# Patient Record
Sex: MALE | Race: WHITE | NOT HISPANIC OR LATINO | Employment: OTHER | URBAN - METROPOLITAN AREA
[De-identification: names, ages, dates, MRNs, and addresses within clinical notes are randomized per-mention and may not be internally consistent; named-entity substitution may affect disease eponyms.]

---

## 2019-06-18 ENCOUNTER — TRANSCRIBE ORDERS (OUTPATIENT)
Dept: ADMINISTRATIVE | Facility: HOSPITAL | Age: 39
End: 2019-06-18

## 2019-06-29 ENCOUNTER — HOSPITAL ENCOUNTER (EMERGENCY)
Facility: HOSPITAL | Age: 39
Discharge: HOME | End: 2019-06-29
Attending: EMERGENCY MEDICINE | Admitting: EMERGENCY MEDICINE
Payer: COMMERCIAL

## 2019-06-29 ENCOUNTER — APPOINTMENT (EMERGENCY)
Dept: RADIOLOGY | Facility: HOSPITAL | Age: 39
End: 2019-06-29
Payer: COMMERCIAL

## 2019-06-29 VITALS
WEIGHT: 292.33 LBS | OXYGEN SATURATION: 93 % | HEART RATE: 61 BPM | TEMPERATURE: 98.4 F | RESPIRATION RATE: 17 BRPM | SYSTOLIC BLOOD PRESSURE: 160 MMHG | DIASTOLIC BLOOD PRESSURE: 72 MMHG

## 2019-06-29 DIAGNOSIS — R10.13 EPIGASTRIC PAIN: ICD-10-CM

## 2019-06-29 DIAGNOSIS — R11.2 NAUSEA AND VOMITING: ICD-10-CM

## 2019-06-29 DIAGNOSIS — R07.9 CHEST PAIN: Primary | ICD-10-CM

## 2019-06-29 LAB
ALBUMIN SERPL BCP-MCNC: 3.2 G/DL (ref 3.5–5)
ALP SERPL-CCNC: 85 U/L (ref 46–116)
ALT SERPL W P-5'-P-CCNC: 17 U/L (ref 12–78)
ANION GAP SERPL CALCULATED.3IONS-SCNC: 10 MMOL/L (ref 4–13)
AST SERPL W P-5'-P-CCNC: 19 U/L (ref 5–45)
BASOPHILS # BLD MANUAL: 0 THOUSAND/UL (ref 0–0.1)
BASOPHILS NFR MAR MANUAL: 0 % (ref 0–1)
BILIRUB SERPL-MCNC: 0.2 MG/DL (ref 0.2–1)
BUN SERPL-MCNC: 6 MG/DL (ref 5–25)
CALCIUM SERPL-MCNC: 8.6 MG/DL (ref 8.3–10.1)
CHLORIDE SERPL-SCNC: 101 MMOL/L (ref 100–108)
CO2 SERPL-SCNC: 26 MMOL/L (ref 21–32)
CREAT SERPL-MCNC: 0.84 MG/DL (ref 0.6–1.3)
EOSINOPHIL # BLD MANUAL: 0 THOUSAND/UL (ref 0–0.4)
EOSINOPHIL NFR BLD MANUAL: 0 % (ref 0–6)
ERYTHROCYTE [DISTWIDTH] IN BLOOD BY AUTOMATED COUNT: 15.4 % (ref 11.6–15.1)
GFR SERPL CREATININE-BSD FRML MDRD: 109 ML/MIN/1.73SQ M
GLUCOSE SERPL-MCNC: 177 MG/DL (ref 65–140)
HCT VFR BLD AUTO: 40.7 % (ref 36.5–49.3)
HGB BLD-MCNC: 12.7 G/DL (ref 12–17)
LG PLATELETS BLD QL SMEAR: PRESENT
LIPASE SERPL-CCNC: 389 U/L (ref 73–393)
LYMPHOCYTES # BLD AUTO: 0.61 THOUSAND/UL (ref 0.6–4.47)
LYMPHOCYTES # BLD AUTO: 4 % (ref 14–44)
MCH RBC QN AUTO: 26.4 PG (ref 26.8–34.3)
MCHC RBC AUTO-ENTMCNC: 31.2 G/DL (ref 31.4–37.4)
MCV RBC AUTO: 85 FL (ref 82–98)
MONOCYTES # BLD AUTO: 0.31 THOUSAND/UL (ref 0–1.22)
MONOCYTES NFR BLD: 2 % (ref 4–12)
NEUTROPHILS # BLD MANUAL: 14.36 THOUSAND/UL (ref 1.85–7.62)
NEUTS BAND NFR BLD MANUAL: 1 % (ref 0–8)
NEUTS SEG NFR BLD AUTO: 93 % (ref 43–75)
NRBC BLD AUTO-RTO: 0 /100 WBCS
PLATELET # BLD AUTO: 233 THOUSANDS/UL (ref 149–390)
PLATELET BLD QL SMEAR: ADEQUATE
PMV BLD AUTO: 10.4 FL (ref 8.9–12.7)
POTASSIUM SERPL-SCNC: 3.7 MMOL/L (ref 3.5–5.3)
PROT SERPL-MCNC: 7.3 G/DL (ref 6.4–8.2)
RBC # BLD AUTO: 4.81 MILLION/UL (ref 3.88–5.62)
SODIUM SERPL-SCNC: 137 MMOL/L (ref 136–145)
TOTAL CELLS COUNTED SPEC: 100
TROPONIN I SERPL-MCNC: <0.02 NG/ML
WBC # BLD AUTO: 15.28 THOUSAND/UL (ref 4.31–10.16)

## 2019-06-29 PROCEDURE — 83690 ASSAY OF LIPASE: CPT | Performed by: EMERGENCY MEDICINE

## 2019-06-29 PROCEDURE — 96372 THER/PROPH/DIAG INJ SC/IM: CPT

## 2019-06-29 PROCEDURE — 99283 EMERGENCY DEPT VISIT LOW MDM: CPT | Performed by: EMERGENCY MEDICINE

## 2019-06-29 PROCEDURE — 80053 COMPREHEN METABOLIC PANEL: CPT | Performed by: EMERGENCY MEDICINE

## 2019-06-29 PROCEDURE — 96374 THER/PROPH/DIAG INJ IV PUSH: CPT

## 2019-06-29 PROCEDURE — 84484 ASSAY OF TROPONIN QUANT: CPT | Performed by: EMERGENCY MEDICINE

## 2019-06-29 PROCEDURE — 96376 TX/PRO/DX INJ SAME DRUG ADON: CPT

## 2019-06-29 PROCEDURE — 96361 HYDRATE IV INFUSION ADD-ON: CPT

## 2019-06-29 PROCEDURE — 85007 BL SMEAR W/DIFF WBC COUNT: CPT | Performed by: EMERGENCY MEDICINE

## 2019-06-29 PROCEDURE — 85027 COMPLETE CBC AUTOMATED: CPT | Performed by: EMERGENCY MEDICINE

## 2019-06-29 PROCEDURE — 36415 COLL VENOUS BLD VENIPUNCTURE: CPT

## 2019-06-29 PROCEDURE — 93005 ELECTROCARDIOGRAM TRACING: CPT

## 2019-06-29 PROCEDURE — 71046 X-RAY EXAM CHEST 2 VIEWS: CPT

## 2019-06-29 PROCEDURE — 99285 EMERGENCY DEPT VISIT HI MDM: CPT

## 2019-06-29 RX ORDER — METOCLOPRAMIDE HYDROCHLORIDE 5 MG/ML
10 INJECTION INTRAMUSCULAR; INTRAVENOUS ONCE
Status: COMPLETED | OUTPATIENT
Start: 2019-06-29 | End: 2019-06-29

## 2019-06-29 RX ORDER — LIDOCAINE HYDROCHLORIDE 20 MG/ML
15 SOLUTION OROPHARYNGEAL ONCE
Status: COMPLETED | OUTPATIENT
Start: 2019-06-29 | End: 2019-06-29

## 2019-06-29 RX ORDER — HALOPERIDOL 5 MG/ML
5 INJECTION INTRAMUSCULAR ONCE
Status: COMPLETED | OUTPATIENT
Start: 2019-06-29 | End: 2019-06-29

## 2019-06-29 RX ORDER — MAGNESIUM HYDROXIDE/ALUMINUM HYDROXICE/SIMETHICONE 120; 1200; 1200 MG/30ML; MG/30ML; MG/30ML
30 SUSPENSION ORAL ONCE
Status: COMPLETED | OUTPATIENT
Start: 2019-06-29 | End: 2019-06-29

## 2019-06-29 RX ORDER — METOCLOPRAMIDE 10 MG/1
10 TABLET ORAL EVERY 6 HOURS PRN
Qty: 30 TABLET | Refills: 0 | Status: SHIPPED | OUTPATIENT
Start: 2019-06-29 | End: 2019-09-27 | Stop reason: HOSPADM

## 2019-06-29 RX ADMIN — HALOPERIDOL LACTATE 5 MG: 5 INJECTION INTRAMUSCULAR at 03:09

## 2019-06-29 RX ADMIN — SODIUM CHLORIDE 1000 ML: 0.9 INJECTION, SOLUTION INTRAVENOUS at 01:27

## 2019-06-29 RX ADMIN — LIDOCAINE HYDROCHLORIDE 15 ML: 20 SOLUTION ORAL; TOPICAL at 01:31

## 2019-06-29 RX ADMIN — METOCLOPRAMIDE 10 MG: 5 INJECTION, SOLUTION INTRAMUSCULAR; INTRAVENOUS at 02:38

## 2019-06-29 RX ADMIN — ALUMINUM HYDROXIDE, MAGNESIUM HYDROXIDE, AND SIMETHICONE 30 ML: 200; 200; 20 SUSPENSION ORAL at 01:31

## 2019-06-29 RX ADMIN — METOCLOPRAMIDE 10 MG: 5 INJECTION, SOLUTION INTRAMUSCULAR; INTRAVENOUS at 01:27

## 2019-06-30 LAB
ATRIAL RATE: 57 BPM
P AXIS: 25 DEGREES
PR INTERVAL: 164 MS
QRS AXIS: 55 DEGREES
QRSD INTERVAL: 88 MS
QT INTERVAL: 474 MS
QTC INTERVAL: 461 MS
T WAVE AXIS: 38 DEGREES
VENTRICULAR RATE: 57 BPM

## 2019-06-30 PROCEDURE — 93010 ELECTROCARDIOGRAM REPORT: CPT | Performed by: INTERNAL MEDICINE

## 2019-07-03 NOTE — ED PROVIDER NOTES
History  Chief Complaint   Patient presents with    Chest Pain     pt vomiting with tight chest pain on the left      HPI  68-year-old male with history of diabetes and hypertension presents to the emergency department with complaints of vomiting and chest pain  Patient states that he has had abdominal issues for the past few months with epigastric pain as well as frequent nausea and vomiting, for which he has been taking Zantac and Prilosec  He has undergone outpatient workup including recent CT abdomen pelvis and is scheduled for an abdominal MRI and endoscopy in the upcoming weeks  He states that tonight he began again with epigastric pain, nausea, and vomiting  After vomiting multiple times, patient began to have central and left-sided chest tightness  He denies having had similar pain in the past and admits that he was retching very hard before pain began  He states that abdominal pain, nausea, and vomiting are unchanged from what he experiences frequently and he would not like to undergo further workup with CTAP  On ROS he denies recent fevers, chills, shortness of breath, lower abdominal pain, change in urinary/bowel function, or complaints other than stated above  Patient denies marijuana use  Patient continued to vomit after receiving Reglan, has allergy to Zofran  Given IM Haldol, after which nausea, vomiting, and dry heaves resolved  Patient reassessed and states he feels much better, requests prescription for p o  Reglan to take at home  He will follow up outpatient, return to the emergency department for new or worsening symptoms  None       Past Medical History:   Diagnosis Date    Cardiac disease     Hypertension        Past Surgical History:   Procedure Laterality Date    BACK SURGERY      HERNIA REPAIR      KNEE SURGERY       History reviewed  No pertinent family history  I have reviewed and agree with the history as documented      Social History     Tobacco Use    Smoking status: Current Every Day Smoker     Packs/day: 0 50   Substance Use Topics    Alcohol use: Not Currently    Drug use: Never        Review of Systems   Constitutional: Negative for chills and fever  Respiratory: Negative for shortness of breath  Cardiovascular: Positive for chest pain  Gastrointestinal: Positive for abdominal pain, nausea and vomiting  Musculoskeletal: Negative for back pain  Skin: Negative for rash and wound  Allergic/Immunologic: Negative for immunocompromised state  Neurological: Negative for headaches  Psychiatric/Behavioral: The patient is not nervous/anxious  All other systems reviewed and are negative  Physical Exam  Physical Exam   Constitutional: He is oriented to person, place, and time  He appears well-nourished  No distress  HENT:   Head: Normocephalic and atraumatic  Eyes: EOM are normal    Neck: Normal range of motion  Neck supple  Cardiovascular: Normal rate and regular rhythm  Pulmonary/Chest: Effort normal and breath sounds normal  No respiratory distress  Abdominal: Soft  He exhibits no distension  There is tenderness in the epigastric area  Musculoskeletal: Normal range of motion  Neurological: He is alert and oriented to person, place, and time  Skin: Skin is warm and dry  He is not diaphoretic  Psychiatric: He has a normal mood and affect  His behavior is normal    Nursing note and vitals reviewed        Vital Signs  ED Triage Vitals   Temperature Pulse Respirations Blood Pressure SpO2   06/29/19 0019 06/29/19 0018 06/29/19 0018 06/29/19 0018 06/29/19 0018   98 4 °F (36 9 °C) 61 18 (!) 179/89 98 %      Temp Source Heart Rate Source Patient Position - Orthostatic VS BP Location FiO2 (%)   06/29/19 0019 -- -- -- --   Oral          Pain Score       --                  Vitals:    06/29/19 0100 06/29/19 0130 06/29/19 0230 06/29/19 0400   BP: 151/86 147/67 141/74 160/72   Pulse: 58 55 (!) 54 61         Visual Acuity      ED Medications  Medications   Lidocaine Viscous HCl (XYLOCAINE) 2 % mucosal solution 15 mL (15 mL Swish & Swallow Given 6/29/19 0131)   aluminum-magnesium hydroxide-simethicone (MYLANTA) 200-200-20 mg/5 mL oral suspension 30 mL (30 mL Oral Given 6/29/19 0131)   metoclopramide (REGLAN) injection 10 mg (10 mg Intravenous Given 6/29/19 0127)   sodium chloride 0 9 % bolus 1,000 mL (0 mL Intravenous Stopped 6/29/19 0226)   metoclopramide (REGLAN) injection 10 mg (10 mg Intravenous Given 6/29/19 0238)   haloperidol lactate (HALDOL) injection 5 mg (5 mg Intramuscular Given 6/29/19 0309)       Diagnostic Studies  Results Reviewed     Procedure Component Value Units Date/Time    Lipase [[id number]]  (Normal) Collected:  06/29/19 0040    Lab Status:  Final result Specimen:  Blood from Arm, Right Updated:  06/29/19 0154     Lipase 389 u/L     CBC and differential [[id number]]  (Abnormal) Collected:  06/29/19 0040    Lab Status:  Final result Specimen:  Blood from Arm, Right Updated:  06/29/19 0138     WBC 15 28 Thousand/uL      RBC 4 81 Million/uL      Hemoglobin 12 7 g/dL      Hematocrit 40 7 %      MCV 85 fL      MCH 26 4 pg      MCHC 31 2 g/dL      RDW 15 4 %      MPV 10 4 fL      Platelets 248 Thousands/uL      nRBC 0 /100 WBCs     Troponin I [[id number]]  (Normal) Collected:  06/29/19 0040    Lab Status:  Final result Specimen:  Blood from Arm, Right Updated:  06/29/19 0108     Troponin I <0 02 ng/mL     Comprehensive metabolic panel [[id number]]  (Abnormal) Collected:  06/29/19 0040    Lab Status:  Final result Specimen:  Blood from Arm, Right Updated:  06/29/19 0103     Sodium 137 mmol/L      Potassium 3 7 mmol/L      Chloride 101 mmol/L      CO2 26 mmol/L      ANION GAP 10 mmol/L      BUN 6 mg/dL      Creatinine 0 84 mg/dL      Glucose 177 mg/dL      Calcium 8 6 mg/dL      AST 19 U/L      ALT 17 U/L      Alkaline Phosphatase 85 U/L      Total Protein 7 3 g/dL      Albumin 3 2 g/dL      Total Bilirubin 0 20 mg/dL      eGFR 109 ml/min/1 73sq m     Narrative:       National Kidney Disease Foundation guidelines for Chronic Kidney Disease (CKD):     Stage 1 with normal or high GFR (GFR > 90 mL/min/1 73 square meters)    Stage 2 Mild CKD (GFR = 60-89 mL/min/1 73 square meters)    Stage 3A Moderate CKD (GFR = 45-59 mL/min/1 73 square meters)    Stage 3B Moderate CKD (GFR = 30-44 mL/min/1 73 square meters)    Stage 4 Severe CKD (GFR = 15-29 mL/min/1 73 square meters)    Stage 5 End Stage CKD (GFR <15 mL/min/1 73 square meters)  Note: GFR calculation is accurate only with a steady state creatinine                 XR chest 2 views   Final Result by Mk Cameron MD (06/29 1011)      No acute cardiopulmonary disease  Workstation performed: [id number]                    Procedures  Procedures  EKG: Sinus keny @ 57 BPM, sinus arrhythmia, normal axis, no acute ST/T wave abnormality, no prior for comparison    ED Course           MDM    Disposition  Final diagnoses:   Chest pain   Epigastric pain   Nausea and vomiting     Time reflects when diagnosis was documented in both MDM as applicable and the Disposition within this note     Time User Action Codes Description Comment    6/29/2019  4:17 AM Theresa Noriega Add [R07 9] Chest pain     6/29/2019  4:17 AM Theresa Flowers [R10 13] Epigastric pain     6/29/2019  4:17 AM Theresa Flowers [R11 2] Nausea and vomiting       ED Disposition     ED Disposition Condition Date/Time Comment    Discharge Stable Sat Jun 29, 2019  4:17 AM Jacob Augustine discharge to home/self care              Follow-up Information     Follow up With Specialties Details Why Contact Info Additional [address], DO Family Medicine  As needed [address] [facility] [address] [place] [address] 65 Emergency Department Emergency Medicine  If symptoms worsen 34 [facility] [address] [phone] [place] ED, [address], [place], [address]          Discharge Medication List as of 6/29/2019  4:18 AM      START taking these medications    Details   metoclopramide (REGLAN) 10 mg tablet Take 1 tablet (10 mg total) by mouth every 6 (six) hours as needed (nausea/vomiting), Starting Sat 6/29/2019, Print           No discharge procedures on file      ED Provider  Electronically Signed by           Isauro Mosquera MD  07/03/19 6137

## 2019-09-04 ENCOUNTER — APPOINTMENT (EMERGENCY)
Dept: CT IMAGING | Facility: HOSPITAL | Age: 39
DRG: 663 | End: 2019-09-04
Payer: COMMERCIAL

## 2019-09-04 ENCOUNTER — HOSPITAL ENCOUNTER (INPATIENT)
Facility: HOSPITAL | Age: 39
LOS: 4 days | Discharge: HOME | DRG: 663 | End: 2019-09-08
Attending: EMERGENCY MEDICINE | Admitting: INTERNAL MEDICINE
Payer: COMMERCIAL

## 2019-09-04 DIAGNOSIS — R11.10 INTRACTABLE VOMITING: Primary | ICD-10-CM

## 2019-09-04 DIAGNOSIS — R10.13 EPIGASTRIC PAIN: ICD-10-CM

## 2019-09-04 DIAGNOSIS — K85.90 ACUTE PANCREATITIS: ICD-10-CM

## 2019-09-04 DIAGNOSIS — E66.01 MORBID OBESITY WITH BMI OF 40.0-44.9, ADULT (HCC): ICD-10-CM

## 2019-09-04 DIAGNOSIS — K86.89 PANCREATIC MASS: ICD-10-CM

## 2019-09-04 DIAGNOSIS — K29.70 GASTRITIS: ICD-10-CM

## 2019-09-04 PROBLEM — F11.20 OPIATE DEPENDENCE, CONTINUOUS (HCC): Status: ACTIVE | Noted: 2019-09-04

## 2019-09-04 PROBLEM — K76.0 HEPATIC STEATOSIS: Status: ACTIVE | Noted: 2019-09-04

## 2019-09-04 PROBLEM — Z72.0 TOBACCO USE: Status: ACTIVE | Noted: 2019-09-04

## 2019-09-04 PROBLEM — R16.0 HEPATOMEGALY: Status: ACTIVE | Noted: 2019-09-04

## 2019-09-04 PROBLEM — Q89.09 SPLEEN ANOMALY: Status: ACTIVE | Noted: 2019-09-04

## 2019-09-04 LAB
ALBUMIN SERPL BCP-MCNC: 3.1 G/DL (ref 3.5–5)
ALP SERPL-CCNC: 79 U/L (ref 46–116)
ALT SERPL W P-5'-P-CCNC: 22 U/L (ref 12–78)
ANION GAP SERPL CALCULATED.3IONS-SCNC: 12 MMOL/L (ref 4–13)
APTT PPP: 29 SECONDS (ref 23–37)
AST SERPL W P-5'-P-CCNC: 23 U/L (ref 5–45)
ATRIAL RATE: 61 BPM
BASOPHILS # BLD AUTO: 0.01 THOUSANDS/ΜL (ref 0–0.1)
BASOPHILS NFR BLD AUTO: 0 % (ref 0–1)
BILIRUB SERPL-MCNC: 0.5 MG/DL (ref 0.2–1)
BUN SERPL-MCNC: 9 MG/DL (ref 5–25)
CALCIUM SERPL-MCNC: 8.9 MG/DL (ref 8.3–10.1)
CHLORIDE SERPL-SCNC: 101 MMOL/L (ref 100–108)
CO2 SERPL-SCNC: 27 MMOL/L (ref 21–32)
CREAT SERPL-MCNC: 0.64 MG/DL (ref 0.6–1.3)
EOSINOPHIL # BLD AUTO: 0.04 THOUSAND/ΜL (ref 0–0.61)
EOSINOPHIL NFR BLD AUTO: 0 % (ref 0–6)
ERYTHROCYTE [DISTWIDTH] IN BLOOD BY AUTOMATED COUNT: 15.6 % (ref 11.6–15.1)
GFR SERPL CREATININE-BSD FRML MDRD: 122 ML/MIN/1.73SQ M
GLUCOSE SERPL-MCNC: 119 MG/DL (ref 65–140)
HCT VFR BLD AUTO: 42.6 % (ref 36.5–49.3)
HGB BLD-MCNC: 13.2 G/DL (ref 12–17)
IMM GRANULOCYTES # BLD AUTO: 0.04 THOUSAND/UL (ref 0–0.2)
IMM GRANULOCYTES NFR BLD AUTO: 0 % (ref 0–2)
INR PPP: 1 (ref 0.84–1.19)
LACTATE SERPL-SCNC: 0.9 MMOL/L (ref 0.5–2)
LIPASE SERPL-CCNC: 412 U/L (ref 73–393)
LYMPHOCYTES # BLD AUTO: 1.12 THOUSANDS/ΜL (ref 0.6–4.47)
LYMPHOCYTES NFR BLD AUTO: 11 % (ref 14–44)
MCH RBC QN AUTO: 26.9 PG (ref 26.8–34.3)
MCHC RBC AUTO-ENTMCNC: 31 G/DL (ref 31.4–37.4)
MCV RBC AUTO: 87 FL (ref 82–98)
MONOCYTES # BLD AUTO: 0.33 THOUSAND/ΜL (ref 0.17–1.22)
MONOCYTES NFR BLD AUTO: 3 % (ref 4–12)
NEUTROPHILS # BLD AUTO: 8.5 THOUSANDS/ΜL (ref 1.85–7.62)
NEUTS SEG NFR BLD AUTO: 86 % (ref 43–75)
NRBC BLD AUTO-RTO: 0 /100 WBCS
P AXIS: -6 DEGREES
PLATELET # BLD AUTO: 215 THOUSANDS/UL (ref 149–390)
PMV BLD AUTO: 10.8 FL (ref 8.9–12.7)
POTASSIUM SERPL-SCNC: 4.2 MMOL/L (ref 3.5–5.3)
PR INTERVAL: 186 MS
PROT SERPL-MCNC: 7.5 G/DL (ref 6.4–8.2)
PROTHROMBIN TIME: 13.2 SECONDS (ref 11.6–14.5)
QRS AXIS: 47 DEGREES
QRSD INTERVAL: 90 MS
QT INTERVAL: 484 MS
QTC INTERVAL: 487 MS
RBC # BLD AUTO: 4.91 MILLION/UL (ref 3.88–5.62)
SODIUM SERPL-SCNC: 140 MMOL/L (ref 136–145)
T WAVE AXIS: 28 DEGREES
TROPONIN I SERPL-MCNC: <0.02 NG/ML
VENTRICULAR RATE: 61 BPM
WBC # BLD AUTO: 10.04 THOUSAND/UL (ref 4.31–10.16)

## 2019-09-04 PROCEDURE — 93005 ELECTROCARDIOGRAM TRACING: CPT

## 2019-09-04 PROCEDURE — 83690 ASSAY OF LIPASE: CPT | Performed by: PHYSICIAN ASSISTANT

## 2019-09-04 PROCEDURE — 96372 THER/PROPH/DIAG INJ SC/IM: CPT

## 2019-09-04 PROCEDURE — 99254 IP/OBS CNSLTJ NEW/EST MOD 60: CPT | Performed by: INTERNAL MEDICINE

## 2019-09-04 PROCEDURE — 84484 ASSAY OF TROPONIN QUANT: CPT | Performed by: PHYSICIAN ASSISTANT

## 2019-09-04 PROCEDURE — 99223 1ST HOSP IP/OBS HIGH 75: CPT | Performed by: INTERNAL MEDICINE

## 2019-09-04 PROCEDURE — 99285 EMERGENCY DEPT VISIT HI MDM: CPT

## 2019-09-04 PROCEDURE — 93010 ELECTROCARDIOGRAM REPORT: CPT | Performed by: INTERNAL MEDICINE

## 2019-09-04 PROCEDURE — 74176 CT ABD & PELVIS W/O CONTRAST: CPT

## 2019-09-04 PROCEDURE — 85025 COMPLETE CBC W/AUTO DIFF WBC: CPT | Performed by: PHYSICIAN ASSISTANT

## 2019-09-04 PROCEDURE — 99285 EMERGENCY DEPT VISIT HI MDM: CPT | Performed by: PHYSICIAN ASSISTANT

## 2019-09-04 PROCEDURE — 80053 COMPREHEN METABOLIC PANEL: CPT | Performed by: PHYSICIAN ASSISTANT

## 2019-09-04 PROCEDURE — 83605 ASSAY OF LACTIC ACID: CPT | Performed by: PHYSICIAN ASSISTANT

## 2019-09-04 PROCEDURE — 36415 COLL VENOUS BLD VENIPUNCTURE: CPT | Performed by: PHYSICIAN ASSISTANT

## 2019-09-04 PROCEDURE — 85610 PROTHROMBIN TIME: CPT | Performed by: PHYSICIAN ASSISTANT

## 2019-09-04 PROCEDURE — 85730 THROMBOPLASTIN TIME PARTIAL: CPT | Performed by: PHYSICIAN ASSISTANT

## 2019-09-04 PROCEDURE — C9113 INJ PANTOPRAZOLE SODIUM, VIA: HCPCS | Performed by: INTERNAL MEDICINE

## 2019-09-04 RX ORDER — HYDROMORPHONE HCL/PF 1 MG/ML
0.5 SYRINGE (ML) INJECTION
Status: DISCONTINUED | OUTPATIENT
Start: 2019-09-04 | End: 2019-09-05

## 2019-09-04 RX ORDER — ACETAMINOPHEN 325 MG/1
650 TABLET ORAL EVERY 6 HOURS PRN
Status: DISCONTINUED | OUTPATIENT
Start: 2019-09-04 | End: 2019-09-05

## 2019-09-04 RX ORDER — PREGABALIN 75 MG/1
100 CAPSULE ORAL 2 TIMES DAILY
COMMUNITY
End: 2019-10-08 | Stop reason: CLARIF

## 2019-09-04 RX ORDER — HALOPERIDOL 5 MG/ML
5 INJECTION INTRAMUSCULAR ONCE
Status: COMPLETED | OUTPATIENT
Start: 2019-09-04 | End: 2019-09-04

## 2019-09-04 RX ORDER — PANTOPRAZOLE SODIUM 40 MG/1
40 INJECTION, POWDER, FOR SOLUTION INTRAVENOUS
Status: DISCONTINUED | OUTPATIENT
Start: 2019-09-04 | End: 2019-09-05

## 2019-09-04 RX ORDER — METHADONE HYDROCHLORIDE 10 MG/ML
100 CONCENTRATE ORAL
COMMUNITY
End: 2019-11-11 | Stop reason: SDUPTHER

## 2019-09-04 RX ORDER — OMEPRAZOLE 20 MG/1
20 CAPSULE, DELAYED RELEASE ORAL DAILY
COMMUNITY
End: 2019-10-08 | Stop reason: CLARIF

## 2019-09-04 RX ORDER — PROMETHAZINE HYDROCHLORIDE 25 MG/ML
25 INJECTION, SOLUTION INTRAMUSCULAR; INTRAVENOUS EVERY 4 HOURS PRN
Status: DISCONTINUED | OUTPATIENT
Start: 2019-09-04 | End: 2019-09-06

## 2019-09-04 RX ORDER — ATENOLOL 25 MG/1
25 TABLET ORAL DAILY
Status: DISCONTINUED | OUTPATIENT
Start: 2019-09-04 | End: 2019-09-06

## 2019-09-04 RX ORDER — ATENOLOL 50 MG/1
100 TABLET ORAL DAILY
COMMUNITY
End: 2019-11-11 | Stop reason: SDUPTHER

## 2019-09-04 RX ORDER — DIPHENHYDRAMINE HYDROCHLORIDE 50 MG/ML
25 INJECTION INTRAMUSCULAR; INTRAVENOUS ONCE
Status: COMPLETED | OUTPATIENT
Start: 2019-09-04 | End: 2019-09-04

## 2019-09-04 RX ORDER — LISINOPRIL 20 MG/1
20 TABLET ORAL DAILY
COMMUNITY
End: 2019-09-27 | Stop reason: HOSPADM

## 2019-09-04 RX ORDER — METOCLOPRAMIDE HYDROCHLORIDE 5 MG/ML
10 INJECTION INTRAMUSCULAR; INTRAVENOUS ONCE
Status: DISCONTINUED | OUTPATIENT
Start: 2019-09-04 | End: 2019-09-04

## 2019-09-04 RX ORDER — SODIUM CHLORIDE 9 MG/ML
100 INJECTION, SOLUTION INTRAVENOUS CONTINUOUS
Status: DISCONTINUED | OUTPATIENT
Start: 2019-09-04 | End: 2019-09-08

## 2019-09-04 RX ORDER — DIPHENHYDRAMINE HYDROCHLORIDE 50 MG/ML
25 INJECTION INTRAMUSCULAR; INTRAVENOUS ONCE
Status: DISCONTINUED | OUTPATIENT
Start: 2019-09-04 | End: 2019-09-04

## 2019-09-04 RX ORDER — LISINOPRIL 20 MG/1
20 TABLET ORAL DAILY
Status: DISCONTINUED | OUTPATIENT
Start: 2019-09-04 | End: 2019-09-08 | Stop reason: HOSPADM

## 2019-09-04 RX ORDER — HYDROMORPHONE HCL/PF 1 MG/ML
1 SYRINGE (ML) INJECTION
Status: DISCONTINUED | OUTPATIENT
Start: 2019-09-04 | End: 2019-09-05

## 2019-09-04 RX ORDER — PREGABALIN 75 MG/1
75 CAPSULE ORAL 2 TIMES DAILY
Status: DISCONTINUED | OUTPATIENT
Start: 2019-09-04 | End: 2019-09-08 | Stop reason: HOSPADM

## 2019-09-04 RX ORDER — METOCLOPRAMIDE HYDROCHLORIDE 5 MG/ML
10 INJECTION INTRAMUSCULAR; INTRAVENOUS ONCE
Status: COMPLETED | OUTPATIENT
Start: 2019-09-04 | End: 2019-09-04

## 2019-09-04 RX ORDER — SODIUM CHLORIDE 9 MG/ML
125 INJECTION, SOLUTION INTRAVENOUS CONTINUOUS
Status: DISCONTINUED | OUTPATIENT
Start: 2019-09-04 | End: 2019-09-04

## 2019-09-04 RX ADMIN — METOCLOPRAMIDE 10 MG: 5 INJECTION, SOLUTION INTRAMUSCULAR; INTRAVENOUS at 10:09

## 2019-09-04 RX ADMIN — PROMETHAZINE HYDROCHLORIDE 25 MG: 25 INJECTION INTRAMUSCULAR; INTRAVENOUS at 21:54

## 2019-09-04 RX ADMIN — PREGABALIN 75 MG: 75 CAPSULE ORAL at 17:37

## 2019-09-04 RX ADMIN — DIPHENHYDRAMINE HYDROCHLORIDE 25 MG: 50 INJECTION, SOLUTION INTRAMUSCULAR; INTRAVENOUS at 10:12

## 2019-09-04 RX ADMIN — SODIUM CHLORIDE 125 ML/HR: 0.9 INJECTION, SOLUTION INTRAVENOUS at 23:39

## 2019-09-04 RX ADMIN — METOCLOPRAMIDE 10 MG: 5 INJECTION, SOLUTION INTRAMUSCULAR; INTRAVENOUS at 08:49

## 2019-09-04 RX ADMIN — PANTOPRAZOLE SODIUM 40 MG: 40 INJECTION, POWDER, FOR SOLUTION INTRAVENOUS at 15:34

## 2019-09-04 RX ADMIN — HYDROMORPHONE HYDROCHLORIDE 1 MG: 1 INJECTION, SOLUTION INTRAMUSCULAR; INTRAVENOUS; SUBCUTANEOUS at 21:53

## 2019-09-04 RX ADMIN — HALOPERIDOL LACTATE 5 MG: 5 INJECTION INTRAMUSCULAR at 08:45

## 2019-09-04 RX ADMIN — SODIUM CHLORIDE 125 ML/HR: 0.9 INJECTION, SOLUTION INTRAVENOUS at 15:26

## 2019-09-04 RX ADMIN — SODIUM CHLORIDE 1000 ML: 0.9 INJECTION, SOLUTION INTRAVENOUS at 12:14

## 2019-09-04 RX ADMIN — PROMETHAZINE HYDROCHLORIDE 25 MG: 25 INJECTION INTRAMUSCULAR; INTRAVENOUS at 15:36

## 2019-09-04 NOTE — ASSESSMENT & PLAN NOTE
· Lifestyle modifications are recommended including weight loss, improving his diet, and increasing his amount of activity    · Check an outpatient sleep study to evaluate the patient for obstructive sleep apnea

## 2019-09-04 NOTE — ASSESSMENT & PLAN NOTE
· Possible pancreatic mass  · Check an MRCP/MRI of the pancreas  · Check a CA 19-9 level  · Consult Gastroenterology

## 2019-09-04 NOTE — ASSESSMENT & PLAN NOTE
· On chronic methadone treatment for chronic neck/back pain  · Hold methadone during the hospitalization while utilizing PRN IV dilaudid

## 2019-09-04 NOTE — CONSULTS
Consultation - [facility]  Rosalinddevon Villeda 37 y o  male MRN: [id number]  Unit/Bed#: -01 Encounter: [id number]        Inpatient consult to gastroenterology  Consult performed by: Sangeetha Vargas PA-C  Consult ordered by: Sheryle Marek, DO          Reason for Consult / Principal Problem: Vomiting, Pain    HPI: Mr Vi Morales is a 38 yo M with a PMH of GERD, chronic methadone use, HTN, CAD, who presented due to persistent vomiting with epigastric pain  He reports he has been dealing with bilious vomiting for the past 6-7 months and his epigastric pain occurs after having significant vomiting  The abdominal pain does not precede the vomiting  He has never had pancreatitis before  He denies alcohol abuse  He recently had a workup done with [facility] with an EGD on 7/9 which showed a medium amount of food residue in the gastric body, localized severe mucosal congestion in the antrum, diffuse mild gastritis, and mucosal changes in the duodenum  Biopsies were taken but the results are not available  He also had a US which showed hepatic steatosis, mildly dilated CBD at 7 mm without obvious cholelithiasis or choledocholithiasis  He denies marijuana use  REVIEW OF SYSTEMS: Negative except for as stated above      Historical Information   Past Medical History:   Diagnosis Date    Cardiac disease     Hypertension      Past Surgical History:   Procedure Laterality Date    BACK SURGERY      HERNIA REPAIR      KNEE SURGERY       Social History   Social History     Substance and Sexual Activity   Alcohol Use Not Currently     Social History     Substance and Sexual Activity   Drug Use Never     Social History     Tobacco Use   Smoking Status Current Every Day Smoker    Packs/day: 0 50   Smokeless Tobacco Never Used     History reviewed  No pertinent family history      Meds/Allergies     Medications Prior to Admission   Medication    atenolol (TENORMIN) 50 mg tablet    lisinopril (ZESTRIL) 20 mg tablet    methadone (DOLOPHINE) 10 mg/mL oral concentrated solution    omeprazole (PriLOSEC) 20 mg delayed release capsule    pregabalin (LYRICA) 75 mg capsule    metoclopramide (REGLAN) 10 mg tablet     Current Facility-Administered Medications   Medication Dose Route Frequency    acetaminophen (TYLENOL) tablet 650 mg  650 mg Oral Q6H PRN    enoxaparin (LOVENOX) subcutaneous injection 40 mg  40 mg Subcutaneous Q12H [facility]    HYDROmorphone (DILAUDID) injection 0 5 mg  0 5 mg Intravenous Q3H PRN    Or    HYDROmorphone (DILAUDID) injection 1 mg  1 mg Intravenous Q3H PRN    nicotine (NICODERM CQ) 7 mg/24hr TD 24 hr patch 1 patch  1 patch Transdermal Daily    pantoprazole (PROTONIX) injection 40 mg  40 mg Intravenous Q24H [facility]    promethazine (PHENERGAN) injection 25 mg  25 mg Intravenous Q4H PRN    sodium chloride 0 9 % infusion  125 mL/hr Intravenous Continuous    sodium chloride 0 9 % infusion  125 mL/hr Intravenous Continuous       Allergies   Allergen Reactions    Iodinated Diagnostic Agents     Ketorolac Tromethamine      Other reaction(s): Other (Please comment)  Pt goes into shock if given    Ondansetron      Other reaction(s): Neuro complications (Please comment)  "Hands curl up  Face goes numb " per patient   A friend observed that he gets "violently ill "    3 May 2013: Zofran was given intraoperatively   No adverse, or even minor reaction has been observed after about 3 hours   Penicillins Hives     Other reaction(s): Other (Please comment)  Trouble breathing    Shellfish Allergy     Varenicline Hives           Objective     Blood pressure 144/78, pulse 67, temperature 98 4 °F (36 9 °C), temperature source Oral, resp  rate 17, height 5' 10" (1 778 m), weight 128 kg (282 lb 9 6 oz), SpO2 96 %      No intake or output data in the 24 hours ending 09/04/19 1455      PHYSICAL EXAM:      General Appearance:   Alert, cooperative, no distress, appears stated age    HEENT:   Normocephalic, atraumatic, anicteric      Neck:  Supple, symmetrical, trachea midline   Lungs:   Clear to auscultation bilaterally; no rales, rhonchi or wheezing; respirations unlabored    Heart[de-identified]   RRR, no murmur   Abdomen:   Soft, non-tender, non-distended; normal bowel sounds; no masses, no organomegaly    Rectal:   Deferred    Extremities:  No cyanosis, clubbing or edema    Pulses:  2+ and symmetric all extremities    Skin:  No jaundice or pallor, no rashes or lesions      Lab Results:   Results from last 7 days   Lab Units 09/04/19  0857   WBC Thousand/uL 10 04   HEMOGLOBIN g/dL 13 2   HEMATOCRIT % 42 6   PLATELETS Thousands/uL 215   NEUTROS PCT % 86*   LYMPHS PCT % 11*   MONOS PCT % 3*   EOS PCT % 0     Results from last 7 days   Lab Units 09/04/19  0857   POTASSIUM mmol/L 4 2   CHLORIDE mmol/L 101   CO2 mmol/L 27   BUN mg/dL 9   CREATININE mg/dL 0 64   CALCIUM mg/dL 8 9   ALK PHOS U/L 79   ALT U/L 22   AST U/L 23     Results from last 7 days   Lab Units 09/04/19  0857   INR  1 00     Results from last 7 days   Lab Units 09/04/19  0857   LIPASE u/L 412*       Imaging Studies: I have personally reviewed pertinent imaging studies  Ct Abdomen Pelvis Wo Contrast  Result Date: 9/4/2019  Impression: Findings consistent with acute pancreatitis  There is questionable solid mass at junction of pancreatic body and tail and more accurate characterization with triple phase pancreatic protocol CT is strongly recommended to evaluate for possible pancreatic  adenocarcinoma  Thickening of the wall of the gastric antrum which could represent recurrent gastritis    Irregular soft tissue associated with the pancreas is inseparable from the serosal wall of the greater curvature of the stomach at 2 separate sites, gastric wall thickening also could represent reactive inflammatory wall thickening related to pancreatitis; alternatively, local invasion of the serosal wall of the stomach related to pancreatic tumor can also not be excluded on the basis of this noncontrast exam  Hepatomegaly and hepatic steatosis  Prominent vessels in the upper abdomen could indicate early changes of portal hypertension though the possibility that there is splenic vein thrombosis related to pancreatic mass must also be considered         ASSESSMENT and PLAN:      Vomiting  Elevated Lipase  - He does not meet criteria for pancreatitis as his lipase is 412 (needs to be 3x ULN), he does not have typical pancreatitis pain, and the noncontrasted CT has vague and questionable findings  - Suspect he may have gastroparesis related to chronic methadone use as his predominant symptom is vomiting over the past 6-7 months with epigastric soreness following the vomiting, his EGD in July with [facility] showed gastritis and moderate amount of retained food suggesting gastroparesis  - US with [facility] was negative for cholelithiasis, CBD noted to be mildly dilated at 7mm  - Clear liquid diet  - Supportive care  - Antiemetics PRN  - Will need to do formal gastric emptying study which ideally should be done off of opioids but he is on chronic methadone    Abnormal CT  Hepatomegaly  Hepatic Steatosis  - CT A/P without contrast on admission showed hepatomegaly, hepatic steatosis with prominent surrounding vessels concerning for early portal HTN, peripancreatic edema, focal masslike density in the pancreatic body/tail measuring 5 9 x 2 9 cm, marked thickening of the gastric antrum consistent with gastritis  - As above, low suspicion for pancreatitis as his symptoms are not consistent with this  - Will plan for MRI/MRCP to further evaluation of the pancreatic masslike density and to evaluate the CBD as this was noted to be dilated at 7 mm on an US at [facility] in June for the same symptoms  - Check CA 19-9    Chronic GERD  Gastritis  - EGD in July with [facility] showed medium amount of food residue in the stomach, congested mucosa in antrum, diffuse gastritis, mucosal changes in duodenum  - Biopsy results unavailable  - Continue PPI  - CT findings of gastritis are likely in the setting of persistent vomiting which has been ongoing for months      The patient was seen and examined by Dr Gonzalez Diane

## 2019-09-04 NOTE — ASSESSMENT & PLAN NOTE
· Elevated Lipase, not full pancreatitis  · Clear liquid diet for now  · NSS IV fluids  · Pain control  · PRN IV phenergan  · Possible pancreatic mass  · Noted MRCP/MRI results, see below  · Check a CA 19-9 level  · The patient denies any alcohol use  · Check a fasting lipid profile to evaluate the patient for hypertriglyceridemia  · Appreciate Gastroenterology  · GES on 9/6

## 2019-09-04 NOTE — ED PROVIDER NOTES
History  Chief Complaint   Patient presents with    Vomiting     N/V x 2 days  pt states "I dont feel good"     22-year-old male with past medical history significant for hypertension and coronary artery disease presents to the emergency department with chief complaint of nausea and vomiting  Onset of symptoms reported as 2 days ago  Location of symptoms reported as the epigastric area the abdomen  Quality is reported as multiple episodes of nausea and vomiting  Patient reports over 10 episodes of vomiting in the last 12 hours  Severity is reported as severe  Associated symptoms:  Positive for epigastric pain  Positive for nausea  Positive for vomiting  Denies diarrhea  Patient reports he believes he had a fever yesterday but none today  Denies chest pain or shortness of breath  Denies dizziness or syncope  Modifying factors:  Patient reports consumption of food exacerbates vomiting  Even during that water exacerbates vomiting  Patient reports he has had prior similar episodes in the past   Denies any recent spoiled food ingestions or sick contacts  Patient reports that he has been seen in the emergency department for similar symptoms in the past   He reports he was given medication as a shot in the muscle that really helped him  He states that they told him it was the "good stuff" but he is unsure what it was  He reports he has not taken his methadone in the last 2 days due to his symptoms  Denies any recent travel outside the country  Reviewed past visits via [facility]:  Patient was last seen in the emergency department on June 29, 2019 for evaluation of epigastric pain and chest pain  He received intramuscular injections of Haldol and metoclopramide for his symptoms        History provided by:  Patient   used: No    Vomiting   Associated symptoms: abdominal pain    Associated symptoms: no arthralgias, no chills, no cough, no diarrhea, no fever, no headaches, no myalgias and no sore throat        Prior to Admission Medications   Prescriptions Last Dose Informant Patient Reported? Taking?   metoclopramide (REGLAN) 10 mg tablet   No No   Sig: Take 1 tablet (10 mg total) by mouth every 6 (six) hours as needed (nausea/vomiting)      Facility-Administered Medications: None       Past Medical History:   Diagnosis Date    Cardiac disease     Hypertension        Past Surgical History:   Procedure Laterality Date    BACK SURGERY      HERNIA REPAIR      KNEE SURGERY         History reviewed  No pertinent family history  I have reviewed and agree with the history as documented  Social History     Tobacco Use    Smoking status: Current Every Day Smoker     Packs/day: 0 50    Smokeless tobacco: Never Used   Substance Use Topics    Alcohol use: Not Currently    Drug use: Never        Review of Systems   Constitutional: Negative for activity change, appetite change, chills, diaphoresis, fatigue, fever and unexpected weight change  HENT: Negative for congestion, dental problem, drooling, ear discharge, ear pain, facial swelling, hearing loss, mouth sores, nosebleeds, postnasal drip, rhinorrhea, sinus pressure, sinus pain, sneezing, sore throat, tinnitus, trouble swallowing and voice change  Eyes: Negative for photophobia, pain, discharge, redness, itching and visual disturbance  Respiratory: Negative for apnea, cough, choking, chest tightness, shortness of breath, wheezing and stridor  Cardiovascular: Negative for chest pain, palpitations and leg swelling  Gastrointestinal: Positive for abdominal pain, nausea and vomiting  Negative for abdominal distention, anal bleeding, blood in stool, constipation, diarrhea and rectal pain  Endocrine: Negative for cold intolerance, heat intolerance, polydipsia, polyphagia and polyuria  Genitourinary: Negative for decreased urine volume, difficulty urinating, dysuria, flank pain, frequency, hematuria and urgency     Musculoskeletal: Negative for arthralgias, back pain, gait problem, joint swelling, myalgias, neck pain and neck stiffness  Skin: Negative for color change, pallor, rash and wound  Allergic/Immunologic: Negative for environmental allergies, food allergies and immunocompromised state  Neurological: Negative for dizziness, tremors, seizures, syncope, facial asymmetry, speech difficulty, weakness, light-headedness, numbness and headaches  Hematological: Negative for adenopathy  Does not bruise/bleed easily  Psychiatric/Behavioral: Negative for agitation, confusion and hallucinations  The patient is nervous/anxious  All other systems reviewed and are negative  Physical Exam  Physical Exam   Constitutional: He is oriented to person, place, and time  He appears well-developed and well-nourished  No distress  /85 (BP Location: Right arm)   Pulse 60   Temp 98 3 °F (36 8 °C) (Oral)   Resp 18   Ht 5' 10" (1 778 m)   Wt 132 kg (290 lb)   SpO2 99%   BMI 41 61 kg/m²    HENT:   Head: Normocephalic and atraumatic  Right Ear: External ear normal    Left Ear: External ear normal    Nose: Nose normal    Mouth/Throat: Oropharynx is clear and moist  No oropharyngeal exudate  Eyes: Pupils are equal, round, and reactive to light  Conjunctivae and EOM are normal  Right eye exhibits no discharge  Left eye exhibits no discharge  No scleral icterus  Neck: Normal range of motion  Neck supple  No tracheal deviation present  No thyromegaly present  Cardiovascular: Normal rate, regular rhythm and intact distal pulses  Pulmonary/Chest: Effort normal and breath sounds normal  No stridor  No respiratory distress  He has no wheezes  He has no rales  He exhibits no tenderness  Abdominal: Soft  Bowel sounds are normal  He exhibits no distension and no mass  There is tenderness  There is no rebound and no guarding  Epigastric tenderness to palpation  No pulsatile masses  No rigidity or guarding     Musculoskeletal: Normal range of motion  He exhibits no edema, tenderness or deformity  Lymphadenopathy:     He has no cervical adenopathy  Neurological: He is alert and oriented to person, place, and time  He displays normal reflexes  No cranial nerve deficit or sensory deficit  He exhibits normal muscle tone  Coordination normal    Skin: Skin is warm and dry  Capillary refill takes less than 2 seconds  No rash noted  He is not diaphoretic  No erythema  No pallor  Psychiatric: He has a normal mood and affect  His behavior is normal  Judgment and thought content normal    Nursing note and vitals reviewed        Vital Signs  ED Triage Vitals [09/04/19 0820]   Temperature Pulse Respirations Blood Pressure SpO2   98 3 °F (36 8 °C) 60 18 166/85 99 %      Temp Source Heart Rate Source Patient Position - Orthostatic VS BP Location FiO2 (%)   Oral Monitor Sitting Right arm --      Pain Score       --           Vitals:    09/04/19 0820 09/04/19 1000 09/04/19 1130 09/04/19 1330   BP: 166/85 149/69 146/67 132/61   Pulse: 60 (!) 45 59 59   Patient Position - Orthostatic VS: Sitting Lying Lying Lying         Visual Acuity      ED Medications  Medications   sodium chloride 0 9 % infusion (has no administration in time range)   acetaminophen (TYLENOL) tablet 650 mg (has no administration in time range)   HYDROmorphone (DILAUDID) injection 0 5 mg (has no administration in time range)     Or   HYDROmorphone (DILAUDID) injection 1 mg (has no administration in time range)   promethazine (PHENERGAN) injection 25 mg (has no administration in time range)   sodium chloride 0 9 % bolus 1,000 mL (0 mL Intravenous Stopped 9/4/19 1403)   metoclopramide (REGLAN) injection 10 mg (10 mg Intramuscular Given 9/4/19 0849)   haloperidol lactate (HALDOL) injection 5 mg (5 mg Intramuscular Given 9/4/19 0845)   diphenhydrAMINE (BENADRYL) injection 25 mg (25 mg Intramuscular Given 9/4/19 1012)   metoclopramide (REGLAN) injection 10 mg (10 mg Intramuscular Given 9/4/19 1009) Diagnostic Studies  Results Reviewed     Procedure Component Value Units Date/Time    Lactic acid, plasma [[id number]]  (Normal) Collected:  09/04/19 0903    Lab Status:  Final result Specimen:  Blood from Hand, Right Updated:  09/04/19 0936     LACTIC ACID 0 9 mmol/L     Narrative:       Result may be elevated if tourniquet was used during collection      Troponin I [[id number]]  (Normal) Collected:  09/04/19 0857    Lab Status:  Final result Specimen:  Blood from Arm, Right Updated:  09/04/19 0924     Troponin I <0 02 ng/mL     Comprehensive metabolic panel [[id number]]  (Abnormal) Collected:  09/04/19 0857    Lab Status:  Final result Specimen:  Blood from Arm, Right Updated:  09/04/19 0920     Sodium 140 mmol/L      Potassium 4 2 mmol/L      Chloride 101 mmol/L      CO2 27 mmol/L      ANION GAP 12 mmol/L      BUN 9 mg/dL      Creatinine 0 64 mg/dL      Glucose 119 mg/dL      Calcium 8 9 mg/dL      AST 23 U/L      ALT 22 U/L      Alkaline Phosphatase 79 U/L      Total Protein 7 5 g/dL      Albumin 3 1 g/dL      Total Bilirubin 0 50 mg/dL      eGFR 122 ml/min/1 73sq m     Narrative:       [place] guidelines for Chronic Kidney Disease (CKD):     Stage 1 with normal or high GFR (GFR > 90 mL/min/1 73 square meters)    Stage 2 Mild CKD (GFR = 60-89 mL/min/1 73 square meters)    Stage 3A Moderate CKD (GFR = 45-59 mL/min/1 73 square meters)    Stage 3B Moderate CKD (GFR = 30-44 mL/min/1 73 square meters)    Stage 4 Severe CKD (GFR = 15-29 mL/min/1 73 square meters)    Stage 5 End Stage CKD (GFR <15 mL/min/1 73 square meters)  Note: GFR calculation is accurate only with a steady state creatinine    Lipase [[id number]]  (Abnormal) Collected:  09/04/19 0857    Lab Status:  Final result Specimen:  Blood from Arm, Right Updated:  09/04/19 0920     Lipase 412 u/L     Protime-INR [[id number]]  (Normal) Collected:  09/04/19 0857    Lab Status:  Final result Specimen:  Blood from Arm, Right Updated:  09/04/19 0917     Protime 13 2 seconds      INR 1 00    APTT [[id number]]  (Normal) Collected:  09/04/19 0857    Lab Status:  Final result Specimen:  Blood from Arm, Right Updated:  09/04/19 0917     PTT 29 seconds     CBC and differential [[id number]]  (Abnormal) Collected:  09/04/19 0857    Lab Status:  Final result Specimen:  Blood from Arm, Right Updated:  09/04/19 0906     WBC 10 04 Thousand/uL      RBC 4 91 Million/uL      Hemoglobin 13 2 g/dL      Hematocrit 42 6 %      MCV 87 fL      MCH 26 9 pg      MCHC 31 0 g/dL      RDW 15 6 %      MPV 10 8 fL      Platelets 272 Thousands/uL      nRBC 0 /100 WBCs      Neutrophils Relative 86 %      Immat GRANS % 0 %      Lymphocytes Relative 11 %      Monocytes Relative 3 %      Eosinophils Relative 0 %      Basophils Relative 0 %      Neutrophils Absolute 8 50 Thousands/µL      Immature Grans Absolute 0 04 Thousand/uL      Lymphocytes Absolute 1 12 Thousands/µL      Monocytes Absolute 0 33 Thousand/µL      Eosinophils Absolute 0 04 Thousand/µL      Basophils Absolute 0 01 Thousands/µL                  CT abdomen pelvis wo contrast   Final Result by Zuleika Duggan MD (09/04 1013)      Findings consistent with acute pancreatitis  There is questionable solid mass at junction of pancreatic body and tail and more accurate characterization with triple phase pancreatic protocol CT is strongly recommended to evaluate for possible pancreatic    adenocarcinoma  Thickening of the wall of the gastric antrum which could represent recurrent gastritis    Irregular soft tissue associated with the pancreas is inseparable from the serosal wall of the greater curvature of the stomach at 2 separate sites, gastric wall    thickening also could represent reactive inflammatory wall thickening related to pancreatitis; alternatively, local invasion of the serosal wall of the stomach related to pancreatic tumor can also not be excluded on the basis of this noncontrast exam  Hepatomegaly and hepatic steatosis  Prominent vessels in the upper abdomen could indicate early changes of portal hypertension though the possibility that there is splenic vein thrombosis related to pancreatic mass must also be considered  I personally discussed this study with [address] [place] [address] on 9/4/2019 at 9:52 AM                         Workstation performed: [id number]                    Procedures  ECG 12 Lead Documentation Only  Date/Time: 9/4/2019 8:44 AM  Performed by: Wilbur Miller PA-C  Authorized by: Wilbur Miller PA-C     Indications / Diagnosis:  Epigastric pain/vomiting  ECG reviewed by me, the ED Provider: yes    Patient location:  ED  Previous ECG:     Previous ECG:  Compared to current    Comparison ECG info:  June 29, 2019    Similarity:  No change    Comparison to cardiac monitor: Yes    Interpretation:     Interpretation: normal    Rate:     ECG rate:  61    ECG rate assessment: normal    Rhythm:     Rhythm: sinus rhythm    Ectopy:     Ectopy: none    QRS:     QRS axis:  Normal    QRS intervals:  Normal  Conduction:     Conduction: normal    ST segments:     ST segments:  Normal  T waves:     T waves: normal             ED Course  ED Course as of Sep 04 1421   Wed Sep 04, 2019   0949 Patient now retching    Will repeat reglan and add benadryl            HEART Risk Score      Most Recent Value   History  1 Filed at: 09/04/2019 1418   ECG  0 Filed at: 09/04/2019 1418   Age  0 Filed at: 09/04/2019 1418   Risk Factors  2 Filed at: 09/04/2019 1418   Troponin  0 Filed at: 09/04/2019 1418   Heart Score Risk Calculator   History  1 Filed at: 09/04/2019 1418   ECG  0 Filed at: 09/04/2019 1418   Age  0 Filed at: 09/04/2019 1418   Risk Factors  2 Filed at: 09/04/2019 1418   Troponin  0 Filed at: 09/04/2019 1418   HEART Score  3 Filed at: 09/04/2019 1418   HEART Score  3 Filed at: 09/04/2019 1418                    OTTONIEL Risk Score      Most Recent Value   Age >= 65  0 Filed at: 09/04/2019 1418   Known CAD (stenosis >= 50%)  1 Filed at: 09/04/2019 1418   Recent (<=24 hrs) Service Angina  0 Filed at: 09/04/2019 1418   ST Deviation >= 0 5 mm  0 Filed at: 09/04/2019 1418   3+ CAD Risk Factors (FHx, HTN, HLP, DM, Smoker)  1 Filed at: 09/04/2019 1418   Aspirin Use Past 7 Days  0 Filed at: 09/04/2019 1418   Elevated Cardiac Markers  0 Filed at: 09/04/2019 1418   OTTONIEL Risk Score (Calculated)  2 Filed at: 09/04/2019 1418              MDM  Number of Diagnoses or Management Options  Epigastric pain: new and requires workup  Gastritis: new and requires workup  Intractable vomiting: new and requires workup  Diagnosis management comments:   Differential diagnosis includes but is not limited to appendicitis, diverticulitis, gastroenteritis, gastritis, cholecystitis, pancreatitis, mesenteric adenitis, kidney stone, pyelonephritis, UTI  Consider anginal equivalent, consider methadone withdrawal Plan workup including labs, ct scan abd/pelvis, EKG, troponin,    Lab results reviewed  Lactic acid normal at 0 9  CBC demonstrates normal white blood cell count of 10 0  Hemoglobin of 13 2 and hematocrit of 42 6 are normal   No anemia  INR is normal 1 0  No coagulopathy  Comprehensive metabolic panel was reviewed, BUN of 9 and creatinine of 0 64 are normal   No renal failure  Lipase Elevated at 412  Troponin is normal at less than 0 02  CT of the abdomen and pelvis images visualized by me  Radiology report was reviewed  Findings consistent with acute pancreatitis  Brandon Rangel is questionable solid mass at junction of pancreatic body and tail and more accurate characterization with triple phase pancreatic protocol CT is strongly recommended to evaluate for possible pancreatic   adenocarcinoma      Thickening of the wall of the gastric antrum which could represent recurrent gastritis   Irregular soft tissue associated with the pancreas is inseparable from the serosal wall of the greater curvature of the stomach at 2 separate sites, gastric wall   thickening also could represent reactive inflammatory wall thickening related to pancreatitis; alternatively, local invasion of the serosal wall of the stomach related to pancreatic tumor can also not be excluded on the basis of this noncontrast exam     Hepatomegaly and hepatic steatosis   Prominent vessels in the upper abdomen could indicate early changes of portal hypertension though the possibility that there is splenic vein thrombosis related to pancreatic mass must also be considered  Patient arrived with complaints of epigastric pain and multiple episodes of vomiting  He reports he has not been able to take his home medications due to vomiting for the past 2 days  Patient's lipase is elevated and CT scan with findings consistent with acute pancreatitis  There is a questionable pancreatic mass versus possible changes representing recurrent gastritis  Either way the patient required multiple rounds of intermuscular and intravenous antiemetics in order to control his vomiting  He still has the feeling of nausea hence concern regarding CT for discharge home given his high likelihood of recurrence of symptoms and need to return to the emergency department  Giving the pancreatitis findings with no prior history and the concern for pancreatic mass in addition to repetitive vomiting will admit patient for further evaluation and management of symptoms  Patient does not meet sepsis criteria  He will require further workup  He reports a history of severe allergic reaction with CT contrast dye preventing a more detailed exam during his time in the emergency department  Patient has also not been able to take chronic medications including methadone for the past 2 days due to vomiting, and may also have component of medication withdrawal complicating his constellation of symptoms     Case was discussed with Dr Melia Lanes regarding admission for intractable nausea with vomiting and abnormal ct scan concerning for possible pancreatitic mass and recurrent gastritis  Amount and/or Complexity of Data Reviewed  Clinical lab tests: ordered and reviewed  Tests in the radiology section of CPT®: ordered and reviewed  Tests in the medicine section of CPT®: ordered and reviewed  Discussion of test results with the performing providers: yes  Review and summarize past medical records: yes  Discuss the patient with other providers: yes  Independent visualization of images, tracings, or specimens: yes        Disposition  Final diagnoses:   Intractable vomiting   Epigastric pain   Gastritis     Time reflects when diagnosis was documented in both MDM as applicable and the Disposition within this note     Time User Action Codes Description Comment    9/4/2019 12:09 PM Thersa Karthik Add [R11 10] Intractable vomiting     9/4/2019 12:09 PM Thersa Karthik Add [R10 13] Epigastric pain     9/4/2019 12:09 PM Thersa Karthik Add [K29 70] Gastritis     9/4/2019  1:12 PM Gaetano Hopinky Add [E66 01,  Z68 41] Morbid obesity with BMI of 40 0-44 9, adult ([facility] [address] )     9/4/2019  1:14 PM Gaetano Hoop Add [K85 90] Acute pancreatitis     9/4/2019  1:14 PM Price Waterman Add [K86 9] Pancreatic mass       ED Disposition     ED Disposition Condition Date/Time Comment    Admit Stable Wed Sep 4, 2019 12:08 PM Case was discussed with Dr Shanelle Salter and the patient's admission status was agreed to be Admission Status: inpatient status to the service of Dr Frances Siddiqi           Follow-up Information     Follow up With Specialties Details Why Contact Info Additional Information    [facility] Call in 1 week(s) To check for obstructive sleep apnea [address] [facility]  [facility] , [facility], [address], [place], [address]  To enter the [address], you must use the exterior entrance to [address]   There is an intercom at the door which you will push to gain entrance            Current Discharge Medication List      CONTINUE these medications which have NOT CHANGED    Details   metoclopramide (REGLAN) 10 mg tablet Take 1 tablet (10 mg total) by mouth every 6 (six) hours as needed (nausea/vomiting)  Qty: 30 tablet, Refills: 0    Associated Diagnoses: Nausea and vomiting               ED Provider  Electronically Signed by           Katlin Alanis PA-C  09/04/19 6911

## 2019-09-04 NOTE — PLAN OF CARE
Problem: Potential for Falls  Goal: Patient will remain free of falls  Description  INTERVENTIONS:  - Assess patient frequently for physical needs  -  Identify cognitive and physical deficits and behaviors that affect risk of falls    -  [place] fall precautions as indicated by assessment   - Educate patient/family on patient safety including physical limitations  - Instruct patient to call for assistance with activity based on assessment  - Modify environment to reduce risk of injury  - Consider OT/PT consult to assist with strengthening/mobility  Outcome: Progressing     Problem: PAIN - ADULT  Goal: Verbalizes/displays adequate comfort level or baseline comfort level  Description  Interventions:  - Encourage patient to monitor pain and request assistance  - Assess pain using appropriate pain scale  - Administer analgesics based on type and severity of pain and evaluate response  - Implement non-pharmacological measures as appropriate and evaluate response  - Consider cultural and social influences on pain and pain management  - Notify physician/advanced practitioner if interventions unsuccessful or patient reports new pain  Outcome: Progressing     Problem: SAFETY ADULT  Goal: Maintain or return to baseline ADL function  Description  INTERVENTIONS:  -  Assess patient's ability to carry out ADLs; assess patient's baseline for ADL function and identify physical deficits which impact ability to perform ADLs (bathing, care of mouth/teeth, toileting, grooming, dressing, etc )  - Assess/evaluate cause of self-care deficits   - Assess range of motion  - Assess patient's mobility; develop plan if impaired  - Assess patient's need for assistive devices and provide as appropriate  - Encourage maximum independence but intervene and supervise when necessary  - Involve family in performance of ADLs  - Assess for home care needs following discharge   - Consider OT consult to assist with ADL evaluation and planning for discharge  - Provide patient education as appropriate  Outcome: Progressing  Goal: Maintain or return mobility status to optimal level  Description  INTERVENTIONS:  - Assess patient's baseline mobility status (ambulation, transfers, stairs, etc )    - Identify cognitive and physical deficits and behaviors that affect mobility  - Identify mobility aids required to assist with transfers and/or ambulation (gait belt, sit-to-stand, lift, walker, cane, etc )  - [place] fall precautions as indicated by assessment  - Record patient progress and toleration of activity level on Mobility SBAR; progress patient to next Phase/Stage  - Instruct patient to call for assistance with activity based on assessment  - Consider rehabilitation consult to assist with strengthening/weightbearing, etc   Outcome: Progressing     Problem: DISCHARGE PLANNING  Goal: Discharge to home or other facility with appropriate resources  Description  INTERVENTIONS:  - Identify barriers to discharge w/patient and caregiver  - Arrange for needed discharge resources and transportation as appropriate  - Identify discharge learning needs (meds, wound care, etc )  - Arrange for interpretive services to assist at discharge as needed  - Refer to Case Management Department for coordinating discharge planning if the patient needs post-hospital services based on physician/advanced practitioner order or complex needs related to functional status, cognitive ability, or social support system  Outcome: Progressing

## 2019-09-04 NOTE — H&P
H&P- Sarah Henry 1980, 37 y o  male MRN: [id number]    Unit/Bed#: ED 28 Encounter: [id number]    Primary Care Provider: Tashia Ann DO   Date and time admitted to hospital: 9/4/2019  8:17 AM        * Acute pancreatitis  Assessment & Plan  · Admit to med/surg level of care  · Clear liquid diet for now  · NSS IV fluids  · Pain control  · PRN IV phenergan  · Possible pancreatic mass  · Check an MRCP/MRI of the pancreas  · Check a CA 19-9 level  · The patient denies any alcohol use  · Check a right upper quadrant ultrasound  · Check a fasting lipid profile to evaluate the patient for hypertriglyceridemia  · Consult Gastroenterology    Pancreatic mass  Assessment & Plan  · Possible pancreatic mass  · Check an MRCP/MRI of the pancreas  · Check a CA 19-9 level  · Consult Gastroenterology    Spleen anomaly  Assessment & Plan  · 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 thrombosis  · Check an ultrasound of the spleen for further evaluation    Opiate dependence, continuous (HCC)  Assessment & Plan  · On chronic methadone treatment for chronic neck/back pain  · Hold methadone during the hospitalization while utilizing PRN IV dilaudid    Tobacco use  Assessment & Plan  · Nicotine patch  · Smoking cessation counseling    Gastritis  Assessment & Plan  · Initiate protonix 40 mg IV every 24 hours  · Consult Gastroenterology    Morbid obesity with BMI of 40 0-44 9, adult ([facility] [address] )  Assessment & Plan  · Lifestyle modifications are recommended including weight loss, improving his diet, and increasing his amount of activity  · Check an outpatient sleep study to evaluate the patient for obstructive sleep apnea      Hepatic steatosis  Assessment & Plan  · Lifestyle modifications are recommended including weight loss, improving his diet, and increasing his amount of activity    · Consult Gastroenterology  · Outpatient surveillance imaging  · Check an acute hepatitis panel  · Follow the liver function tests    Hepatomegaly  Assessment & Plan  · Lifestyle modifications are recommended including weight loss, improving his diet, and increasing his amount of activity  · Consult Gastroenterology  · Outpatient surveillance imaging  · Check an acute hepatitis panel  · Check an AFP-tumor marker  · Follow the liver function tests      VTE Prophylaxis: Enoxaparin (Lovenox) 40 mg SQ every 12 hours based on his BMI/ sequential compression device   Code Status: Level 1-Full Code    Anticipated Length of Stay:  Patient will be admitted on an Inpatient basis with an anticipated length of stay of greater than 2 midnights  Justification for Hospital Stay:  The patient requires continuous IV fluids, PRN IV pain medications, serial abdominal examinations, a Gastroenterology consultation, and a work-up for the pancreatic mass  Total Time for Visit, including Counseling / Coordination of Care: 1 hour  Greater than 50% of this total time spent on direct patient counseling and coordination of care  Chief Complaint:  Epigastric abdominal pain with nausea and vomiting    History of Present Illness:    Nba Quinn is a 37 y o  male who presents to the emergency department with the complaints of intractable abdominal pain, nausea, and vomiting  Over the last 2 days, the patient has been experiencing an intermittent, severe abdominal pain located in the epigastric region  In addition, the patient has been experiencing multiple episodes of nausea and vomiting over the last 2 days  The patient has been experiencing difficulty with PO food or fluid intake secondary to the nausea vomiting  Nothing seemed to improve his symptoms  No chest pain  No shortness of breath  No fevers or chills  No diarrhea  Review of Systems:    Review of Systems:  Per HPI, all other systems have been reviewed and were negative      Past Medical and Surgical History:     Past Medical History:   Diagnosis Date    Cardiac disease     Hypertension        Past Surgical History: Procedure Laterality Date    BACK SURGERY      HERNIA REPAIR      KNEE SURGERY         Meds/Allergies:    Prior to Admission medications    Medication Sig Start Date End Date Taking? Authorizing Provider   metoclopramide (REGLAN) 10 mg tablet Take 1 tablet (10 mg total) by mouth every 6 (six) hours as needed (nausea/vomiting) 6/29/19   Gisele Woody MD     I have reviewed home medications with patient personally  Allergies: Allergies   Allergen Reactions    Ketorolac Tromethamine      Other reaction(s): Other (Please comment)  Pt goes into shock if given    Ondansetron      Other reaction(s): Neuro complications (Please comment)  "Hands curl up  Face goes numb " per patient   A friend observed that he gets "violently ill "    3 May 2013: Zofran was given intraoperatively   No adverse, or even minor reaction has been observed after about 3 hours   Penicillins Hives     Other reaction(s):  Other (Please comment)  Trouble breathing       Social History:     Marital Status: /Civil Union     Substance Use History:   Social History     Substance and Sexual Activity   Alcohol Use Not Currently     Social History     Tobacco Use   Smoking Status Current Every Day Smoker    Packs/day: 0 50   Smokeless Tobacco Never Used     Social History     Substance and Sexual Activity   Drug Use Never       Family History:    non-contributory    Physical Exam:     Vitals:   Blood Pressure: 146/67 (09/04/19 1130)  Pulse: 59 (09/04/19 1130)  Temperature: 98 3 °F (36 8 °C) (09/04/19 0820)  Temp Source: Oral (09/04/19 0820)  Respirations: 19 (09/04/19 1130)  Height: 5' 10" (177 8 cm) (09/04/19 0820)  Weight - Scale: 132 kg (290 lb) (09/04/19 0820)  SpO2: 95 % (09/04/19 1130)    Physical Exam  General:  NAD, awake, alert, follows commands  HEENT:  NC/AT, mucous membranes dry  Neck:  Supple, No JVP elevation  CV:  + S1, + S2, Bradycardic, Regular rhythm  Pulm:  Lung fields are CTA bilaterally  Abd:  Soft, + Epigastric abdominal pain with palpation, + Mild distension  Ext:  No clubbing/cyanosis/edema  Skin:  No rashes      Additional Data:     Lab Results: I have personally reviewed pertinent reports  Results from last 7 days   Lab Units 09/04/19  0857   WBC Thousand/uL 10 04   HEMOGLOBIN g/dL 13 2   HEMATOCRIT % 42 6   PLATELETS Thousands/uL 215   NEUTROS PCT % 86*   LYMPHS PCT % 11*   MONOS PCT % 3*   EOS PCT % 0     Results from last 7 days   Lab Units 09/04/19  0857   SODIUM mmol/L 140   POTASSIUM mmol/L 4 2   CHLORIDE mmol/L 101   CO2 mmol/L 27   BUN mg/dL 9   CREATININE mg/dL 0 64   ANION GAP mmol/L 12   CALCIUM mg/dL 8 9   ALBUMIN g/dL 3 1*   TOTAL BILIRUBIN mg/dL 0 50   ALK PHOS U/L 79   ALT U/L 22   AST U/L 23   GLUCOSE RANDOM mg/dL 119     Results from last 7 days   Lab Units 09/04/19  0857   INR  1 00             Results from last 7 days   Lab Units 09/04/19  0903   LACTIC ACID mmol/L 0 9       Imaging: I have personally reviewed pertinent reports  CT abdomen pelvis wo contrast   Final Result by Zuleika Duggan MD (09/04 1013)      Findings consistent with acute pancreatitis  There is questionable solid mass at junction of pancreatic body and tail and more accurate characterization with triple phase pancreatic protocol CT is strongly recommended to evaluate for possible pancreatic    adenocarcinoma  Thickening of the wall of the gastric antrum which could represent recurrent gastritis  Irregular soft tissue associated with the pancreas is inseparable from the serosal wall of the greater curvature of the stomach at 2 separate sites, gastric wall    thickening also could represent reactive inflammatory wall thickening related to pancreatitis; alternatively, local invasion of the serosal wall of the stomach related to pancreatic tumor can also not be excluded on the basis of this noncontrast exam       Hepatomegaly and hepatic steatosis    Prominent vessels in the upper abdomen could indicate early changes of portal hypertension though the possibility that there is splenic vein thrombosis related to pancreatic mass must also be considered  I personally discussed this study with [address] [place] [address] on 9/4/2019 at 9:52 AM                         Workstation performed: [id number]             EKG, Pathology, and Other Studies Reviewed on Admission:   · EKG (09/04/2019):  Normal sinus rhythm with a heart rate of 61 bpm    Allscripts / Epic Records Reviewed: Yes     ** Please Note: This note has been constructed using a voice recognition system   **

## 2019-09-04 NOTE — ED NOTES
1  CC: Abdominal pain/Vomiting    2  IS THIS ADMISSION RELATED TO AN INJURY? no    3  ORIENTATION STATUS: A&Ox4    4  ABNORMAL LABS/FOCUSED ASSESSMENT: N/A, GI    5  MEDICATIONS/DRIPS: None    6  NARCOTICS TIME/PAIN ASSESSMENT: None    7  IV LINES/ DRAINS/ ETC: 20G RAC    8  ISOLATION STATUS: None    9  SKIN: N/A    10  AMBULATION STATUS: Self    11   ED PHONE NUMBER: [phone]  09/04/19 2538

## 2019-09-04 NOTE — ASSESSMENT & PLAN NOTE
· Lifestyle modifications are recommended including weight loss, improving his diet, and increasing his amount of activity    · Consult Gastroenterology  · Outpatient surveillance imaging  · Check an acute hepatitis panel  · Check an AFP-tumor marker  · Follow the liver function tests

## 2019-09-04 NOTE — ASSESSMENT & PLAN NOTE
· Lifestyle modifications are recommended including weight loss, improving his diet, and increasing his amount of activity    · Consult Gastroenterology  · Outpatient surveillance imaging  · Check an acute hepatitis panel  · Follow the liver function tests

## 2019-09-05 ENCOUNTER — APPOINTMENT (INPATIENT)
Dept: MRI IMAGING | Facility: HOSPITAL | Age: 39
DRG: 663 | End: 2019-09-05
Payer: COMMERCIAL

## 2019-09-05 ENCOUNTER — TELEPHONE (OUTPATIENT)
Dept: GASTROENTEROLOGY | Facility: CLINIC | Age: 39
End: 2019-09-05

## 2019-09-05 LAB
AFP-TM SERPL-MCNC: 1.4 NG/ML (ref 0.5–8)
ALBUMIN SERPL BCP-MCNC: 2.7 G/DL (ref 3.5–5)
ALP SERPL-CCNC: 69 U/L (ref 46–116)
ALT SERPL W P-5'-P-CCNC: 22 U/L (ref 12–78)
ANION GAP SERPL CALCULATED.3IONS-SCNC: 10 MMOL/L (ref 4–13)
AST SERPL W P-5'-P-CCNC: 15 U/L (ref 5–45)
BASOPHILS # BLD AUTO: 0 THOUSANDS/ΜL (ref 0–0.1)
BASOPHILS NFR BLD AUTO: 0 % (ref 0–1)
BILIRUB SERPL-MCNC: 0.3 MG/DL (ref 0.2–1)
BUN SERPL-MCNC: 9 MG/DL (ref 5–25)
CALCIUM SERPL-MCNC: 8.3 MG/DL (ref 8.3–10.1)
CEA SERPL-MCNC: <0.5 NG/ML (ref 0–3)
CHLORIDE SERPL-SCNC: 104 MMOL/L (ref 100–108)
CHOLEST SERPL-MCNC: 145 MG/DL (ref 50–200)
CK SERPL-CCNC: 39 U/L (ref 39–308)
CO2 SERPL-SCNC: 26 MMOL/L (ref 21–32)
CREAT SERPL-MCNC: 0.61 MG/DL (ref 0.6–1.3)
EOSINOPHIL # BLD AUTO: 0.07 THOUSAND/ΜL (ref 0–0.61)
EOSINOPHIL NFR BLD AUTO: 1 % (ref 0–6)
ERYTHROCYTE [DISTWIDTH] IN BLOOD BY AUTOMATED COUNT: 15.2 % (ref 11.6–15.1)
EST. AVERAGE GLUCOSE BLD GHB EST-MCNC: 126 MG/DL
GFR SERPL CREATININE-BSD FRML MDRD: 124 ML/MIN/1.73SQ M
GLUCOSE SERPL-MCNC: 101 MG/DL (ref 65–140)
HAV IGM SER QL: NORMAL
HBA1C MFR BLD: 6 % (ref 4.2–6.3)
HBV CORE IGM SER QL: NORMAL
HBV SURFACE AG SER QL: NORMAL
HCT VFR BLD AUTO: 36.7 % (ref 36.5–49.3)
HCV AB SER QL: NORMAL
HDLC SERPL-MCNC: 28 MG/DL (ref 40–60)
HGB BLD-MCNC: 11.6 G/DL (ref 12–17)
IMM GRANULOCYTES # BLD AUTO: 0.04 THOUSAND/UL (ref 0–0.2)
IMM GRANULOCYTES NFR BLD AUTO: 0 % (ref 0–2)
LACTATE SERPL-SCNC: 1 MMOL/L (ref 0.5–2)
LDLC SERPL CALC-MCNC: 100 MG/DL (ref 0–100)
LIPASE SERPL-CCNC: 479 U/L (ref 73–393)
LYMPHOCYTES # BLD AUTO: 1.63 THOUSANDS/ΜL (ref 0.6–4.47)
LYMPHOCYTES NFR BLD AUTO: 17 % (ref 14–44)
MAGNESIUM SERPL-MCNC: 2 MG/DL (ref 1.6–2.6)
MCH RBC QN AUTO: 26.5 PG (ref 26.8–34.3)
MCHC RBC AUTO-ENTMCNC: 31.6 G/DL (ref 31.4–37.4)
MCV RBC AUTO: 84 FL (ref 82–98)
MONOCYTES # BLD AUTO: 0.54 THOUSAND/ΜL (ref 0.17–1.22)
MONOCYTES NFR BLD AUTO: 6 % (ref 4–12)
NEUTROPHILS # BLD AUTO: 7.07 THOUSANDS/ΜL (ref 1.85–7.62)
NEUTS SEG NFR BLD AUTO: 76 % (ref 43–75)
NONHDLC SERPL-MCNC: 117 MG/DL
NRBC BLD AUTO-RTO: 0 /100 WBCS
PHOSPHATE SERPL-MCNC: 2.8 MG/DL (ref 2.7–4.5)
PLATELET # BLD AUTO: 120 THOUSANDS/UL (ref 149–390)
PMV BLD AUTO: 11.9 FL (ref 8.9–12.7)
POTASSIUM SERPL-SCNC: 3.4 MMOL/L (ref 3.5–5.3)
PROCALCITONIN SERPL-MCNC: <0.05 NG/ML
PROT SERPL-MCNC: 6.5 G/DL (ref 6.4–8.2)
RBC # BLD AUTO: 4.38 MILLION/UL (ref 3.88–5.62)
SODIUM SERPL-SCNC: 140 MMOL/L (ref 136–145)
TRIGL SERPL-MCNC: 87 MG/DL
TROPONIN I SERPL-MCNC: <0.02 NG/ML
WBC # BLD AUTO: 9.35 THOUSAND/UL (ref 4.31–10.16)

## 2019-09-05 PROCEDURE — 82105 ALPHA-FETOPROTEIN SERUM: CPT | Performed by: INTERNAL MEDICINE

## 2019-09-05 PROCEDURE — A9585 GADOBUTROL INJECTION: HCPCS | Performed by: PHYSICIAN ASSISTANT

## 2019-09-05 PROCEDURE — 74183 MRI ABD W/O CNTR FLWD CNTR: CPT

## 2019-09-05 PROCEDURE — 82378 CARCINOEMBRYONIC ANTIGEN: CPT | Performed by: INTERNAL MEDICINE

## 2019-09-05 PROCEDURE — 84100 ASSAY OF PHOSPHORUS: CPT | Performed by: INTERNAL MEDICINE

## 2019-09-05 PROCEDURE — 80053 COMPREHEN METABOLIC PANEL: CPT | Performed by: INTERNAL MEDICINE

## 2019-09-05 PROCEDURE — C9113 INJ PANTOPRAZOLE SODIUM, VIA: HCPCS | Performed by: INTERNAL MEDICINE

## 2019-09-05 PROCEDURE — 83735 ASSAY OF MAGNESIUM: CPT | Performed by: INTERNAL MEDICINE

## 2019-09-05 PROCEDURE — 86301 IMMUNOASSAY TUMOR CA 19-9: CPT | Performed by: INTERNAL MEDICINE

## 2019-09-05 PROCEDURE — 83036 HEMOGLOBIN GLYCOSYLATED A1C: CPT | Performed by: INTERNAL MEDICINE

## 2019-09-05 PROCEDURE — 80061 LIPID PANEL: CPT | Performed by: INTERNAL MEDICINE

## 2019-09-05 PROCEDURE — 87389 HIV-1 AG W/HIV-1&-2 AB AG IA: CPT | Performed by: INTERNAL MEDICINE

## 2019-09-05 PROCEDURE — 84484 ASSAY OF TROPONIN QUANT: CPT | Performed by: INTERNAL MEDICINE

## 2019-09-05 PROCEDURE — 84145 PROCALCITONIN (PCT): CPT | Performed by: INTERNAL MEDICINE

## 2019-09-05 PROCEDURE — 85025 COMPLETE CBC W/AUTO DIFF WBC: CPT | Performed by: INTERNAL MEDICINE

## 2019-09-05 PROCEDURE — 80074 ACUTE HEPATITIS PANEL: CPT | Performed by: INTERNAL MEDICINE

## 2019-09-05 PROCEDURE — 83690 ASSAY OF LIPASE: CPT | Performed by: INTERNAL MEDICINE

## 2019-09-05 PROCEDURE — 83605 ASSAY OF LACTIC ACID: CPT | Performed by: INTERNAL MEDICINE

## 2019-09-05 PROCEDURE — 99232 SBSQ HOSP IP/OBS MODERATE 35: CPT | Performed by: PHYSICIAN ASSISTANT

## 2019-09-05 PROCEDURE — 99233 SBSQ HOSP IP/OBS HIGH 50: CPT | Performed by: INTERNAL MEDICINE

## 2019-09-05 PROCEDURE — 82550 ASSAY OF CK (CPK): CPT | Performed by: INTERNAL MEDICINE

## 2019-09-05 RX ORDER — HYDRALAZINE HYDROCHLORIDE 20 MG/ML
5 INJECTION INTRAMUSCULAR; INTRAVENOUS EVERY 6 HOURS PRN
Status: DISCONTINUED | OUTPATIENT
Start: 2019-09-05 | End: 2019-09-06

## 2019-09-05 RX ORDER — ACETAMINOPHEN 325 MG/1
975 TABLET ORAL EVERY 6 HOURS PRN
Status: DISCONTINUED | OUTPATIENT
Start: 2019-09-05 | End: 2019-09-08 | Stop reason: HOSPADM

## 2019-09-05 RX ORDER — PANTOPRAZOLE SODIUM 40 MG/1
40 TABLET, DELAYED RELEASE ORAL
Status: DISCONTINUED | OUTPATIENT
Start: 2019-09-06 | End: 2019-09-08 | Stop reason: HOSPADM

## 2019-09-05 RX ORDER — METHADONE HYDROCHLORIDE 5 MG/1
110 TABLET ORAL DAILY
Status: DISCONTINUED | OUTPATIENT
Start: 2019-09-05 | End: 2019-09-05

## 2019-09-05 RX ORDER — METHADONE HYDROCHLORIDE 10 MG/ML
110 CONCENTRATE ORAL DAILY
Status: DISCONTINUED | OUTPATIENT
Start: 2019-09-05 | End: 2019-09-08 | Stop reason: HOSPADM

## 2019-09-05 RX ADMIN — SODIUM CHLORIDE 125 ML/HR: 0.9 INJECTION, SOLUTION INTRAVENOUS at 09:38

## 2019-09-05 RX ADMIN — PROMETHAZINE HYDROCHLORIDE 25 MG: 25 INJECTION INTRAMUSCULAR; INTRAVENOUS at 18:07

## 2019-09-05 RX ADMIN — ATENOLOL 25 MG: 25 TABLET ORAL at 08:33

## 2019-09-05 RX ADMIN — HYDRALAZINE HYDROCHLORIDE 5 MG: 20 INJECTION INTRAMUSCULAR; INTRAVENOUS at 23:32

## 2019-09-05 RX ADMIN — PROMETHAZINE HYDROCHLORIDE 25 MG: 25 INJECTION INTRAMUSCULAR; INTRAVENOUS at 11:09

## 2019-09-05 RX ADMIN — PANTOPRAZOLE SODIUM 40 MG: 40 INJECTION, POWDER, FOR SOLUTION INTRAVENOUS at 08:34

## 2019-09-05 RX ADMIN — HYDROMORPHONE HYDROCHLORIDE 1 MG: 1 INJECTION, SOLUTION INTRAMUSCULAR; INTRAVENOUS; SUBCUTANEOUS at 04:02

## 2019-09-05 RX ADMIN — PROMETHAZINE HYDROCHLORIDE 25 MG: 25 INJECTION INTRAMUSCULAR; INTRAVENOUS at 06:22

## 2019-09-05 RX ADMIN — PREGABALIN 75 MG: 75 CAPSULE ORAL at 17:31

## 2019-09-05 RX ADMIN — PREGABALIN 75 MG: 75 CAPSULE ORAL at 08:33

## 2019-09-05 RX ADMIN — PROMETHAZINE HYDROCHLORIDE 25 MG: 25 INJECTION INTRAMUSCULAR; INTRAVENOUS at 23:33

## 2019-09-05 RX ADMIN — HYDROMORPHONE HYDROCHLORIDE 1 MG: 1 INJECTION, SOLUTION INTRAMUSCULAR; INTRAVENOUS; SUBCUTANEOUS at 01:04

## 2019-09-05 RX ADMIN — PROMETHAZINE HYDROCHLORIDE 25 MG: 25 INJECTION INTRAMUSCULAR; INTRAVENOUS at 02:17

## 2019-09-05 RX ADMIN — METHADONE HYDROCHLORIDE 110 MG: 10 CONCENTRATE ORAL at 11:09

## 2019-09-05 RX ADMIN — GADOBUTROL 10 ML: 604.72 INJECTION INTRAVENOUS at 05:25

## 2019-09-05 RX ADMIN — LISINOPRIL 20 MG: 20 TABLET ORAL at 08:33

## 2019-09-05 RX ADMIN — HYDROMORPHONE HYDROCHLORIDE 1 MG: 1 INJECTION, SOLUTION INTRAMUSCULAR; INTRAVENOUS; SUBCUTANEOUS at 07:28

## 2019-09-05 NOTE — PROGRESS NOTES
GI Progress Note - [facility] 37 y o  male MRN: [id number]    Unit/Bed#: -01 Encounter: [id number]    Subjective: Mr  Satish Sanchez reports his symptoms improve when he is not eating  He reports he has been weaning off of his methadone as outpatient  Objective:     Vitals: Blood pressure (!) 172/86, pulse 60, temperature 98 2 °F (36 8 °C), temperature source Oral, resp  rate 16, height 5' 10" (1 778 m), weight 129 kg (284 lb 6 3 oz), SpO2 97 %  ,Body mass index is 40 81 kg/m²  Intake/Output Summary (Last 24 hours) at 9/5/2019 1431  Last data filed at 9/5/2019 1311  Gross per 24 hour   Intake 1827 08 ml   Output 300 ml   Net 1527 08 ml       Physical Exam:     General Appearance: Alert, appears stated age and cooperative  Lungs: Clear to auscultation bilaterally, no rales or rhonchi, no labored breathing/accessory muscle use  Heart: Regular rate and rhythm, S1, S2 normal, no murmur, click, rub or gallop  Abdomen: Soft, non-tender, non-distended; bowel sounds normal; no masses or no organomegaly  Extremities: No cyanosis, edema    Invasive Devices     Peripheral Intravenous Line            Peripheral IV 09/05/19 Left Arm less than 1 day                Lab Results:  Results from last 7 days   Lab Units 09/05/19  0542   WBC Thousand/uL 9 35   HEMOGLOBIN g/dL 11 6*   HEMATOCRIT % 36 7   PLATELETS Thousands/uL 120*   NEUTROS PCT % 76*   LYMPHS PCT % 17   MONOS PCT % 6   EOS PCT % 1     Results from last 7 days   Lab Units 09/05/19  0542   POTASSIUM mmol/L 3 4*   CHLORIDE mmol/L 104   CO2 mmol/L 26   BUN mg/dL 9   CREATININE mg/dL 0 61   CALCIUM mg/dL 8 3   ALK PHOS U/L 69   ALT U/L 22   AST U/L 15     Results from last 7 days   Lab Units 09/04/19  0857   INR  1 00     Results from last 7 days   Lab Units 09/05/19  0542   LIPASE u/L 479*       Imaging Studies: I have personally reviewed pertinent imaging studies      Ct Abdomen Pelvis Wo Contrast    Result Date: 9/4/2019  Impression: Findings consistent with acute pancreatitis  There is questionable solid mass at junction of pancreatic body and tail and more accurate characterization with triple phase pancreatic protocol CT is strongly recommended to evaluate for possible pancreatic  adenocarcinoma  Thickening of the wall of the gastric antrum which could represent recurrent gastritis  Irregular soft tissue associated with the pancreas is inseparable from the serosal wall of the greater curvature of the stomach at 2 separate sites, gastric wall thickening also could represent reactive inflammatory wall thickening related to pancreatitis; alternatively, local invasion of the serosal wall of the stomach related to pancreatic tumor can also not be excluded on the basis of this noncontrast exam  Hepatomegaly and hepatic steatosis  Prominent vessels in the upper abdomen could indicate early changes of portal hypertension though the possibility that there is splenic vein thrombosis related to pancreatic mass must also be considered  I personally discussed this study with [address] [place] [address] on 9/4/2019 at 9:52 AM  Workstation performed: [id number]     Mri Abdomen W Wo Contrast And Mrcp    Result Date: 9/5/2019  Impression: There is a cystic lesion at the surface of the body of the pancreas with the surrounding infiltration which extends to the greater curvature corresponding to the abnormality described on the CT, measuring 1 3 x 1 3 cm  This appears to extends into the pancreas with a narrow pedicle as seen in image 28 series 5  This is of indeterminate nature, this may be sequela of prior bout of pancreatitis, neoplasm less likely   There are additional cystic lesions in the submucosal/ intramural location in the stomach, these are indeterminate differential include infiltrating peripancreatic pseudocyst, cystic submucosal intrinsic stomach lesion or these may be sequela of  recent endoscopy performed at [facility] performed on July 9, 2019 Left adrenal cyst Additional cyst seen in the right upper abdomen lateral to the common bile duct measuring 1 5 x 1 5 cm  There is no solid pancreatic mass seen  In order to characterize these abnormalities short interval follow-up MRI in 2-3 months or endoscopic ultrasound can be considered  Hepatic steatosis No biliary dilation or choledocholithiasis  The study was marked in EPIC for significant notification   Workstation performed: [id number]       Assessment and Plan:     Vomiting  Elevated Lipase  - He does not meet criteria for pancreatitis as his lipase is 412 (needs to be 3x ULN), he does not have typical pancreatitis pain, and the noncontrasted CT has vague findings  - Plan for GES tomorrow to assess for gastroparesis  - Hold off on reglan use for now  - Other antiemetics PRN  - Clear liquid diet, small portions   - Suspect he may have gastroparesis related to chronic methadone use as his predominant symptom is vomiting over the past 6-7 months with epigastric soreness following the vomiting, his EGD in July with [facility]on showed gastritis and moderate amount of retained food suggesting gastroparesis     Abnormal CT  - CT A/P without contrast on admission showed hepatomegaly, hepatic steatosis with prominent surrounding vessels concerning for early portal HTN, peripancreatic edema, focal masslike density in the pancreatic body/tail measuring 5 9 x 2 9 cm, marked thickening of the gastric antrum consistent with gastritis  - MRI/MRCP showed diffuse hepatic steatosis, no biliary dilatation, normal CBD diameter, cystic lesion at the surface of the body of the pancreas extending to the greater curvature of the stomach of unclear etiology and additional cyst lesions in the submucosal/intramural location on the stomach, no solid pancreatic lesion  - Will plan for EGD/EUS as outpatient in the next several weeks for further evaluation    Chronic GERD  Gastritis  - EGD in July with Damon showed medium amount of food residue in the stomach, congested mucosa in antrum, diffuse gastritis, mucosal changes in duodenum  - Biopsy results unavailable  - Continue PPI  - CT findings of gastritis could be in the setting of persistent vomiting v related to submucosal/intramural cysts noted on MRI        The patient was seen and examined by Dr Hedrick Done

## 2019-09-05 NOTE — CASE MANAGEMENT
CM met with patient baseline information was obtained  Patient resides with his wife and a friend in a 1 story home with 3 steps to enter from the deck with handrails  Per patient he is disabled, does not use any assistive devices/DMEs  Per patient he is able to manage his ADLs  Pt denies VNA or rehab use in the past  Pt uses [facility], + PP and medications are affordable  Denies Substance Abuse or MH history  Noting he does smoke 1/2 PPD did speak to patient about smoking cessation  Patient does not have a POA/ AD  Pt stated his wife Elizabeth would be his health care representative  Pt does drive  Discussed with patient if he will have transport upon dc home and he said this would not be a problem  CM educated patient on the importance of follow up care upon discharge and the need to have a  PCP appointment post discharge  Pt was not agreeable for me to make an appointment as he will do this himself    CM reviewed discharge planning process including the following: identifying help at home, patient preference for discharge planning needs, pharmacy preference, and availability of treatment team to discuss questions or concerns patient and/or family may have regarding understanding medications and recognizing signs and symptoms once discharged  CM also encouraged patient to follow up with all recommended appointments after discharge  Patient advised of importance for patient and family to participate in managing patients medical well being  CM department will follow patient through discharge  DC plan is home with family, no needs identified  Js Kinney RN BSN Clinical Coordinator      [id number] Went back and spoke to patient after nursing report: Pt goes to [facility] clinic daily for Methadone 110 mg for his [facility] Addiction Management

## 2019-09-05 NOTE — ASSESSMENT & PLAN NOTE
· Possible pancreatic mass  · MRCP/MRI of the pancreas showed hepatic  Steatosis, normal CBD, however multiple cystic lesions - one of pancreas, another of stomach  · Check a CA 19-9 level  · Appreciate Gastroenterology consult   · Can do EGD/EUS outpatient

## 2019-09-05 NOTE — NURSING NOTE
Pt refusing to wear SCD's  Pt educated on the use of SCD's and the DVT prevention  Pt continues to refuse wearing SCD's  SCD refusal form signed by pt

## 2019-09-05 NOTE — UTILIZATION REVIEW
Notification of Inpatient Admission/Inpatient Authorization Request  This is a Notification of Inpatient Admission/Request for Inpatient Authorization for our facility [address]  Be advised that this patient was admitted to our facility under Inpatient Status  Please contact the Utilization Review Department where the patient is receiving care services for additional admission information  Place of Service Code: 24   Place of Service Name: Inpatient Hospital  Presentation Date & Time: 9/4/2019  8:17 AM  Inpatient Admission Date & Time: 9/4/19 1154  Discharge Date & Time: No discharge date for patient encounter  Discharge Disposition (if discharged): Home/Self Care  Attending Physician and NPI#: Vero Tonyfaustina, [address] [[id number]]   [address] [place] [address]  Phone 1: [phone]  Phone 2:   Fax: [phone]  Admission Orders (From admission, onward)     Ordered        09/04/19 1154  Inpatient Admission  Once                   Facility: [place] [address]  Utilization Review Department  Phone: [phone]; Fax [phone]  [email or web address]  ATTENTION: Please call with any questions or concerns to [phone]  and carefully listen to the prompts so that you are directed to the right person  Send all requests for admission clinical reviews, approved or denied determinations and any other requests to fax [phone]   All voicemails are confidential

## 2019-09-05 NOTE — PROGRESS NOTES
Progress Note - Luanne De León 1980, 37 y o  male MRN: [id number]  Unit/Bed#: -[facility] Encounter: [id number]  Primary Care Provider: Sosa Garcia DO   Date and time admitted to hospital: 9/4/2019  8:17 AM    * Elevated Lipase  Assessment & Plan  · Elevated Lipase, not full pancreatitis  · Clear liquid diet for now  · NSS IV fluids  · Pain control  · PRN IV phenergan  · Possible pancreatic mass  · Noted MRCP/MRI results, see below  · Check a CA 19-9 level  · The patient denies any alcohol use  · Check a fasting lipid profile to evaluate the patient for hypertriglyceridemia  · Appreciate Gastroenterology  · GES on 9/6    Opiate dependence, continuous (HCC)  Assessment & Plan  · On chronic methadone treatment for chronic neck/back pain  · Avoid narcotic medications      Gastritis  Assessment & Plan  · Initiate protonix 40 mg IV every 24 hours  · Consult Gastroenterology    Pancreatic mass  Assessment & Plan  · Possible pancreatic mass  · MRCP/MRI of the pancreas showed hepatic  Steatosis, normal CBD, however multiple cystic lesions - one of pancreas, another of stomach  · Check a CA 19-9 level  · Appreciate Gastroenterology consult   · Can do EGD/EUS outpatient    Hepatomegaly  Assessment & Plan  · Lifestyle modifications are recommended including weight loss, improving his diet, and increasing his amount of activity  · Consult Gastroenterology  · Outpatient surveillance imaging  · Check an acute hepatitis panel  · Check an AFP-tumor marker  · Follow the liver function tests    Spleen anomaly  Assessment & Plan  · 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 thrombosis  · Can check an ultrasound of the spleen for further evaluation    Tobacco use  Assessment & Plan  · Nicotine patch  · Smoking cessation counseling    Morbid obesity with BMI of 40 0-44 9, adult (HCC)  Assessment & Plan  · Lifestyle modifications are recommended including weight loss, improving his diet, and increasing his amount of activity    · Check an outpatient sleep study to evaluate the patient for obstructive sleep apnea      Hepatic steatosis  Assessment & Plan  · Lifestyle modifications are recommended including weight loss, improving his diet, and increasing his amount of activity  · Consult Gastroenterology  · Outpatient surveillance imaging  · Check an acute hepatitis panel  · Follow the liver function tests    VTE Pharmacologic Prophylaxis:   Pharmacologic: Enoxaparin (Lovenox)  Mechanical VTE Prophylaxis in Place: Yes    Patient Centered Rounds: I have performed bedside rounds with nursing staff today  Discussions with Specialists or Other Care Team Provider:  GI, case management, nursing    Education and Discussions with Family / Patient:  Discussed with patient  Time Spent for Care: 30 minutes  More than 50% of total time spent on counseling and coordination of care as described above  Current Length of Stay: 1 day(s)    Current Patient Status: Inpatient   Certification Statement: The patient will continue to require additional inpatient hospital stay due to Gastric emptying study in a m  Discharge Plan:  Pending results of above; not medically ready    Code Status: Level 1 - Full Code      Subjective:   Patient with less vomiting  Mild pain  Denies fevers or chills  No shortness of breath  Objective:     Vitals:   Temp (24hrs), Av 3 °F (36 8 °C), Min:98 2 °F (36 8 °C), Max:98 3 °F (36 8 °C)    Temp:  [98 2 °F (36 8 °C)-98 3 °F (36 8 °C)] 98 2 °F (36 8 °C)  HR:  [60-65] 60  Resp:  [16-18] 16  BP: (128-172)/(61-86) 172/86  SpO2:  [97 %-98 %] 97 %  Body mass index is 40 81 kg/m²  Input and Output Summary (last 24 hours): Intake/Output Summary (Last 24 hours) at 2019 1504  Last data filed at 2019 1311  Gross per 24 hour   Intake 1827 08 ml   Output 300 ml   Net 1527 08 ml       Physical Exam:     Physical Exam   Constitutional: He appears well-developed and well-nourished  No distress     HENT:   Head: Normocephalic and atraumatic  Mouth/Throat: No oropharyngeal exudate  Eyes: Right eye exhibits no discharge  Left eye exhibits no discharge  No scleral icterus  Neck: No JVD present  No tracheal deviation present  No thyromegaly present  Cardiovascular: Regular rhythm  Exam reveals no gallop and no friction rub  No murmur heard  Pulmonary/Chest: Effort normal and breath sounds normal  No respiratory distress  He has no wheezes  He has no rales  He exhibits no tenderness  Abdominal: Soft  Bowel sounds are normal  He exhibits no distension  There is no tenderness  There is no rebound  Musculoskeletal: He exhibits no edema, tenderness or deformity  Skin: Skin is warm and dry  He is not diaphoretic  No erythema  No pallor  Psychiatric: He has a normal mood and affect  His behavior is normal    Nursing note and vitals reviewed  Additional Data:     Labs:    Results from last 7 days   Lab Units 09/05/19  0542   WBC Thousand/uL 9 35   HEMOGLOBIN g/dL 11 6*   HEMATOCRIT % 36 7   PLATELETS Thousands/uL 120*   NEUTROS PCT % 76*   LYMPHS PCT % 17   MONOS PCT % 6   EOS PCT % 1     Results from last 7 days   Lab Units 09/05/19  0542   SODIUM mmol/L 140   POTASSIUM mmol/L 3 4*   CHLORIDE mmol/L 104   CO2 mmol/L 26   BUN mg/dL 9   CREATININE mg/dL 0 61   ANION GAP mmol/L 10   CALCIUM mg/dL 8 3   ALBUMIN g/dL 2 7*   TOTAL BILIRUBIN mg/dL 0 30   ALK PHOS U/L 69   ALT U/L 22   AST U/L 15   GLUCOSE RANDOM mg/dL 101     Results from last 7 days   Lab Units 09/04/19  0857   INR  1 00         Results from last 7 days   Lab Units 09/05/19  0542   HEMOGLOBIN A1C % 6 0     Results from last 7 days   Lab Units 09/05/19  0542 09/04/19  0903   LACTIC ACID mmol/L 1 0 0 9   PROCALCITONIN ng/ml <0 05  --            * I Have Reviewed All Lab Data Listed Above  * Additional Pertinent Lab Tests Reviewed:  All Labs Within Last 24 Hours Reviewed      Recent Cultures (last 7 days):           Last 24 Hours Medication List:     Current Facility-Administered Medications:  acetaminophen 975 mg Oral Q6H PRN Danyelle De Dios PA-C    atenolol 25 mg Oral Daily Heriberto Brandt DO    enoxaparin 40 mg Subcutaneous Q12H [address] Robin Peacockr,     hydrALAZINE 5 mg Intravenous Q6H PRN Danyelle De Dios PA-C    lisinopril 20 mg Oral Daily Heriberto Brandt DO    methadone 110 mg Oral Daily Danyelle De Dios PA-C    nicotine 1 patch Transdermal Daily Heriberto Brandt DO    [START ON 9/6/2019] pantoprazole 40 mg Oral Early Morning Rupal Ba MD    pregabalin 75 mg Oral BID Heriberto Brandt DO    promethazine 25 mg Intravenous Q4H PRN Heriberto Brandt DO    sodium chloride 125 mL/hr Intravenous Continuous Mike Landers PA-C Last Rate: 125 mL/hr (09/05/19 8629)        Today, Patient Was Seen By: Adan Botello PA-C    ** Please Note: Dictation voice to text software may have been used in the creation of this document   **

## 2019-09-05 NOTE — PROGRESS NOTES
The pantoprazole has / have been converted to Oral per [facility] IV-to-PO Auto-Conversion Protocol for Adults as approved by the Pharmacy and Therapeutics Committee  The patient met all eligible criteria:  3 Age = 25years old   2) Received at least one dose of the IV form   3) Receiving at least one other scheduled oral/enteral medication   4) Tolerating an oral/enteral diet   and did not have any exclusions:   1) Critical care patient   2) Active GI bleed (IF assessing H2RAs or PPIs)   3) Continuous tube feeding (IF assessing cipro, doxycycline, levofloxacin, minocycline, rifampin, or voriconazole)   4) Receiving PO vancomycin (IF assessing metronidazole)   5) Persistent nausea and/or vomiting   6) Ileus or gastrointestinal obstruction   7) Sanju/nasogastric tube set for continuous suction   8) Specific order not to automatically convert to PO (in the order's comments or if discussed in the most recent Infectious Disease or primary team's progress notes)

## 2019-09-05 NOTE — TELEPHONE ENCOUNTER
----- Message from Ashanti Easton PA-C sent at 9/5/2019  4:08 PM EDT -----  This patient was seen in the hospital and needs to be scheduled for an EUS in the next 4-6 weeks with any available provider for any abnormal MRI/intramural and submucosal gastric cysts on MRI  He will be discharged over the weekend  Thanks!

## 2019-09-05 NOTE — UTILIZATION REVIEW
Initial Clinical Review    Admission: Date/Time/Statement: Inpatient Admission Orders (From admission, onward)     Ordered        09/04/19 1154  Inpatient Admission  Once                   Orders Placed This Encounter   Procedures    Inpatient Admission     Standing Status:   Standing     Number of Occurrences:   1     Order Specific Question:   Admitting Physician     Answer:   Chantale Garcia [id number]     Order Specific Question:   Level of Care     Answer:   Med Surg [16]     Order Specific Question:   Estimated length of stay     Answer:   More than 2 Midnights     Order Specific Question:   Certification     Answer:   I certify that inpatient services are medically necessary for this patient for a duration of greater than two midnights  See H&P and MD Progress Notes for additional information about the patient's course of treatment  ED Arrival Information     Expected Arrival Acuity Means of Arrival Escorted By Service Admission Type    - 9/4/2019 08:14 Urgent Walk-In Self Hospitalist Urgent    Arrival Complaint    SOB        Chief Complaint   Patient presents with    Vomiting     N/V x 2 days  pt states "I dont feel good"     Assessment/Plan: 38 yo male to ED from home w/ intractable abd pain , nause and vomiting  Over the past 2 days epigastric area   + N/V   In ED found to have pancreatitis   Admitted IP status and plan to treat w/IVF , pain control , prn phenergan iv, check RUQ US ,lipid profile and consult GI   Possible pancreatic mass check Ca19-9 , consult GI and check MRCP/MRI   9/4 GI consult   Elevated lipase   Clear liq diet , supportive care , antiemetics prn   Chronic GERD cont PPI , CT findings of gastritis   Hepatomegaly MRI/MRCP to eval pancreatic mass and CBD as it has been noted to be dilated 7 mm     ED Triage Vitals   Temperature Pulse Respirations Blood Pressure SpO2   09/04/19 0820 09/04/19 0820 09/04/19 0820 09/04/19 0820 09/04/19 0820   98 3 °F (36 8 °C) 60 18 166/85 99 % Temp Source Heart Rate Source Patient Position - Orthostatic VS BP Location FiO2 (%)   09/04/19 0820 09/04/19 0820 09/04/19 0820 09/04/19 0820 --   Oral Monitor Sitting Right arm       Pain Score       09/04/19 1435       4        Wt Readings from Last 1 Encounters:   09/04/19 128 kg (282 lb 9 6 oz)     Additional Vital Signs:   09/05/19 0657  98 2 °F (36 8 °C)  60  16  172/86Abnormal   97 %  None (Room air)  Lying   09/04/19 2325  98 3 °F (36 8 °C)  65  18  128/61  98 %  None (Room air)  Lying   09/04/19 1427  98 4 °F (36 9 °C)  67  17  144/78  96 %  None (Room air)  Lying   09/04/19 1330    59  16  132/61  97 %  None (Room air)  Lying   09/04/19 1130    59  19  146/67  95 %  None (Room air)  Lying   09/04/19 1000    45Abnormal   20  149/69  97 %  None (Room air)  Lying       Pertinent Labs/Diagnostic Test Results:   9/4 EKG-NSR   9/4 CT abd Findings consistent with acute pancreatitis  There is questionable solid mass at junction of pancreatic body and tail and more accurate characterization with triple phase pancreatic protocol CT is strongly recommended to evaluate for possible pancreatic   adenocarcinoma    Thickening of the wall of the gastric antrum which could represent recurrent gastritis  Irregular soft tissue associated with the pancreas is inseparable from the serosal wall of the greater curvature of the stomach at 2 separate sites, gastric wall   thickening also could represent reactive inflammatory wall thickening related to pancreatitis; alternatively, local invasion of the serosal wall of the stomach related to pancreatic tumor can also not be excluded on the basis of this noncontrast exam    Hepatomegaly and hepatic steatosis  Prominent vessels in the upper abdomen could indicate early changes of portal hypertension though the possibility that there is splenic vein thrombosis related to pancreatic mass must also be considered    9/5 MRI abd pain -   There is a cystic lesion at the surface of the body of the pancreas with the surrounding infiltration which extends to the greater curvature corresponding to the abnormality described on the CT, measuring 1 3 x 1 3 cm  This appears to extends into the   pancreas with a narrow pedicle as seen in image 28 series 5  This is of indeterminate nature, this may be sequela of prior bout of pancreatitis, neoplasm less likely    There are additional cystic lesions in the submucosal/ intramural location in the stomach, these are indeterminate differential include infiltrating peripancreatic pseudocyst, cystic submucosal intrinsic stomach lesion or these may be sequela of  recent   endoscopy performed at [facility] performed on July 9, 2019   Left adrenal cyst   Additional cyst seen in the right upper abdomen lateral to the common bile duct measuring 1 5 x 1 5 cm    There is no solid pancreatic mass seen     In order to characterize these abnormalities short interval follow-up MRI in 2-3 months or endoscopic ultrasound can be considered    Hepatic steatosis   No biliary dilation or choledocholithiasis    The study was marked in [facility] for significant notification  Results from last 7 days   Lab Units 09/05/19  0542 09/04/19  0857   WBC Thousand/uL 9 35 10 04   HEMOGLOBIN g/dL 11 6* 13 2   HEMATOCRIT % 36 7 42 6   PLATELETS Thousands/uL 120* 215   NEUTROS ABS Thousands/µL 7 07 8 50*       Results from last 7 days   Lab Units 09/05/19  0542 09/04/19  0857   SODIUM mmol/L 140 140   POTASSIUM mmol/L 3 4* 4 2   CHLORIDE mmol/L 104 101   CO2 mmol/L 26 27   ANION GAP mmol/L 10 12   BUN mg/dL 9 9   CREATININE mg/dL 0 61 0 64   EGFR ml/min/1 73sq m 124 122   CALCIUM mg/dL 8 3 8 9   MAGNESIUM mg/dL 2 0  --    PHOSPHORUS mg/dL 2 8  --      Results from last 7 days   Lab Units 09/05/19  0542 09/04/19  0857   AST U/L 15 23   ALT U/L 22 22   ALK PHOS U/L 69 79   TOTAL PROTEIN g/dL 6 5 7 5   ALBUMIN g/dL 2 7* 3 1*   TOTAL BILIRUBIN mg/dL 0 30 0 50     Results from last 7 days   Lab Units 09/05/19  0542 09/04/19  0857   GLUCOSE RANDOM mg/dL 101 119     Results from last 7 days   Lab Units 09/05/19  0542   HEMOGLOBIN A1C % 6 0   EAG mg/dl 126       Results from last 7 days   Lab Units 09/05/19  0542   CK TOTAL U/L 39     Results from last 7 days   Lab Units 09/05/19  0553 09/04/19  0857   TROPONIN I ng/mL <0 02 <0 02     Results from last 7 days   Lab Units 09/04/19  0857   PROTIME seconds 13 2   INR  1 00   PTT seconds 29     Results from last 7 days   Lab Units 09/05/19  0542 09/04/19  0903   LACTIC ACID mmol/L 1 0 0 9       Results from last 7 days   Lab Units 09/05/19  0542 09/04/19  0857   LIPASE u/L 479* 412*     Results from last 7 days   Lab Units 09/05/19  0542   CEA ng/mL <0 5         ED Treatment:   Medication Administration from 09/04/2019 0814 to 09/04/2019 1410       Date/Time Order Dose Route Action     09/04/2019 1214 sodium chloride 0 9 % bolus 1,000 mL 1,000 mL Intravenous New Bag     09/04/2019 0849 metoclopramide (REGLAN) injection 10 mg 10 mg Intramuscular Given     09/04/2019 0845 haloperidol lactate (HALDOL) injection 5 mg 5 mg Intramuscular Given     09/04/2019 1012 diphenhydrAMINE (BENADRYL) injection 25 mg 25 mg Intramuscular Given     09/04/2019 1009 metoclopramide (REGLAN) injection 10 mg 10 mg Intramuscular Given        Past Medical History:   Diagnosis Date    Cardiac disease     Hypertension      Present on Admission:   Hepatomegaly   Hepatic steatosis   Pancreatic mass   Acute pancreatitis   Gastritis   Tobacco use   Opiate dependence, continuous (HCC)      Admitting Diagnosis: Acute pancreatitis [K85 90]  Gastritis [K29 70]  Epigastric pain [R10 13]  SOB (shortness of breath) [R06 02]  Pancreatic mass [K86 9]  Intractable vomiting [R11 10]  Morbid obesity with BMI of 40 0-44 9, adult [facility] ) [E66 01, Z68 41]  Age/Sex: 37 y o  male  Admission Orders:    Current Facility-Administered Medications:  acetaminophen 650 mg Oral Q6H PRN     atenolol 25 mg Oral Daily     enoxaparin 40 mg Subcutaneous Q12H [address]     hydrALAZINE 5 mg Intravenous Q6H PRN     HYDROmorphone 0 5 mg Intravenous Q3H PRN     Or        HYDROmorphone 1 mg Intravenous Q3H PRN     lisinopril 20 mg Oral Daily     nicotine 1 patch Transdermal Daily     [START ON 9/6/2019] pantoprazole 40 mg Oral Early Morning     pregabalin 75 mg Oral BID     promethazine 25 mg Intravenous Q4H PRN x4    sodium chloride 125 mL/hr Intravenous Continuous       SCD  Up w/assist   Clear liq diet   I&O     IP CONSULT TO GASTROENTEROLOGY    Network Utilization Review Department  Phone: [phone]; Fax [phone]  [email or web address]  org  ATTENTION: Please call with any questions or concerns to [phone]  and carefully listen to the prompts so that you are directed to the right person  Send all requests for admission clinical reviews, approved or denied determinations and any other requests to fax [phone]   All voicemails are confidential

## 2019-09-05 NOTE — TELEPHONE ENCOUNTER
Hosp pt    [facility] called stating there are significant findings for pts MRI ABD   also text

## 2019-09-06 ENCOUNTER — APPOINTMENT (INPATIENT)
Dept: NUCLEAR MEDICINE | Facility: HOSPITAL | Age: 39
DRG: 663 | End: 2019-09-06
Payer: COMMERCIAL

## 2019-09-06 PROBLEM — R94.31 PROLONGED Q-T INTERVAL ON ECG: Status: ACTIVE | Noted: 2019-09-06

## 2019-09-06 PROBLEM — I10 HYPERTENSION: Status: ACTIVE | Noted: 2019-09-06

## 2019-09-06 LAB
CANCER AG19-9 SERPL-ACNC: 10 U/ML (ref 0–35)
HIV 1+2 AB+HIV1 P24 AG SERPL QL IA: NORMAL

## 2019-09-06 PROCEDURE — 78264 GASTRIC EMPTYING IMG STUDY: CPT

## 2019-09-06 PROCEDURE — A9541 TC99M SULFUR COLLOID: HCPCS

## 2019-09-06 PROCEDURE — 99232 SBSQ HOSP IP/OBS MODERATE 35: CPT | Performed by: PHYSICIAN ASSISTANT

## 2019-09-06 PROCEDURE — 93005 ELECTROCARDIOGRAM TRACING: CPT

## 2019-09-06 PROCEDURE — 99233 SBSQ HOSP IP/OBS HIGH 50: CPT | Performed by: INTERNAL MEDICINE

## 2019-09-06 RX ORDER — METOCLOPRAMIDE 10 MG/1
10 TABLET ORAL 3 TIMES DAILY PRN
Status: DISCONTINUED | OUTPATIENT
Start: 2019-09-06 | End: 2019-09-08 | Stop reason: HOSPADM

## 2019-09-06 RX ORDER — HYDRALAZINE HYDROCHLORIDE 20 MG/ML
10 INJECTION INTRAMUSCULAR; INTRAVENOUS EVERY 4 HOURS PRN
Status: DISCONTINUED | OUTPATIENT
Start: 2019-09-06 | End: 2019-09-08 | Stop reason: HOSPADM

## 2019-09-06 RX ORDER — ATENOLOL 50 MG/1
50 TABLET ORAL DAILY
Status: DISCONTINUED | OUTPATIENT
Start: 2019-09-07 | End: 2019-09-08 | Stop reason: HOSPADM

## 2019-09-06 RX ORDER — AMLODIPINE BESYLATE 10 MG/1
10 TABLET ORAL ONCE
Status: COMPLETED | OUTPATIENT
Start: 2019-09-06 | End: 2019-09-06

## 2019-09-06 RX ORDER — HYDRALAZINE HYDROCHLORIDE 20 MG/ML
5 INJECTION INTRAMUSCULAR; INTRAVENOUS EVERY 4 HOURS PRN
Status: DISCONTINUED | OUTPATIENT
Start: 2019-09-06 | End: 2019-09-06

## 2019-09-06 RX ORDER — METHADONE HYDROCHLORIDE 10 MG/1
10 TABLET ORAL ONCE
Status: COMPLETED | OUTPATIENT
Start: 2019-09-06 | End: 2019-09-06

## 2019-09-06 RX ORDER — SCOLOPAMINE TRANSDERMAL SYSTEM 1 MG/1
1 PATCH, EXTENDED RELEASE TRANSDERMAL
Status: DISCONTINUED | OUTPATIENT
Start: 2019-09-06 | End: 2019-09-08 | Stop reason: HOSPADM

## 2019-09-06 RX ORDER — AMLODIPINE BESYLATE 5 MG/1
5 TABLET ORAL ONCE
Status: COMPLETED | OUTPATIENT
Start: 2019-09-06 | End: 2019-09-06

## 2019-09-06 RX ADMIN — SCOPALAMINE 1 PATCH: 1 PATCH, EXTENDED RELEASE TRANSDERMAL at 12:22

## 2019-09-06 RX ADMIN — PANTOPRAZOLE SODIUM 40 MG: 40 TABLET, DELAYED RELEASE ORAL at 05:35

## 2019-09-06 RX ADMIN — PROMETHAZINE HYDROCHLORIDE 25 MG: 25 INJECTION INTRAMUSCULAR; INTRAVENOUS at 09:24

## 2019-09-06 RX ADMIN — SODIUM CHLORIDE 100 ML/HR: 0.9 INJECTION, SOLUTION INTRAVENOUS at 20:26

## 2019-09-06 RX ADMIN — SODIUM CHLORIDE 100 ML/HR: 0.9 INJECTION, SOLUTION INTRAVENOUS at 11:45

## 2019-09-06 RX ADMIN — METHADONE HYDROCHLORIDE 100 MG: 10 CONCENTRATE ORAL at 07:50

## 2019-09-06 RX ADMIN — HYDRALAZINE HYDROCHLORIDE 10 MG: 20 INJECTION INTRAMUSCULAR; INTRAVENOUS at 12:23

## 2019-09-06 RX ADMIN — AMLODIPINE BESYLATE 10 MG: 10 TABLET ORAL at 15:05

## 2019-09-06 RX ADMIN — ATENOLOL 25 MG: 25 TABLET ORAL at 07:48

## 2019-09-06 RX ADMIN — PREGABALIN 75 MG: 75 CAPSULE ORAL at 07:48

## 2019-09-06 RX ADMIN — METHADONE HYDROCHLORIDE 10 MG: 10 TABLET ORAL at 15:04

## 2019-09-06 RX ADMIN — AMLODIPINE BESYLATE 5 MG: 5 TABLET ORAL at 01:51

## 2019-09-06 RX ADMIN — HYDRALAZINE HYDROCHLORIDE 5 MG: 20 INJECTION INTRAMUSCULAR; INTRAVENOUS at 07:49

## 2019-09-06 RX ADMIN — PROMETHAZINE HYDROCHLORIDE 25 MG: 25 INJECTION INTRAMUSCULAR; INTRAVENOUS at 05:39

## 2019-09-06 RX ADMIN — LISINOPRIL 20 MG: 20 TABLET ORAL at 07:47

## 2019-09-06 RX ADMIN — PREGABALIN 75 MG: 75 CAPSULE ORAL at 17:55

## 2019-09-06 RX ADMIN — SODIUM CHLORIDE 125 ML/HR: 0.9 INJECTION, SOLUTION INTRAVENOUS at 02:59

## 2019-09-06 RX ADMIN — METOCLOPRAMIDE HYDROCHLORIDE 10 MG: 10 TABLET ORAL at 17:55

## 2019-09-06 NOTE — PROGRESS NOTES
Pt not able to tolerate nuclear medicine Gastric emptying study [place] to nausea and vomiting, MONICA Guzman notified

## 2019-09-06 NOTE — PROGRESS NOTES
Progress Note - Mary Jane Cross 37 y o  male MRN: [id number]    Unit/Bed#: -01 Encounter: [id number]        Subjective:     Patient was planned to have gastric emptying study today  He vomited during the 1st hour  Patient reports that he is not nauseous as long as he does not eat  He usually has vomiting after he starts eating  He denies abdominal pain  ROS: As noted in the HPI, otherwise all others negative  Objective:     Vitals: Blood pressure (!) 198/100, pulse 63, temperature 98 5 °F (36 9 °C), temperature source Oral, resp  rate 18, height 5' 10" (1 778 m), weight 129 kg (284 lb 6 3 oz), SpO2 97 %  ,Body mass index is 40 81 kg/m²  Intake/Output Summary (Last 24 hours) at 9/6/2019 1153  Last data filed at 9/6/2019 0900  Gross per 24 hour   Intake 3085 83 ml   Output    Net 3085 83 ml       Physical Exam:     General Appearance: Alert and oriented x 3   In no respiratory distress  Lungs: Clear to auscultation bilaterally, no rales or rhonchi  Heart: Regular rate and rhythm, S1, S2 normal, no murmur, click, rub or gallop  Abdomen: Soft, non-tender, non-distended; bowel sounds normal; no masses or no organomegaly  Extremities: No cyanosis, edema    Invasive Devices     Peripheral Intravenous Line            Peripheral IV 09/05/19 Left Arm 1 day                Lab Results:  Results from last 7 days   Lab Units 09/05/19  0542   WBC Thousand/uL 9 35   HEMOGLOBIN g/dL 11 6*   HEMATOCRIT % 36 7   PLATELETS Thousands/uL 120*   NEUTROS PCT % 76*   LYMPHS PCT % 17   MONOS PCT % 6   EOS PCT % 1     Results from last 7 days   Lab Units 09/05/19  0542   POTASSIUM mmol/L 3 4*   CHLORIDE mmol/L 104   CO2 mmol/L 26   BUN mg/dL 9   CREATININE mg/dL 0 61   CALCIUM mg/dL 8 3   ALK PHOS U/L 69   ALT U/L 22   AST U/L 15     Results from last 7 days   Lab Units 09/04/19  0857   INR  1 00     Results from last 7 days   Lab Units 09/05/19  0542   LIPASE u/L 479*       Imaging Studies: I have personally reviewed pertinent imaging studies  Ct Abdomen Pelvis Wo Contrast    Result Date: 9/4/2019  Impression: Findings consistent with acute pancreatitis  There is questionable solid mass at junction of pancreatic body and tail and more accurate characterization with triple phase pancreatic protocol CT is strongly recommended to evaluate for possible pancreatic  adenocarcinoma  Thickening of the wall of the gastric antrum which could represent recurrent gastritis  Irregular soft tissue associated with the pancreas is inseparable from the serosal wall of the greater curvature of the stomach at 2 separate sites, gastric wall thickening also could represent reactive inflammatory wall thickening related to pancreatitis; alternatively, local invasion of the serosal wall of the stomach related to pancreatic tumor can also not be excluded on the basis of this noncontrast exam  Hepatomegaly and hepatic steatosis  Prominent vessels in the upper abdomen could indicate early changes of portal hypertension though the possibility that there is splenic vein thrombosis related to pancreatic mass must also be considered  I personally discussed this study with [address] [place] [address] on 9/4/2019 at 9:52 AM  Workstation performed: [id number]     Nm Gastric Emptying    Result Date: 9/6/2019  Impression: Incomplete examination secondary to patient vomiting during acquisition of the 1st image  Workstation performed: [id number]     Mri Abdomen W Wo Contrast And Mrcp    Result Date: 9/5/2019  Impression: There is a cystic lesion at the surface of the body of the pancreas with the surrounding infiltration which extends to the greater curvature corresponding to the abnormality described on the CT, measuring 1 3 x 1 3 cm  This appears to extends into the pancreas with a narrow pedicle as seen in image 28 series 5  This is of indeterminate nature, this may be sequela of prior bout of pancreatitis, neoplasm less likely   There are additional cystic lesions in the submucosal/ intramural location in the stomach, these are indeterminate differential include infiltrating peripancreatic pseudocyst, cystic submucosal intrinsic stomach lesion or these may be sequela of  recent endoscopy performed at [facility] performed on July 9, 2019 Left adrenal cyst Additional cyst seen in the right upper abdomen lateral to the common bile duct measuring 1 5 x 1 5 cm  There is no solid pancreatic mass seen  In order to characterize these abnormalities short interval follow-up MRI in 2-3 months or endoscopic ultrasound can be considered  Hepatic steatosis No biliary dilation or choledocholithiasis  The study was marked in EPIC for significant notification  Workstation performed: [id number]         Assessment and Plan:     1) Nausea and vomiting - Patient was due to have gastric emptying study, however he was unable to tolerate the test   Patient is on chronic methadone  [place] [facility] [place] food in the stomach  He had MRCP that did not show any signs of pancreatitis or gallstones  May be secondary to gastroparesis  - Unfortunately, the patient does have prolonged QT  Therefore we cannot schedule antiemetics  - Will start scopolamine patch with as needed Reglan  - Clear liquid diet as tolerated    2) Cystic lesion in the body of the pancreas and in the submucosa of the stomach - Initially seen on CT  He had a MRCP done yesterday revealing a cystic lesion at the surface of the body of the pancreas measuring 1 3 x 1 3 cm  Per MRCP report, it appears to be possible sequela of pancreatitis  Otherwise, it is indeterminate in nature  There additional lesions in the submucosa of the stomach also indeterminate  These measure 3 3 x 2 5 cm and 2 3 x 2 4 cm    CA 19-9 was 10    - Will be planning for an EUS as an outpatient to evaluate these lesions

## 2019-09-06 NOTE — PLAN OF CARE
Problem: Potential for Falls  Goal: Patient will remain free of falls  Description  INTERVENTIONS:  - Assess patient frequently for physical needs  -  Identify cognitive and physical deficits and behaviors that affect risk of falls    -  [place] fall precautions as indicated by assessment   - Educate patient/family on patient safety including physical limitations  - Instruct patient to call for assistance with activity based on assessment  - Modify environment to reduce risk of injury  - Consider OT/PT consult to assist with strengthening/mobility  Outcome: Progressing     Problem: PAIN - ADULT  Goal: Verbalizes/displays adequate comfort level or baseline comfort level  Description  Interventions:  - Encourage patient to monitor pain and request assistance  - Assess pain using appropriate pain scale  - Administer analgesics based on type and severity of pain and evaluate response  - Implement non-pharmacological measures as appropriate and evaluate response  - Consider cultural and social influences on pain and pain management  - Notify physician/advanced practitioner if interventions unsuccessful or patient reports new pain  Outcome: Progressing     Problem: SAFETY ADULT  Goal: Maintain or return to baseline ADL function  Description  INTERVENTIONS:  -  Assess patient's ability to carry out ADLs; assess patient's baseline for ADL function and identify physical deficits which impact ability to perform ADLs (bathing, care of mouth/teeth, toileting, grooming, dressing, etc )  - Assess/evaluate cause of self-care deficits   - Assess range of motion  - Assess patient's mobility; develop plan if impaired  - Assess patient's need for assistive devices and provide as appropriate  - Encourage maximum independence but intervene and supervise when necessary  - Involve family in performance of ADLs  - Assess for home care needs following discharge   - Consider OT consult to assist with ADL evaluation and planning for discharge  - Provide patient education as appropriate  Outcome: Progressing  Goal: Maintain or return mobility status to optimal level  Description  INTERVENTIONS:  - Assess patient's baseline mobility status (ambulation, transfers, stairs, etc )    - Identify cognitive and physical deficits and behaviors that affect mobility  - Identify mobility aids required to assist with transfers and/or ambulation (gait belt, sit-to-stand, lift, walker, cane, etc )  - [place] fall precautions as indicated by assessment  - Record patient progress and toleration of activity level on Mobility SBAR; progress patient to next Phase/Stage  - Instruct patient to call for assistance with activity based on assessment  - Consider rehabilitation consult to assist with strengthening/weightbearing, etc   Outcome: Progressing     Problem: DISCHARGE PLANNING  Goal: Discharge to home or other facility with appropriate resources  Description  INTERVENTIONS:  - Identify barriers to discharge w/patient and caregiver  - Arrange for needed discharge resources and transportation as appropriate  - Identify discharge learning needs (meds, wound care, etc )  - Arrange for interpretive services to assist at discharge as needed  - Refer to Case Management Department for coordinating discharge planning if the patient needs post-hospital services based on physician/advanced practitioner order or complex needs related to functional status, cognitive ability, or social support system  Outcome: Progressing     Problem: Knowledge Deficit  Goal: Patient/family/caregiver demonstrates understanding of disease process, treatment plan, medications, and discharge instructions  Description  Complete learning assessment and assess knowledge base    Interventions:  - Provide teaching at level of understanding  - Provide teaching via preferred learning methods  Outcome: Progressing     Problem: GASTROINTESTINAL - ADULT  Goal: Minimal or absence of nausea and/or vomiting  Description  INTERVENTIONS:  - Administer IV fluids if ordered to ensure adequate hydration  - Maintain NPO status until nausea and vomiting are resolved  - Nasogastric tube if ordered  - Administer ordered antiemetic medications as needed  - Provide nonpharmacologic comfort measures as appropriate  - Advance diet as tolerated, if ordered  - Consider nutrition services referral to assist patient with adequate nutrition and appropriate food choices  Outcome: Progressing  Goal: Maintains or returns to baseline bowel function  Description  INTERVENTIONS:  - Assess bowel function  - Encourage oral fluids to ensure adequate hydration  - Administer IV fluids if ordered to ensure adequate hydration  - Administer ordered medications as needed  - Encourage mobilization and activity  - Consider nutritional services referral to assist patient with adequate nutrition and appropriate food choices  Outcome: Progressing  Goal: Maintains adequate nutritional intake  Description  INTERVENTIONS:  - Monitor percentage of each meal consumed  - Identify factors contributing to decreased intake, treat as appropriate  - Assist with meals as needed  - Monitor I&O, weight, and lab values if indicated  - Obtain nutrition services referral as needed  Outcome: Progressing  Goal: Establish and maintain optimal ostomy function  Description  INTERVENTIONS:  - Assess bowel function  - Encourage oral fluids to ensure adequate hydration  - Administer IV fluids if ordered to ensure adequate hydration   - Administer ordered medications as needed  - Encourage mobilization and activity  - Nutrition services referral to assist patient with appropriate food choices  - Assess stoma site  - Consider wound care consult   Outcome: Progressing     Problem: METABOLIC, FLUID AND ELECTROLYTES - ADULT  Goal: Electrolytes maintained within normal limits  Description  INTERVENTIONS:  - Monitor labs and assess patient for signs and symptoms of electrolyte imbalances  - Administer electrolyte replacement as ordered  - Monitor response to electrolyte replacements, including repeat lab results as appropriate  - Instruct patient on fluid and nutrition as appropriate  Outcome: Progressing  Goal: Fluid balance maintained  Description  INTERVENTIONS:  - Monitor labs   - Monitor I/O and WT  - Instruct patient on fluid and nutrition as appropriate  - Assess for signs & symptoms of volume excess or deficit  Outcome: Progressing     Problem: SKIN/TISSUE INTEGRITY - ADULT  Goal: Skin integrity remains intact  Description  INTERVENTIONS  - Identify patients at risk for skin breakdown  - Assess and monitor skin integrity  - Assess and monitor nutrition and hydration status  - Monitor labs (i e  albumin)  - Assess for incontinence   - Turn and reposition patient  - Assist with mobility/ambulation  - Relieve pressure over bony prominences  - Avoid friction and shearing  - Provide appropriate hygiene as needed including keeping skin clean and dry  - Evaluate need for skin moisturizer/barrier cream  - Collaborate with interdisciplinary team (i e  Nutrition, Rehabilitation, etc )   - Patient/family teaching  Outcome: Progressing  Goal: Oral mucous membranes remain intact  Description  INTERVENTIONS  - Assess oral mucosa and hygiene practices  - Implement preventative oral hygiene regimen  - Implement oral medicated treatments as ordered  - Initiate Nutrition services referral as needed  Outcome: Progressing     Problem: Nutrition/Hydration-ADULT  Goal: Nutrient/Hydration intake appropriate for improving, restoring or maintaining nutritional needs  Description  Monitor and assess patient's nutrition/hydration status for malnutrition  Collaborate with interdisciplinary team and initiate plan and interventions as ordered  Monitor patient's weight and dietary intake as ordered or per policy  Utilize nutrition screening tool and intervene as necessary   Determine patient's food preferences and provide high-protein, high-caloric foods as appropriate       INTERVENTIONS:  - Monitor oral intake, urinary output, labs, and treatment plans  - Assess nutrition and hydration status and recommend course of action  - Evaluate amount of meals eaten  - Assist patient with eating if necessary   - Allow adequate time for meals  - Recommend/ encourage appropriate diets, oral nutritional supplements, and vitamin/mineral supplements  - Order, calculate, and assess calorie counts as needed  - Recommend, monitor, and adjust tube feedings and TPN/PPN based on assessed needs  - Assess need for intravenous fluids  - Provide specific nutrition/hydration education as appropriate  - Include patient/family/caregiver in decisions related to nutrition  Outcome: Progressing

## 2019-09-06 NOTE — ASSESSMENT & PLAN NOTE
· Poorly controlled this admission, likely secondary to pain  Also vomited antihypertensives morning of 9/6  · Continue home medications of lisinopril 20 mg daily, atenolol - will increase dose to 50 mg daily  · Give Norvasc x1  · P r n  Hydralazine for BP spikes  · Pain control (patient states tolerates NSAIDs however anaphylactic reaction to Toradol); Tylenol, home dose of methadone

## 2019-09-06 NOTE — ASSESSMENT & PLAN NOTE
· Elevated Lipase, not full pancreatitis  · Advance diet as tolerated, will attempt full liquids  · NSS IV fluids - decreased rate to 100 cc/hour due to hypertension  · Pain control  · Scopolamine patch   P r n  Reglan  QT prolongation on EKG  · Possible pancreatic mass  · Noted MRCP/MRI results, see below  · Check a CA 19-9 level  · The patient denies any alcohol use  · Check a fasting lipid profile to evaluate the patient for hypertriglyceridemia  · Appreciate Gastroenterology  · Gastric emptying study on 09/06 not attempted due to to vomiting

## 2019-09-06 NOTE — CASE MANAGEMENT
Not ready for dc patient is off the floor for  gastric emptying study, IV fluids  DC plan is home with with family  CM department will follow patient through discharge          Blanca Kocher RN BSN Clinical Coordinator

## 2019-09-06 NOTE — PROGRESS NOTES
Progress Note - Ziyad Givens 1980, 37 y o  male MRN: [id number]  Unit/Bed#: -01 Encounter: [id number]  Primary Care Provider: Keegan Gonsalez DO   Date and time admitted to hospital: 9/4/2019  8:17 AM    * Elevated Lipase  Assessment & Plan  · Elevated Lipase, not full pancreatitis  · Advance diet as tolerated, will attempt full liquids  · NSS IV fluids - decreased rate to 100 cc/hour due to hypertension  · Pain control  · Scopolamine patch   P r n  Reglan  QT prolongation on EKG  · Possible pancreatic mass  · Noted MRCP/MRI results, see below  · Check a CA 19-9 level  · The patient denies any alcohol use  · Check a fasting lipid profile to evaluate the patient for hypertriglyceridemia  · Appreciate Gastroenterology  · Gastric emptying study on 09/06 not attempted due to to vomiting  Prolonged Q-T interval on ECG  Assessment & Plan  Scopolamine patch due to prolonged QT interval on EKG  Also p r n  Reglan  Repeat EKG    Hypertension  Assessment & Plan  · Poorly controlled this admission, likely secondary to pain  Also vomited antihypertensives morning of 9/6  · Continue home medications of lisinopril 20 mg daily, atenolol - will increase dose to 50 mg daily  · Give Norvasc x1  · P r n  Hydralazine for BP spikes  · Pain control (patient states tolerates NSAIDs however anaphylactic reaction to Toradol); Tylenol, home dose of methadone      Opiate dependence, continuous (HCC)  Assessment & Plan  · On chronic methadone treatment for chronic neck/back pain  · Avoid narcotic medications      Gastritis  Assessment & Plan  · Maintained on Protonix  · Appreciate Gastroenterology    Pancreatic mass  Assessment & Plan  · Possible pancreatic mass  · MRCP/MRI of the pancreas showed hepatic  Steatosis, normal CBD, however multiple cystic lesions - one of pancreas, another of stomach  · Check a CA 19-9 level  · Appreciate Gastroenterology consult   · Can do EGD/EUS outpatient    Hepatomegaly  Assessment & Plan  · Lifestyle modifications are recommended including weight loss, improving his diet, and increasing his amount of activity  · Consult Gastroenterology  · Outpatient surveillance imaging  · Check an acute hepatitis panel  · Check an AFP-tumor marker  · Follow the liver function tests    Spleen anomaly  Assessment & Plan  · 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 thrombosis  · Can check an ultrasound of the spleen for further evaluation    Tobacco use  Assessment & Plan  · Nicotine patch  · Smoking cessation counseling    Morbid obesity with BMI of 40 0-44 9, adult (HCC)  Assessment & Plan  · Lifestyle modifications are recommended including weight loss, improving his diet, and increasing his amount of activity  · Check an outpatient sleep study to evaluate the patient for obstructive sleep apnea      Hepatic steatosis  Assessment & Plan  · Lifestyle modifications are recommended including weight loss, improving his diet, and increasing his amount of activity  · Consult Gastroenterology  · Outpatient surveillance imaging  · Check an acute hepatitis panel  · Follow the liver function tests    VTE Pharmacologic Prophylaxis:   Pharmacologic: Enoxaparin (Lovenox)  Mechanical VTE Prophylaxis in Place: Yes    Patient Centered Rounds: I have performed bedside rounds with nursing staff today  Discussions with Specialists or Other Care Team Provider:  GI, case management, nursing    Education and Discussions with Family / Patient:  Discussed with patient    Time Spent for Care: 45 minutes  More than 50% of total time spent on counseling and coordination of care as described above  Current Length of Stay: 2 day(s)    Current Patient Status: Inpatient   Certification Statement: The patient will continue to require additional inpatient hospital stay due to Advancing diet, cyclical vomiting    Discharge Plan:  When medically appropriate    Code Status: Level 1 - Full Code      Subjective:    At baseline, patient without nausea however does vomit when he attempts to eat  He states he vomited this morning because the "eggs were bad  "He vomited his blood pressure medications as well as methadone states his abdominal pain is probably affecting his blood pressure  Able to have normal bowel movements  Objective:     Vitals:   Temp (24hrs), Av 4 °F (36 9 °C), Min:98 2 °F (36 8 °C), Max:98 5 °F (36 9 °C)    Temp:  [98 2 °F (36 8 °C)-98 5 °F (36 9 °C)] 98 5 °F (36 9 °C)  HR:  [55-65] 65  Resp:  [18] 18  BP: (143-198)/() 168/96  SpO2:  [97 %-98 %] 97 %  Body mass index is 40 81 kg/m²  Input and Output Summary (last 24 hours): Intake/Output Summary (Last 24 hours) at 2019 1534  Last data filed at 2019 1222  Gross per 24 hour   Intake 3245 83 ml   Output    Net 3245 83 ml       Physical Exam:     Physical Exam   Constitutional: He appears well-developed and well-nourished  No distress  HENT:   Head: Normocephalic and atraumatic  Mouth/Throat: No oropharyngeal exudate  Eyes: Right eye exhibits no discharge  Left eye exhibits no discharge  No scleral icterus  Neck: No JVD present  No tracheal deviation present  No thyromegaly present  Cardiovascular: Regular rhythm  Exam reveals no gallop and no friction rub  No murmur heard  Pulmonary/Chest: Effort normal and breath sounds normal  No respiratory distress  He has no wheezes  He has no rales  He exhibits no tenderness  Musculoskeletal: He exhibits no edema, tenderness or deformity  Skin: Skin is warm and dry  He is not diaphoretic  No erythema  No pallor  Psychiatric: He has a normal mood and affect   His behavior is normal        Additional Data:     Labs:    Results from last 7 days   Lab Units 19  0542   WBC Thousand/uL 9 35   HEMOGLOBIN g/dL 11 6*   HEMATOCRIT % 36 7   PLATELETS Thousands/uL 120*   NEUTROS PCT % 76*   LYMPHS PCT % 17   MONOS PCT % 6   EOS PCT % 1     Results from last 7 days   Lab Units 19  0542   SODIUM mmol/L 140 POTASSIUM mmol/L 3 4*   CHLORIDE mmol/L 104   CO2 mmol/L 26   BUN mg/dL 9   CREATININE mg/dL 0 61   ANION GAP mmol/L 10   CALCIUM mg/dL 8 3   ALBUMIN g/dL 2 7*   TOTAL BILIRUBIN mg/dL 0 30   ALK PHOS U/L 69   ALT U/L 22   AST U/L 15   GLUCOSE RANDOM mg/dL 101     Results from last 7 days   Lab Units 09/04/19  0857   INR  1 00         Results from last 7 days   Lab Units 09/05/19  0542   HEMOGLOBIN A1C % 6 0     Results from last 7 days   Lab Units 09/05/19  0542 09/04/19  0903   LACTIC ACID mmol/L 1 0 0 9   PROCALCITONIN ng/ml <0 05  --            * I Have Reviewed All Lab Data Listed Above  * Additional Pertinent Lab Tests Reviewed: All Labs Within Last 24 Hours Reviewed      Recent Cultures (last 7 days):           Last 24 Hours Medication List:     Current Facility-Administered Medications:  acetaminophen 975 mg Oral Q6H PRN Danyelle De Dios PA-C    [START ON 9/7/2019] atenolol 50 mg Oral Daily Danyelle De Dios PA-C    enoxaparin 40 mg Subcutaneous Q12H [address] Robin Waterman,     hydrALAZINE 10 mg Intravenous Q4H PRN Danyelle De Dios PA-C    lisinopril 20 mg Oral Daily Fallon Goltz, DO    methadone 110 mg Oral Daily Danyelle De Dios PA-C    metoclopramide 10 mg Oral TID PRN Orlando De Dios PA-C    nicotine 1 patch Transdermal Daily Fallon Goltz, DO    pantoprazole 40 mg Oral Early Morning Coby Jones MD    pregabalin 75 mg Oral BID Fallon Golmaggei, DO    scopolamine 1 patch Transdermal Q72H Sarah Spangler PA-C    sodium chloride 100 mL/hr Intravenous Continuous Danyelle De Dios PA-C Last Rate: 100 mL/hr (09/06/19 1145)        Today, Patient Was Seen By: Shaka Garcia PA-C    ** Please Note: Dictation voice to text software may have been used in the creation of this document   **

## 2019-09-07 LAB
ALBUMIN SERPL BCP-MCNC: 2.9 G/DL (ref 3.5–5)
ALP SERPL-CCNC: 77 U/L (ref 46–116)
ALT SERPL W P-5'-P-CCNC: 16 U/L (ref 12–78)
ANION GAP SERPL CALCULATED.3IONS-SCNC: 11 MMOL/L (ref 4–13)
AST SERPL W P-5'-P-CCNC: 22 U/L (ref 5–45)
BASOPHILS # BLD AUTO: 0.01 THOUSANDS/ΜL (ref 0–0.1)
BASOPHILS NFR BLD AUTO: 0 % (ref 0–1)
BILIRUB SERPL-MCNC: 0.6 MG/DL (ref 0.2–1)
BUN SERPL-MCNC: 5 MG/DL (ref 5–25)
CALCIUM SERPL-MCNC: 8.3 MG/DL (ref 8.3–10.1)
CHLORIDE SERPL-SCNC: 102 MMOL/L (ref 100–108)
CO2 SERPL-SCNC: 23 MMOL/L (ref 21–32)
CREAT SERPL-MCNC: 0.55 MG/DL (ref 0.6–1.3)
EOSINOPHIL # BLD AUTO: 0.3 THOUSAND/ΜL (ref 0–0.61)
EOSINOPHIL NFR BLD AUTO: 3 % (ref 0–6)
ERYTHROCYTE [DISTWIDTH] IN BLOOD BY AUTOMATED COUNT: 15.4 % (ref 11.6–15.1)
GFR SERPL CREATININE-BSD FRML MDRD: 129 ML/MIN/1.73SQ M
GLUCOSE SERPL-MCNC: 77 MG/DL (ref 65–140)
HCT VFR BLD AUTO: 39.1 % (ref 36.5–49.3)
HGB BLD-MCNC: 12.6 G/DL (ref 12–17)
IMM GRANULOCYTES # BLD AUTO: 0.03 THOUSAND/UL (ref 0–0.2)
IMM GRANULOCYTES NFR BLD AUTO: 0 % (ref 0–2)
LIPASE SERPL-CCNC: 506 U/L (ref 73–393)
LYMPHOCYTES # BLD AUTO: 1.99 THOUSANDS/ΜL (ref 0.6–4.47)
LYMPHOCYTES NFR BLD AUTO: 18 % (ref 14–44)
MCH RBC QN AUTO: 26.6 PG (ref 26.8–34.3)
MCHC RBC AUTO-ENTMCNC: 32.2 G/DL (ref 31.4–37.4)
MCV RBC AUTO: 83 FL (ref 82–98)
MONOCYTES # BLD AUTO: 0.5 THOUSAND/ΜL (ref 0.17–1.22)
MONOCYTES NFR BLD AUTO: 5 % (ref 4–12)
NEUTROPHILS # BLD AUTO: 8.24 THOUSANDS/ΜL (ref 1.85–7.62)
NEUTS SEG NFR BLD AUTO: 74 % (ref 43–75)
NRBC BLD AUTO-RTO: 0 /100 WBCS
PLATELET # BLD AUTO: 107 THOUSANDS/UL (ref 149–390)
PMV BLD AUTO: 10.5 FL (ref 8.9–12.7)
POTASSIUM SERPL-SCNC: 3.8 MMOL/L (ref 3.5–5.3)
PROT SERPL-MCNC: 6.8 G/DL (ref 6.4–8.2)
RBC # BLD AUTO: 4.74 MILLION/UL (ref 3.88–5.62)
SODIUM SERPL-SCNC: 136 MMOL/L (ref 136–145)
WBC # BLD AUTO: 11.07 THOUSAND/UL (ref 4.31–10.16)

## 2019-09-07 PROCEDURE — 99232 SBSQ HOSP IP/OBS MODERATE 35: CPT | Performed by: FAMILY MEDICINE

## 2019-09-07 PROCEDURE — 80053 COMPREHEN METABOLIC PANEL: CPT | Performed by: PHYSICIAN ASSISTANT

## 2019-09-07 PROCEDURE — 83690 ASSAY OF LIPASE: CPT | Performed by: PHYSICIAN ASSISTANT

## 2019-09-07 PROCEDURE — 85025 COMPLETE CBC W/AUTO DIFF WBC: CPT | Performed by: PHYSICIAN ASSISTANT

## 2019-09-07 PROCEDURE — 93005 ELECTROCARDIOGRAM TRACING: CPT

## 2019-09-07 RX ADMIN — PREGABALIN 75 MG: 75 CAPSULE ORAL at 20:03

## 2019-09-07 RX ADMIN — METHADONE HYDROCHLORIDE 110 MG: 10 CONCENTRATE ORAL at 08:45

## 2019-09-07 RX ADMIN — METOCLOPRAMIDE HYDROCHLORIDE 10 MG: 10 TABLET ORAL at 16:26

## 2019-09-07 RX ADMIN — LISINOPRIL 20 MG: 20 TABLET ORAL at 08:46

## 2019-09-07 RX ADMIN — SODIUM CHLORIDE 100 ML/HR: 0.9 INJECTION, SOLUTION INTRAVENOUS at 16:21

## 2019-09-07 RX ADMIN — PANTOPRAZOLE SODIUM 40 MG: 40 TABLET, DELAYED RELEASE ORAL at 05:54

## 2019-09-07 RX ADMIN — PREGABALIN 75 MG: 75 CAPSULE ORAL at 08:46

## 2019-09-07 RX ADMIN — SODIUM CHLORIDE 100 ML/HR: 0.9 INJECTION, SOLUTION INTRAVENOUS at 05:54

## 2019-09-07 RX ADMIN — ATENOLOL 50 MG: 50 TABLET ORAL at 08:46

## 2019-09-07 NOTE — ASSESSMENT & PLAN NOTE
· Lifestyle modifications are recommended including weight loss, improving his diet, and increasing his amount of activity    · Discussed with Gastroenterology  · Outpatient surveillance imaging  · Check an AFP-tumor marker  · Normal liver function tests

## 2019-09-07 NOTE — PLAN OF CARE
Problem: Potential for Falls  Goal: Patient will remain free of falls  Description  INTERVENTIONS:  - Assess patient frequently for physical needs  -  Identify cognitive and physical deficits and behaviors that affect risk of falls    -  [place] fall precautions as indicated by assessment   - Educate patient/family on patient safety including physical limitations  - Instruct patient to call for assistance with activity based on assessment  - Modify environment to reduce risk of injury  - Consider OT/PT consult to assist with strengthening/mobility  Outcome: Progressing     Problem: PAIN - ADULT  Goal: Verbalizes/displays adequate comfort level or baseline comfort level  Description  Interventions:  - Encourage patient to monitor pain and request assistance  - Assess pain using appropriate pain scale  - Administer analgesics based on type and severity of pain and evaluate response  - Implement non-pharmacological measures as appropriate and evaluate response  - Consider cultural and social influences on pain and pain management  - Notify physician/advanced practitioner if interventions unsuccessful or patient reports new pain  Outcome: Progressing     Problem: SAFETY ADULT  Goal: Maintain or return to baseline ADL function  Description  INTERVENTIONS:  -  Assess patient's ability to carry out ADLs; assess patient's baseline for ADL function and identify physical deficits which impact ability to perform ADLs (bathing, care of mouth/teeth, toileting, grooming, dressing, etc )  - Assess/evaluate cause of self-care deficits   - Assess range of motion  - Assess patient's mobility; develop plan if impaired  - Assess patient's need for assistive devices and provide as appropriate  - Encourage maximum independence but intervene and supervise when necessary  - Involve family in performance of ADLs  - Assess for home care needs following discharge   - Consider OT consult to assist with ADL evaluation and planning for discharge  - Provide patient education as appropriate  Outcome: Progressing  Goal: Maintain or return mobility status to optimal level  Description  INTERVENTIONS:  - Assess patient's baseline mobility status (ambulation, transfers, stairs, etc )    - Identify cognitive and physical deficits and behaviors that affect mobility  - Identify mobility aids required to assist with transfers and/or ambulation (gait belt, sit-to-stand, lift, walker, cane, etc )  - [place] fall precautions as indicated by assessment  - Record patient progress and toleration of activity level on Mobility SBAR; progress patient to next Phase/Stage  - Instruct patient to call for assistance with activity based on assessment  - Consider rehabilitation consult to assist with strengthening/weightbearing, etc   Outcome: Progressing     Problem: DISCHARGE PLANNING  Goal: Discharge to home or other facility with appropriate resources  Description  INTERVENTIONS:  - Identify barriers to discharge w/patient and caregiver  - Arrange for needed discharge resources and transportation as appropriate  - Identify discharge learning needs (meds, wound care, etc )  - Arrange for interpretive services to assist at discharge as needed  - Refer to Case Management Department for coordinating discharge planning if the patient needs post-hospital services based on physician/advanced practitioner order or complex needs related to functional status, cognitive ability, or social support system  Outcome: Progressing     Problem: Knowledge Deficit  Goal: Patient/family/caregiver demonstrates understanding of disease process, treatment plan, medications, and discharge instructions  Description  Complete learning assessment and assess knowledge base    Interventions:  - Provide teaching at level of understanding  - Provide teaching via preferred learning methods  Outcome: Progressing     Problem: GASTROINTESTINAL - ADULT  Goal: Minimal or absence of nausea and/or vomiting  Description  INTERVENTIONS:  - Administer IV fluids if ordered to ensure adequate hydration  - Maintain NPO status until nausea and vomiting are resolved  - Nasogastric tube if ordered  - Administer ordered antiemetic medications as needed  - Provide nonpharmacologic comfort measures as appropriate  - Advance diet as tolerated, if ordered  - Consider nutrition services referral to assist patient with adequate nutrition and appropriate food choices  Outcome: Progressing  Goal: Maintains or returns to baseline bowel function  Description  INTERVENTIONS:  - Assess bowel function  - Encourage oral fluids to ensure adequate hydration  - Administer IV fluids if ordered to ensure adequate hydration  - Administer ordered medications as needed  - Encourage mobilization and activity  - Consider nutritional services referral to assist patient with adequate nutrition and appropriate food choices  Outcome: Progressing  Goal: Maintains adequate nutritional intake  Description  INTERVENTIONS:  - Monitor percentage of each meal consumed  - Identify factors contributing to decreased intake, treat as appropriate  - Assist with meals as needed  - Monitor I&O, weight, and lab values if indicated  - Obtain nutrition services referral as needed  Outcome: Progressing  Goal: Establish and maintain optimal ostomy function  Description  INTERVENTIONS:  - Assess bowel function  - Encourage oral fluids to ensure adequate hydration  - Administer IV fluids if ordered to ensure adequate hydration   - Administer ordered medications as needed  - Encourage mobilization and activity  - Nutrition services referral to assist patient with appropriate food choices  - Assess stoma site  - Consider wound care consult   Outcome: Progressing     Problem: METABOLIC, FLUID AND ELECTROLYTES - ADULT  Goal: Electrolytes maintained within normal limits  Description  INTERVENTIONS:  - Monitor labs and assess patient for signs and symptoms of electrolyte imbalances  - Administer electrolyte replacement as ordered  - Monitor response to electrolyte replacements, including repeat lab results as appropriate  - Instruct patient on fluid and nutrition as appropriate  Outcome: Progressing  Goal: Fluid balance maintained  Description  INTERVENTIONS:  - Monitor labs   - Monitor I/O and WT  - Instruct patient on fluid and nutrition as appropriate  - Assess for signs & symptoms of volume excess or deficit  Outcome: Progressing     Problem: SKIN/TISSUE INTEGRITY - ADULT  Goal: Skin integrity remains intact  Description  INTERVENTIONS  - Identify patients at risk for skin breakdown  - Assess and monitor skin integrity  - Assess and monitor nutrition and hydration status  - Monitor labs (i e  albumin)  - Assess for incontinence   - Turn and reposition patient  - Assist with mobility/ambulation  - Relieve pressure over bony prominences  - Avoid friction and shearing  - Provide appropriate hygiene as needed including keeping skin clean and dry  - Evaluate need for skin moisturizer/barrier cream  - Collaborate with interdisciplinary team (i e  Nutrition, Rehabilitation, etc )   - Patient/family teaching  Outcome: Progressing  Goal: Oral mucous membranes remain intact  Description  INTERVENTIONS  - Assess oral mucosa and hygiene practices  - Implement preventative oral hygiene regimen  - Implement oral medicated treatments as ordered  - Initiate Nutrition services referral as needed  Outcome: Progressing     Problem: Nutrition/Hydration-ADULT  Goal: Nutrient/Hydration intake appropriate for improving, restoring or maintaining nutritional needs  Description  Monitor and assess patient's nutrition/hydration status for malnutrition  Collaborate with interdisciplinary team and initiate plan and interventions as ordered  Monitor patient's weight and dietary intake as ordered or per policy  Utilize nutrition screening tool and intervene as necessary   Determine patient's food preferences and provide high-protein, high-caloric foods as appropriate       INTERVENTIONS:  - Monitor oral intake, urinary output, labs, and treatment plans  - Assess nutrition and hydration status and recommend course of action  - Evaluate amount of meals eaten  - Assist patient with eating if necessary   - Allow adequate time for meals  - Recommend/ encourage appropriate diets, oral nutritional supplements, and vitamin/mineral supplements  - Order, calculate, and assess calorie counts as needed  - Recommend, monitor, and adjust tube feedings and TPN/PPN based on assessed needs  - Assess need for intravenous fluids  - Provide specific nutrition/hydration education as appropriate  - Include patient/family/caregiver in decisions related to nutrition  Outcome: Progressing

## 2019-09-07 NOTE — ASSESSMENT & PLAN NOTE
· Elevated Lipase, not full pancreatitis  Lipase continues to increase  · Discussed with GI and they recommend to continue supportive treatment and advance diet  · Advance diet as tolerated, will attempt regular diet today and reassess tomorrow  · NSS IV fluids - decreased rate to 100 cc/hour due to hypertension  · Pain control  · Scopolamine patch   P r n  Reglan  QT prolongation on EKG  · Possible pancreatic mass  · Noted MRCP/MRI results, see below  · Check a CA 19-9 level  · The patient denies any alcohol use  · Check a fasting lipid profile to evaluate the patient for hypertriglyceridemia  · Appreciate Gastroenterology  · Gastric emptying study on 09/06 not attempted due to to vomiting

## 2019-09-07 NOTE — ASSESSMENT & PLAN NOTE
· Lifestyle modifications are recommended including weight loss, improving his diet, and increasing his amount of activity    · Consult Gastroenterology  · Outpatient surveillance imaging  · Normal liver function tests

## 2019-09-07 NOTE — PROGRESS NOTES
Progress Note - Gavin Castellon 1980, 37 y o  male MRN: [id number]    Unit/Bed#: -01 Encounter: [id number]    Primary Care Provider: Sam Goodman DO   Date and time admitted to hospital: 9/4/2019  8:17 AM        Prolonged Q-T interval on ECG  Assessment & Plan  Scopolamine patch due to prolonged QT interval on EKG  Also p r n  Reglan  Repeat EKG    Hypertension  Assessment & Plan  · Improved control on the increased dose to 50 mg daily  · P r n  Hydralazine for BP spikes  · Pain control (patient states tolerates NSAIDs however anaphylactic reaction to Toradol); Tylenol, home dose of methadone  Opiate dependence, continuous (HCC)  Assessment & Plan  · On chronic methadone treatment for chronic neck/back pain  · Avoid narcotic medications      Gastritis  Assessment & Plan  · Maintained on Protonix  · Appreciate Gastroenterology    Pancreatic mass  Assessment & Plan  · Possible pancreatic mass  · MRCP/MRI of the pancreas showed hepatic  Steatosis, normal CBD, however multiple cystic lesions - one of pancreas, another of stomach  · Check a CA 19-9 level  · Appreciate Gastroenterology consult   · Can do EGD/EUS outpatient    Hepatic steatosis  Assessment & Plan  · Lifestyle modifications are recommended including weight loss, improving his diet, and increasing his amount of activity  · Consult Gastroenterology  · Outpatient surveillance imaging  · Normal liver function tests    Hepatomegaly  Assessment & Plan  · Lifestyle modifications are recommended including weight loss, improving his diet, and increasing his amount of activity  · Discussed with Gastroenterology  · Outpatient surveillance imaging  · Check an AFP-tumor marker  · Normal liver function tests    * Elevated Lipase  Assessment & Plan  · Elevated Lipase, not full pancreatitis  Lipase continues to increase  · Discussed with GI and they recommend to continue supportive treatment and advance diet    · Advance diet as tolerated, will attempt regular diet today and reassess tomorrow  · NSS IV fluids - decreased rate to 100 cc/hour due to hypertension  · Pain control  · Scopolamine patch   P r n  Reglan  QT prolongation on EKG  · Possible pancreatic mass  · Noted MRCP/MRI results, see below  · Check a CA 19-9 level  · The patient denies any alcohol use  · Check a fasting lipid profile to evaluate the patient for hypertriglyceridemia  · Appreciate Gastroenterology  · Gastric emptying study on  not attempted due to to vomiting  VTE Pharmacologic Prophylaxis:   Pharmacologic: Enoxaparin (Lovenox)  Mechanical VTE Prophylaxis in Place: Yes    Patient Centered Rounds: I have performed bedside rounds with nursing staff today  Discussions with Specialists or Other Care Team Provider:  GI    Education and Discussions with Family / Patient:  Patient's wife    Time Spent for Care: 20 minutes  More than 50% of total time spent on counseling and coordination of care as described above  Current Length of Stay: 3 day(s)    Current Patient Status: Inpatient   Certification Statement: The patient will continue to require additional inpatient hospital stay due to Pancreatitis    Discharge Plan:  24 hours    Code Status: Level 1 - Full Code      Subjective:   Patient was seen and examined  Patient is in the room with his wife  He reports some abdominal discomfort and states that he is trying to eat hard candies and has been taking full liquid diet, but does not have good appetite  Objective:     Vitals:   Temp (24hrs), Av 4 °F (36 9 °C), Min:98 3 °F (36 8 °C), Max:98 4 °F (36 9 °C)    Temp:  [98 3 °F (36 8 °C)-98 4 °F (36 9 °C)] 98 4 °F (36 9 °C)  HR:  [65-72] 69  Resp:  [18-19] 18  BP: (139-174)/(73-96) 147/88  SpO2:  [96 %-98 %] 96 %  Body mass index is 40 81 kg/m²  Input and Output Summary (last 24 hours):        Intake/Output Summary (Last 24 hours) at 2019 1346  Last data filed at 2019 0845  Gross per 24 hour   Intake 180 ml   Output 800 ml   Net -620 ml       Physical Exam:     Physical Exam   Constitutional: He appears well-developed and well-nourished  Cardiovascular: Normal rate, regular rhythm and normal heart sounds  Pulmonary/Chest: Effort normal and breath sounds normal  No respiratory distress  Abdominal: Soft  Bowel sounds are normal  There is no tenderness  Musculoskeletal: He exhibits no edema or deformity  Neurological: He is alert  Skin: Skin is warm  Capillary refill takes less than 2 seconds  No rash noted  No erythema  Psychiatric: He has a normal mood and affect  Additional Data:     Labs:    Results from last 7 days   Lab Units 09/07/19  0608   WBC Thousand/uL 11 07*   HEMOGLOBIN g/dL 12 6   HEMATOCRIT % 39 1   PLATELETS Thousands/uL 107*   NEUTROS PCT % 74   LYMPHS PCT % 18   MONOS PCT % 5   EOS PCT % 3     Results from last 7 days   Lab Units 09/07/19  0608   SODIUM mmol/L 136   POTASSIUM mmol/L 3 8   CHLORIDE mmol/L 102   CO2 mmol/L 23   BUN mg/dL 5   CREATININE mg/dL 0 55*   ANION GAP mmol/L 11   CALCIUM mg/dL 8 3   ALBUMIN g/dL 2 9*   TOTAL BILIRUBIN mg/dL 0 60   ALK PHOS U/L 77   ALT U/L 16   AST U/L 22   GLUCOSE RANDOM mg/dL 77     Results from last 7 days   Lab Units 09/04/19  0857   INR  1 00         Results from last 7 days   Lab Units 09/05/19  0542   HEMOGLOBIN A1C % 6 0     Results from last 7 days   Lab Units 09/05/19  0542 09/04/19  0903   LACTIC ACID mmol/L 1 0 0 9   PROCALCITONIN ng/ml <0 05  --            * I Have Reviewed All Lab Data Listed Above  * Additional Pertinent Lab Tests Reviewed:  All Labs Within Last 24 Hours Reviewed    Imaging:    MRI of the abdomen    Recent Cultures (last 7 days):           Last 24 Hours Medication List:     Current Facility-Administered Medications:  acetaminophen 975 mg Oral Q6H PRN Danyelle De Dios PA-C    atenolol 50 mg Oral Daily Danyelle De Dios PA-C    enoxaparin 40 mg Subcutaneous Q12H [facility] Robin Waterman DO    hydrALAZINE 10 mg Intravenous Q4H PRN Danyelle De Dios PA-C    lisinopril 20 mg Oral Daily [employer], DO    methadone 110 mg Oral Daily Danyelle De Dios PA-C    metoclopramide 10 mg Oral TID PRN Mg De Dios PA-C    nicotine 1 patch Transdermal Daily [employer], DO    pantoprazole 40 mg Oral Early Morning Janet Moreno MD    pregabalin 75 mg Oral BID [employer], DO    scopolamine 1 patch Transdermal Q72H Sarah Spangler PA-C    sodium chloride 100 mL/hr Intravenous Continuous Danyelle De Dios PA-C Last Rate: 100 mL/hr (09/07/19 0554)        Today, Patient Was Seen By: Janet Moreno MD    ** Please Note: Dictation voice to text software may have been used in the creation of this document   **

## 2019-09-07 NOTE — ASSESSMENT & PLAN NOTE
· Improved control on the increased dose to 50 mg daily  · P r n  Hydralazine for BP spikes  · Pain control (patient states tolerates NSAIDs however anaphylactic reaction to Toradol); Tylenol, home dose of methadone

## 2019-09-08 VITALS
HEIGHT: 70 IN | OXYGEN SATURATION: 96 % | DIASTOLIC BLOOD PRESSURE: 89 MMHG | RESPIRATION RATE: 18 BRPM | BODY MASS INDEX: 40.71 KG/M2 | TEMPERATURE: 98.7 F | HEART RATE: 62 BPM | SYSTOLIC BLOOD PRESSURE: 163 MMHG | WEIGHT: 284.39 LBS

## 2019-09-08 LAB
BASOPHILS # BLD AUTO: 0.01 THOUSANDS/ΜL (ref 0–0.1)
BASOPHILS NFR BLD AUTO: 0 % (ref 0–1)
EOSINOPHIL # BLD AUTO: 0.41 THOUSAND/ΜL (ref 0–0.61)
EOSINOPHIL NFR BLD AUTO: 3 % (ref 0–6)
ERYTHROCYTE [DISTWIDTH] IN BLOOD BY AUTOMATED COUNT: 15.6 % (ref 11.6–15.1)
HCT VFR BLD AUTO: 41.7 % (ref 36.5–49.3)
HGB BLD-MCNC: 12.9 G/DL (ref 12–17)
IMM GRANULOCYTES # BLD AUTO: 0.07 THOUSAND/UL (ref 0–0.2)
IMM GRANULOCYTES NFR BLD AUTO: 1 % (ref 0–2)
LIPASE SERPL-CCNC: 470 U/L (ref 73–393)
LYMPHOCYTES # BLD AUTO: 2.21 THOUSANDS/ΜL (ref 0.6–4.47)
LYMPHOCYTES NFR BLD AUTO: 18 % (ref 14–44)
MCH RBC QN AUTO: 26.6 PG (ref 26.8–34.3)
MCHC RBC AUTO-ENTMCNC: 30.9 G/DL (ref 31.4–37.4)
MCV RBC AUTO: 86 FL (ref 82–98)
MONOCYTES # BLD AUTO: 0.63 THOUSAND/ΜL (ref 0.17–1.22)
MONOCYTES NFR BLD AUTO: 5 % (ref 4–12)
NEUTROPHILS # BLD AUTO: 8.71 THOUSANDS/ΜL (ref 1.85–7.62)
NEUTS SEG NFR BLD AUTO: 73 % (ref 43–75)
NRBC BLD AUTO-RTO: 0 /100 WBCS
PLATELET # BLD AUTO: 208 THOUSANDS/UL (ref 149–390)
PMV BLD AUTO: 10.7 FL (ref 8.9–12.7)
RBC # BLD AUTO: 4.85 MILLION/UL (ref 3.88–5.62)
WBC # BLD AUTO: 12.04 THOUSAND/UL (ref 4.31–10.16)

## 2019-09-08 PROCEDURE — 85025 COMPLETE CBC W/AUTO DIFF WBC: CPT | Performed by: FAMILY MEDICINE

## 2019-09-08 PROCEDURE — 83690 ASSAY OF LIPASE: CPT | Performed by: FAMILY MEDICINE

## 2019-09-08 PROCEDURE — 99239 HOSP IP/OBS DSCHRG MGMT >30: CPT | Performed by: FAMILY MEDICINE

## 2019-09-08 RX ORDER — METOCLOPRAMIDE 5 MG/1
5 TABLET ORAL 4 TIMES DAILY PRN
Qty: 30 TABLET | Refills: 0 | Status: ON HOLD | OUTPATIENT
Start: 2019-09-08 | End: 2019-09-27 | Stop reason: SDUPTHER

## 2019-09-08 RX ADMIN — LISINOPRIL 20 MG: 20 TABLET ORAL at 08:48

## 2019-09-08 RX ADMIN — ATENOLOL 50 MG: 50 TABLET ORAL at 08:47

## 2019-09-08 RX ADMIN — SODIUM CHLORIDE 100 ML/HR: 0.9 INJECTION, SOLUTION INTRAVENOUS at 01:37

## 2019-09-08 RX ADMIN — PREGABALIN 75 MG: 75 CAPSULE ORAL at 08:48

## 2019-09-08 RX ADMIN — METHADONE HYDROCHLORIDE 110 MG: 10 CONCENTRATE ORAL at 08:48

## 2019-09-08 RX ADMIN — PANTOPRAZOLE SODIUM 40 MG: 40 TABLET, DELAYED RELEASE ORAL at 05:40

## 2019-09-08 NOTE — PLAN OF CARE
Problem: Potential for Falls  Goal: Patient will remain free of falls  Description  INTERVENTIONS:  - Assess patient frequently for physical needs  -  Identify cognitive and physical deficits and behaviors that affect risk of falls    -  [place] fall precautions as indicated by assessment   - Educate patient/family on patient safety including physical limitations  - Instruct patient to call for assistance with activity based on assessment  - Modify environment to reduce risk of injury  - Consider OT/PT consult to assist with strengthening/mobility  Outcome: Progressing     Problem: PAIN - ADULT  Goal: Verbalizes/displays adequate comfort level or baseline comfort level  Description  Interventions:  - Encourage patient to monitor pain and request assistance  - Assess pain using appropriate pain scale  - Administer analgesics based on type and severity of pain and evaluate response  - Implement non-pharmacological measures as appropriate and evaluate response  - Consider cultural and social influences on pain and pain management  - Notify physician/advanced practitioner if interventions unsuccessful or patient reports new pain  Outcome: Progressing     Problem: SAFETY ADULT  Goal: Maintain or return to baseline ADL function  Description  INTERVENTIONS:  -  Assess patient's ability to carry out ADLs; assess patient's baseline for ADL function and identify physical deficits which impact ability to perform ADLs (bathing, care of mouth/teeth, toileting, grooming, dressing, etc )  - Assess/evaluate cause of self-care deficits   - Assess range of motion  - Assess patient's mobility; develop plan if impaired  - Assess patient's need for assistive devices and provide as appropriate  - Encourage maximum independence but intervene and supervise when necessary  - Involve family in performance of ADLs  - Assess for home care needs following discharge   - Consider OT consult to assist with ADL evaluation and planning for discharge  - Provide patient education as appropriate  Outcome: Progressing  Goal: Maintain or return mobility status to optimal level  Description  INTERVENTIONS:  - Assess patient's baseline mobility status (ambulation, transfers, stairs, etc )    - Identify cognitive and physical deficits and behaviors that affect mobility  - Identify mobility aids required to assist with transfers and/or ambulation (gait belt, sit-to-stand, lift, walker, cane, etc )  - [place] fall precautions as indicated by assessment  - Record patient progress and toleration of activity level on Mobility SBAR; progress patient to next Phase/Stage  - Instruct patient to call for assistance with activity based on assessment  - Consider rehabilitation consult to assist with strengthening/weightbearing, etc   Outcome: Progressing     Problem: DISCHARGE PLANNING  Goal: Discharge to home or other facility with appropriate resources  Description  INTERVENTIONS:  - Identify barriers to discharge w/patient and caregiver  - Arrange for needed discharge resources and transportation as appropriate  - Identify discharge learning needs (meds, wound care, etc )  - Arrange for interpretive services to assist at discharge as needed  - Refer to Case Management Department for coordinating discharge planning if the patient needs post-hospital services based on physician/advanced practitioner order or complex needs related to functional status, cognitive ability, or social support system  Outcome: Progressing     Problem: Knowledge Deficit  Goal: Patient/family/caregiver demonstrates understanding of disease process, treatment plan, medications, and discharge instructions  Description  Complete learning assessment and assess knowledge base    Interventions:  - Provide teaching at level of understanding  - Provide teaching via preferred learning methods  Outcome: Progressing     Problem: GASTROINTESTINAL - ADULT  Goal: Minimal or absence of nausea and/or vomiting  Description  INTERVENTIONS:  - Administer IV fluids if ordered to ensure adequate hydration  - Maintain NPO status until nausea and vomiting are resolved  - Nasogastric tube if ordered  - Administer ordered antiemetic medications as needed  - Provide nonpharmacologic comfort measures as appropriate  - Advance diet as tolerated, if ordered  - Consider nutrition services referral to assist patient with adequate nutrition and appropriate food choices  Outcome: Progressing  Goal: Maintains or returns to baseline bowel function  Description  INTERVENTIONS:  - Assess bowel function  - Encourage oral fluids to ensure adequate hydration  - Administer IV fluids if ordered to ensure adequate hydration  - Administer ordered medications as needed  - Encourage mobilization and activity  - Consider nutritional services referral to assist patient with adequate nutrition and appropriate food choices  Outcome: Progressing  Goal: Maintains adequate nutritional intake  Description  INTERVENTIONS:  - Monitor percentage of each meal consumed  - Identify factors contributing to decreased intake, treat as appropriate  - Assist with meals as needed  - Monitor I&O, weight, and lab values if indicated  - Obtain nutrition services referral as needed  Outcome: Progressing  Goal: Establish and maintain optimal ostomy function  Description  INTERVENTIONS:  - Assess bowel function  - Encourage oral fluids to ensure adequate hydration  - Administer IV fluids if ordered to ensure adequate hydration   - Administer ordered medications as needed  - Encourage mobilization and activity  - Nutrition services referral to assist patient with appropriate food choices  - Assess stoma site  - Consider wound care consult   Outcome: Progressing     Problem: METABOLIC, FLUID AND ELECTROLYTES - ADULT  Goal: Electrolytes maintained within normal limits  Description  INTERVENTIONS:  - Monitor labs and assess patient for signs and symptoms of electrolyte imbalances  - Administer electrolyte replacement as ordered  - Monitor response to electrolyte replacements, including repeat lab results as appropriate  - Instruct patient on fluid and nutrition as appropriate  Outcome: Progressing  Goal: Fluid balance maintained  Description  INTERVENTIONS:  - Monitor labs   - Monitor I/O and WT  - Instruct patient on fluid and nutrition as appropriate  - Assess for signs & symptoms of volume excess or deficit  Outcome: Progressing     Problem: SKIN/TISSUE INTEGRITY - ADULT  Goal: Skin integrity remains intact  Description  INTERVENTIONS  - Identify patients at risk for skin breakdown  - Assess and monitor skin integrity  - Assess and monitor nutrition and hydration status  - Monitor labs (i e  albumin)  - Assess for incontinence   - Turn and reposition patient  - Assist with mobility/ambulation  - Relieve pressure over bony prominences  - Avoid friction and shearing  - Provide appropriate hygiene as needed including keeping skin clean and dry  - Evaluate need for skin moisturizer/barrier cream  - Collaborate with interdisciplinary team (i e  Nutrition, Rehabilitation, etc )   - Patient/family teaching  Outcome: Progressing  Goal: Oral mucous membranes remain intact  Description  INTERVENTIONS  - Assess oral mucosa and hygiene practices  - Implement preventative oral hygiene regimen  - Implement oral medicated treatments as ordered  - Initiate Nutrition services referral as needed  Outcome: Progressing     Problem: Nutrition/Hydration-ADULT  Goal: Nutrient/Hydration intake appropriate for improving, restoring or maintaining nutritional needs  Description  Monitor and assess patient's nutrition/hydration status for malnutrition  Collaborate with interdisciplinary team and initiate plan and interventions as ordered  Monitor patient's weight and dietary intake as ordered or per policy  Utilize nutrition screening tool and intervene as necessary   Determine patient's food preferences and provide high-protein, high-caloric foods as appropriate       INTERVENTIONS:  - Monitor oral intake, urinary output, labs, and treatment plans  - Assess nutrition and hydration status and recommend course of action  - Evaluate amount of meals eaten  - Assist patient with eating if necessary   - Allow adequate time for meals  - Recommend/ encourage appropriate diets, oral nutritional supplements, and vitamin/mineral supplements  - Order, calculate, and assess calorie counts as needed  - Recommend, monitor, and adjust tube feedings and TPN/PPN based on assessed needs  - Assess need for intravenous fluids  - Provide specific nutrition/hydration education as appropriate  - Include patient/family/caregiver in decisions related to nutrition  Outcome: Progressing

## 2019-09-08 NOTE — ASSESSMENT & PLAN NOTE
· Elevated Lipase, not full pancreatitis  Lipase is improved today  · Discussed with GI and they recommend to continue supportive treatment and advance diet  · Tolerated diet  · Possible pancreatic mass  · Noted MRCP/MRI results, see below  · Check a CA 19-9 level  · The patient denies any alcohol use  · Check a fasting lipid profile to evaluate the patient for hypertriglyceridemia  · Appreciate Gastroenterology  · Gastric emptying study on 09/06 not attempted due to to vomiting

## 2019-09-08 NOTE — ASSESSMENT & PLAN NOTE
· Lifestyle modifications are recommended including weight loss, improving his diet, and increasing his amount of activity    · Discussed with Gastroenterology  · Outpatient surveillance imaging  · Normal liver function tests

## 2019-09-08 NOTE — DISCHARGE SUMMARY
Discharge- Johnny Mcdaniel 1980, 37 y o  male MRN: [id number]    Unit/Bed#: -01 Encounter: [id number]    Primary Care Provider: Denise Bahena DO   Date and time admitted to hospital: 9/4/2019  8:17 AM        Spleen anomaly  Assessment & Plan  · 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 thrombosis  · Can check an ultrasound of the spleen for further evaluation    Gastritis  Assessment & Plan  · Maintained on Protonix  · Appreciate Gastroenterology    Pancreatic mass  Assessment & Plan  · Possible pancreatic mass  · MRCP/MRI of the pancreas showed hepatic  Steatosis, normal CBD, however multiple cystic lesions - one of pancreas, another of stomach  · Check a CA 19-9 level  · Appreciate Gastroenterology consult   ·  EGD/EUS outpatient    Morbid obesity with BMI of 40 0-44 9, adult ([facility] [address] )  Assessment & Plan  · Lifestyle modifications are recommended including weight loss, improving his diet, and increasing his amount of activity  · Check an outpatient sleep study to evaluate the patient for obstructive sleep apnea      Hepatic steatosis  Assessment & Plan  · Lifestyle modifications are recommended including weight loss, improving his diet, and increasing his amount of activity  · Consult Gastroenterology  · Outpatient surveillance imaging  · Normal liver function tests    Hepatomegaly  Assessment & Plan  · Lifestyle modifications are recommended including weight loss, improving his diet, and increasing his amount of activity  · Discussed with Gastroenterology  · Outpatient surveillance imaging  · Normal liver function tests    * Elevated Lipase  Assessment & Plan  · Elevated Lipase, not full pancreatitis  Lipase is improved today  · Discussed with GI and they recommend to continue supportive treatment and advance diet    · Tolerated diet  · Possible pancreatic mass  · Noted MRCP/MRI results, see below  · Check a CA 19-9 level  · The patient denies any alcohol use  · Check a fasting lipid profile to evaluate the patient for hypertriglyceridemia  · Appreciate Gastroenterology  · Gastric emptying study on 09/06 not attempted due to to vomiting  Discharge Summary - [facility]    Patient Information: Ksenia Rubio 37 y o  male MRN: [id number]  Unit/Bed#: -[facility] Encounter: [id number]    Discharging Physician / Practitioner: Ken Gustafson MD  PCP: Bree Moya DO  Admission Date: 9/4/2019  Discharge Date: 09/08/19    Reason for Admission:  Abdominal pain    Discharge Diagnoses:     Principal Problem:    Elevated Lipase  Active Problems:    Hepatomegaly    Hepatic steatosis    Morbid obesity with BMI of 40 0-44 9, adult ([address] )    Pancreatic mass    Gastritis    Tobacco use    Opiate dependence, continuous (HCC)    Spleen anomaly    Hypertension    Prolonged Q-T interval on ECG  Resolved Problems:    * No resolved hospital problems  *      Consultations During Hospital Stay:  · GI    Procedures Performed:     · Attempted gastric emptying study    Significant Findings / Test Results:     MRI of the abdomen: There is a cystic lesion at the surface of the body of the pancreas with the surrounding infiltration which extends to the greater curvature corresponding to the abnormality described on the CT, measuring 1 3 x 1 3 cm  This appears to extends into the   pancreas with a narrow pedicle as seen in image 28 series 5    This is of indeterminate nature, this may be sequela of prior bout of pancreatitis, neoplasm less likely      There are additional cystic lesions in the submucosal/ intramural location in the stomach, these are indeterminate differential include infiltrating peripancreatic pseudocyst, cystic submucosal intrinsic stomach lesion or these may be sequela of  recent  endoscopy performed at [facility] performed on July 9, 2019    Incidental Findings:   · MRI of the abdomen:    Hepatic steatosis  Left adrenal cyst    Test Results Pending at Discharge (will require follow up): · None     Outpatient Tests Requested:  · Endoscopic ultrasound    Complications:  none    Hospital Course:     Burgess Viramontes is a 37 y o  male patient who originally presented to the hospital on 9/4/2019 due to intractable abdominal pain, nausea, and vomiting  Over the last 2 days, the patient has been experiencing an intermittent, severe abdominal pain located in the epigastric region  In addition, the patient has been experiencing multiple episodes of nausea and vomiting over the last 2 days  The patient has been experiencing difficulty with PO food or fluid intake secondary to the nausea vomiting  Nothing seemed to improve his symptoms  No chest pain  No shortness of breath  No fevers or chills  No diarrhea  Patient had CT scan of the abdomen done and it noted masslike density in the pancreatic body for which MRI was suggested  MRI of the abdomen continues to show suspicion for mass and multiple pancreatic cysts  As part of the treatment, patient has been seen by Gastroenterology  They recommended to have endoscopic ultrasound done on the outpatient basis  Patient lipase was mildly elevated and on the day of discharge it has decreased to 470  Patient was able to tolerate diet  Condition at Discharge: stable     Discharge Day Visit / Exam:     Subjective:  Patient seen and examined  He states that his abdominal pain is much better and he was able to eat regular food  He is anxious to go home  Vitals: Blood Pressure: 163/89 (09/08/19 1037)  Pulse: 62 (09/08/19 1037)  Temperature: 98 7 °F (37 1 °C) (09/08/19 0700)  Temp Source: Oral (09/08/19 0700)  Respirations: 18 (09/08/19 0700)  Height: 5' 10" (177 8 cm) (09/05/19 1201)  Weight - Scale: 129 kg (284 lb 6 3 oz) (09/05/19 1201)  SpO2: 96 % (09/08/19 0700)  Exam:   Physical Exam   Eyes: Pupils are equal, round, and reactive to light  Neck: Normal range of motion  Cardiovascular: Normal rate, regular rhythm and normal heart sounds  Pulmonary/Chest: Effort normal and breath sounds normal  No respiratory distress  Abdominal: Soft  Bowel sounds are normal  There is no tenderness  Musculoskeletal: He exhibits no edema or deformity  Neurological: He is alert  Skin: Skin is warm  Capillary refill takes less than 2 seconds  No rash noted  No erythema  Psychiatric: He has a normal mood and affect  Discussion with Family:     Discharge instructions/Information to patient and family:   See after visit summary for information provided to patient and family  Provisions for Follow-Up Care:  See after visit summary for information related to follow-up care and any pertinent home health orders  Disposition:     Home    For Discharges to   [address] [facility] SN[facility]:   · Not Applicable to this Patient - Not Applicable to this Patient    Planned Readmission: no     Discharge Statement:  I spent 45 minutes discharging the patient  This time was spent on the day of discharge  I had direct contact with the patient on the day of discharge  Greater than 50% of the total time was spent examining patient, answering all patient questions, arranging and discussing plan of care with patient as well as directly providing post-discharge instructions  Additional time then spent on discharge activities  Discharge Medications:  See after visit summary for reconciled discharge medications provided to patient and family        ** Please Note: This note has been constructed using a voice recognition system **

## 2019-09-08 NOTE — ASSESSMENT & PLAN NOTE
· Possible pancreatic mass  · MRCP/MRI of the pancreas showed hepatic  Steatosis, normal CBD, however multiple cystic lesions - one of pancreas, another of stomach  · Check a CA 19-9 level  · Appreciate Gastroenterology consult   ·  EGD/EUS outpatient

## 2019-09-09 NOTE — UTILIZATION REVIEW
Notification of Discharge  This is a Notification of Discharge from our facility [address]  Please be advised that this patient has been discharge from our facility  Below you will find the admission and discharge date and time including the patients disposition  PRESENTATION DATE: 9/4/2019  8:17 AM  OBS ADMISSION DATE:   IP ADMISSION DATE: 9/4/19 1154   DISCHARGE DATE: 9/8/2019 10:49 AM  DISPOSITION: Home/Self Care Home/Self Care   Admission Orders listed below:  Admission Orders (From admission, onward)     Ordered        09/04/19 1154  Inpatient Admission  Once                   Please contact the UR Department if additional information is required to close this patient's authorization/case  [facility] Department  Phone: [phone]; Fax [phone]  [email or web address]  ATTENTION: Please call with any questions or concerns to [phone]  and carefully listen to the prompts so that you are directed to the right person  Send all requests for admission clinical reviews, approved or denied determinations and any other requests to fax [phone]   All voicemails are confidential

## 2019-09-10 LAB
ATRIAL RATE: 60 BPM
ATRIAL RATE: 64 BPM
P AXIS: 1 DEGREES
P AXIS: 11 DEGREES
PR INTERVAL: 152 MS
PR INTERVAL: 152 MS
QRS AXIS: 1 DEGREES
QRS AXIS: 11 DEGREES
QRSD INTERVAL: 94 MS
QRSD INTERVAL: 96 MS
QT INTERVAL: 482 MS
QT INTERVAL: 494 MS
QTC INTERVAL: 494 MS
QTC INTERVAL: 497 MS
T WAVE AXIS: 11 DEGREES
T WAVE AXIS: 17 DEGREES
VENTRICULAR RATE: 60 BPM
VENTRICULAR RATE: 64 BPM

## 2019-09-10 PROCEDURE — 93010 ELECTROCARDIOGRAM REPORT: CPT | Performed by: INTERNAL MEDICINE

## 2019-09-19 ENCOUNTER — TELEPHONE (OUTPATIENT)
Dept: GASTROENTEROLOGY | Facility: CLINIC | Age: 39
End: 2019-09-19

## 2019-09-19 ENCOUNTER — OFFICE VISIT (OUTPATIENT)
Dept: GASTROENTEROLOGY | Facility: CLINIC | Age: 39
End: 2019-09-19
Payer: COMMERCIAL

## 2019-09-19 VITALS
HEIGHT: 68 IN | SYSTOLIC BLOOD PRESSURE: 118 MMHG | WEIGHT: 295.5 LBS | HEART RATE: 91 BPM | DIASTOLIC BLOOD PRESSURE: 82 MMHG | BODY MASS INDEX: 44.79 KG/M2

## 2019-09-19 DIAGNOSIS — K86.3 PANCREATIC PSEUDOCYST/CYST: ICD-10-CM

## 2019-09-19 DIAGNOSIS — K31.89 GASTRIC CYST: ICD-10-CM

## 2019-09-19 DIAGNOSIS — R11.0 NAUSEA: Primary | ICD-10-CM

## 2019-09-19 DIAGNOSIS — K86.2 PANCREATIC PSEUDOCYST/CYST: ICD-10-CM

## 2019-09-19 PROCEDURE — 99214 OFFICE O/P EST MOD 30 MIN: CPT | Performed by: PHYSICIAN ASSISTANT

## 2019-09-19 RX ORDER — PROMETHAZINE HYDROCHLORIDE 12.5 MG/1
12.5 TABLET ORAL EVERY 6 HOURS PRN
Qty: 60 TABLET | Refills: 1 | Status: SHIPPED | OUTPATIENT
Start: 2019-09-19 | End: 2019-10-08 | Stop reason: CLARIF

## 2019-09-19 NOTE — TELEPHONE ENCOUNTER
----- Message from Beau Kapoor PA-C sent at 9/19/2019 10:16 AM EDT -----  Can you have Elo Jolley call him again to schedule the EGD/EUS in the next 3-4 weeks? He didn't get her first message

## 2019-09-19 NOTE — PROGRESS NOTES
[facility] - Outpatient Follow-up Note  Chetna Flannery 37 y o  male MRN: [id number]  Encounter: [id number]          ASSESSMENT AND PLAN:      1  Nausea  - Suspect this could be 2/2 multiple cystic lesions noted on MRI/MRCP though they are not very large v gastroparesis from chronic methadone use v gastritis  - GES unable to be completed while hospitalized due to vomiting during the test  - EGD in July at [facility] showed gastritis and retained food in the stomach suggestive of gastroparesis  - Recommend to trial scheduled reglan in the AM and a second dose in the PM to see if this helps his symptoms but if not, will give script for phenergan to take PRN due to his zofran allergy, advised him to not take reglan and phenergan together    2  Pancreatic pseudocyst/cyst  3  Gastric cyst  - MRI/MRCP showed a cyst lesion at the surface of the body of the pancreas extending to the greater curvature of the stomach without associated PD dilatation and thought to be a possible pseudocyst; additional cystic lesions noted in the submucosal/intramural location in the stomach, additional cyst seen in the RUQ lateral to the CBD measuring 1 5 x 1 5 cm  - Plan for EGD/EUS With FNA of these cystic lesions/pancreas/CBD for further evaluation given abnormal MRI findings  - CA 19-9 normal  - Unclear if there could be a result of a prior pancreatitis episode but pt reports predominant symptoms is vomiting and this causes secondary abdominal pain    Follow up in 2 months or sooner pending EGD/EUS results  ______________________________________________________________________    SUBJECTIVE:  Mr Medora Brittle is a 38 yo F presenting for hospital follow up from earlier in September  He had a history of GERD, chronic methadone use, HTN, and CAD  He reported having issues with vomiting and abdominal pain for the past 6-7 months that was intermittent and exacerbated by food intake    He was hospitalized at [facility] in July for the symptoms and had an endoscopy on July 9th which showed retained food in the stomach as well as mucosal congestion in the antrum, diffuse gastritis, and mucosal changes in the duodenum  Pathology was not available  While at [facility] he had a CT scan of his abdomen and pelvis done which showed hepatomegaly and hepatic steatosis as well as possible early changes of portal hypertension  There is also ton pancreatic inflammatory edema and a focal density around the pancreas as well as gastritis  He had a follow-up MRI MRCP which showed a assist at the surface of the body of the pancreas extending to the greater curvature of the stomach of unclear etiology as well as multiple cystic lesions in the submucosal/intramural location in the stomach  There was no solid pancreatic mass noted  He had tumor markers drawn including a CA 19 9 and this was normal  We had to do a gastric emptying study due to his main complaint being vomiting and nausea but he was unable to complete this is due to vomiting  He reports since discharge he has been doing better with less episodes of vomiting but he still has pain and he is unable to be dinner because he has more pain then  He has not been utilizing Reglan much  The makes him feel nauseous  He is allergic to Zofran  He reports Phenergan seem to help him in the hospital    He is post with getting scheduled for an endoscopic ultrasound but he has been very stress as his wife recently had a stroke and was in the hospital so he has not had the scheduled yet  His father was also recently diagnosed with metastatic cancer located in the stomach, lungs, brain, but he does not know the primary source  He is concerned that the cyst on imaging indicates that he has cancer  He did lose about 50 lb over 2 months due to not eating but he has gained some of that back since been able to eat better over the past several weeks    He initially started about 340 lb       REVIEW OF SYSTEMS IS OTHERWISE NEGATIVE  Historical Information   Past Medical History:   Diagnosis Date    Back pain     Cardiac disease     GERD (gastroesophageal reflux disease)     Hypertension     Neck pain      Past Surgical History:   Procedure Laterality Date    BACK SURGERY      HERNIA REPAIR      HERNIA REPAIR  2018    KNEE SURGERY      NECK SURGERY  2005     Social History   Social History     Substance and Sexual Activity   Alcohol Use Not Currently     Social History     Substance and Sexual Activity   Drug Use Never     Social History     Tobacco Use   Smoking Status Current Every Day Smoker    Packs/day: 0 25   Smokeless Tobacco Never Used     Family History   Problem Relation Age of Onset    Diabetes Mother     Heart disease Mother     Cancer Father     No Known Problems Brother     Aneurysm Maternal Grandmother     No Known Problems Maternal Grandfather     No Known Problems Paternal Grandmother     No Known Problems Paternal Grandfather        Meds/Allergies       Current Outpatient Medications:     atenolol (TENORMIN) 50 mg tablet    lisinopril (ZESTRIL) 20 mg tablet    methadone (DOLOPHINE) 10 mg/mL oral concentrated solution    metoclopramide (REGLAN) 5 mg tablet    omeprazole (PriLOSEC) 20 mg delayed release capsule    pregabalin (LYRICA) 75 mg capsule    metoclopramide (REGLAN) 10 mg tablet    promethazine (PHENERGAN) 12 5 MG tablet    Allergies   Allergen Reactions    Iodinated Diagnostic Agents     Ketorolac Tromethamine      Other reaction(s): Other (Please comment)  Pt goes into shock if given    Ondansetron      Other reaction(s): Neuro complications (Please comment)  "Hands curl up  Face goes numb " per patient   A friend observed that he gets "violently ill "    3 May 2013: Zofran was given intraoperatively   No adverse, or even minor reaction has been observed after about 3 hours   Diphenhydramine Hives     Nothing major   "Can take it if I have to "    Penicillins Hives     Other reaction(s): Other (Please comment)  Trouble breathing    Shellfish Allergy     Varenicline Hives           Objective     Blood pressure 118/82, pulse 91, height 5' 8" (1 727 m), weight 134 kg (295 lb 8 oz)  Body mass index is 44 93 kg/m²  PHYSICAL EXAM:      General Appearance:   Alert, cooperative, no distress   HEENT:   Normocephalic, atraumatic, anicteric      Neck:  Supple, symmetrical, trachea midline   Lungs:   Clear to auscultation bilaterally; no rales, rhonchi or wheezing; respirations unlabored    Heart[de-identified]   Regular rate and rhythm; no murmur, rub, or gallop  Abdomen:   Soft, non-tender, non-distended; normal bowel sounds; no masses, no organomegaly    Genitalia:   Deferred    Rectal:   Deferred    Extremities:  No cyanosis, clubbing or edema    Pulses:  2+ and symmetric    Skin:  No jaundice, rashes, or lesions    Lymph nodes:  No palpable cervical lymphadenopathy        Lab Results:   No visits with results within 1 Day(s) from this visit     Latest known visit with results is:   Admission on 09/04/2019, Discharged on 09/08/2019   Component Date Value    WBC 09/04/2019 10 04     RBC 09/04/2019 4 91     Hemoglobin 09/04/2019 13 2     Hematocrit 09/04/2019 42 6     MCV 09/04/2019 87     MCH 09/04/2019 26 9     MCHC 09/04/2019 31 0*    RDW 09/04/2019 15 6*    MPV 09/04/2019 10 8     Platelets 86/85/2487 215     nRBC 09/04/2019 0     Neutrophils Relative 09/04/2019 86*    Immat GRANS % 09/04/2019 0     Lymphocytes Relative 09/04/2019 11*    Monocytes Relative 09/04/2019 3*    Eosinophils Relative 09/04/2019 0     Basophils Relative 09/04/2019 0     Neutrophils Absolute 09/04/2019 8 50*    Immature Grans Absolute 09/04/2019 0 04     Lymphocytes Absolute 09/04/2019 1 12     Monocytes Absolute 09/04/2019 0 33     Eosinophils Absolute 09/04/2019 0 04     Basophils Absolute 09/04/2019 0 01     Protime 09/04/2019 13 2     INR 09/04/2019 1 00     PTT 09/04/2019 29     Sodium 09/04/2019 140     Potassium 09/04/2019 4 2     Chloride 09/04/2019 101     CO2 09/04/2019 27     ANION GAP 09/04/2019 12     BUN 09/04/2019 9     Creatinine 09/04/2019 0 64     Glucose 09/04/2019 119     Calcium 09/04/2019 8 9     AST 09/04/2019 23     ALT 09/04/2019 22     Alkaline Phosphatase 09/04/2019 79     Total Protein 09/04/2019 7 5     Albumin 09/04/2019 3 1*    Total Bilirubin 09/04/2019 0 50     eGFR 09/04/2019 122     Lipase 09/04/2019 412*    LACTIC ACID 09/04/2019 0 9     Troponin I 09/04/2019 <0 02     Ventricular Rate 09/04/2019 61     Atrial Rate 09/04/2019 61     WA Interval 09/04/2019 186     QRSD Interval 09/04/2019 90     QT Interval 09/04/2019 484     QTC Interval 09/04/2019 487     P Axis 09/04/2019 -6     QRS Axis 09/04/2019 47     T Wave Wright 09/04/2019 28     WBC 09/05/2019 9 35     RBC 09/05/2019 4 38     Hemoglobin 09/05/2019 11 6*    Hematocrit 09/05/2019 36 7     MCV 09/05/2019 84     MCH 09/05/2019 26 5*    MCHC 09/05/2019 31 6     RDW 09/05/2019 15 2*    MPV 09/05/2019 11 9     Platelets 81/08/2956 120*    nRBC 09/05/2019 0     Neutrophils Relative 09/05/2019 76*    Immat GRANS % 09/05/2019 0     Lymphocytes Relative 09/05/2019 17     Monocytes Relative 09/05/2019 6     Eosinophils Relative 09/05/2019 1     Basophils Relative 09/05/2019 0     Neutrophils Absolute 09/05/2019 7 07     Immature Grans Absolute 09/05/2019 0 04     Lymphocytes Absolute 09/05/2019 1 63     Monocytes Absolute 09/05/2019 0 54     Eosinophils Absolute 09/05/2019 0 07     Basophils Absolute 09/05/2019 0 00     Total CK 09/05/2019 39     Sodium 09/05/2019 140     Potassium 09/05/2019 3 4*    Chloride 09/05/2019 104     CO2 09/05/2019 26     ANION GAP 09/05/2019 10     BUN 09/05/2019 9     Creatinine 09/05/2019 0 61     Glucose 09/05/2019 101     Calcium 09/05/2019 8 3     AST 09/05/2019 15     ALT 09/05/2019 22  Alkaline Phosphatase 09/05/2019 69     Total Protein 09/05/2019 6 5     Albumin 09/05/2019 2 7*    Total Bilirubin 09/05/2019 0 30     eGFR 09/05/2019 124     Magnesium 09/05/2019 2 0     Phosphorus 09/05/2019 2 8     Procalcitonin 09/05/2019 <0 05     LACTIC ACID 09/05/2019 1 0     Lipase 09/05/2019 479*    Cholesterol 09/05/2019 145     Triglycerides 09/05/2019 87     HDL, Direct 09/05/2019 28*    LDL Calculated 09/05/2019 100     Non-HDL-Chol (CHOL-HDL) 09/05/2019 117     Hemoglobin A1C 09/05/2019 6 0     EAG 09/05/2019 126     Hepatitis B Surface Ag 09/05/2019 Non-reactive     Hep A IgM 09/05/2019 Non-reactive     Hepatitis C Ab 09/05/2019 Non-reactive     Hep B C IgM 09/05/2019 Non-reactive     Troponin I 09/05/2019 <0 02     CA 19-9 09/05/2019 10     AFP TUMOR MARKER 09/05/2019 1 4     CEA 09/05/2019 <0 5     HIV-1/HIV-2 Ab 09/05/2019 Non-Reactive     Lipase 09/07/2019 506*    WBC 09/07/2019 11 07*    RBC 09/07/2019 4 74     Hemoglobin 09/07/2019 12 6     Hematocrit 09/07/2019 39 1     MCV 09/07/2019 83     MCH 09/07/2019 26 6*    MCHC 09/07/2019 32 2     RDW 09/07/2019 15 4*    MPV 09/07/2019 10 5     Platelets 88/06/6644 107*    nRBC 09/07/2019 0     Neutrophils Relative 09/07/2019 74     Immat GRANS % 09/07/2019 0     Lymphocytes Relative 09/07/2019 18     Monocytes Relative 09/07/2019 5     Eosinophils Relative 09/07/2019 3     Basophils Relative 09/07/2019 0     Neutrophils Absolute 09/07/2019 8 24*    Immature Grans Absolute 09/07/2019 0 03     Lymphocytes Absolute 09/07/2019 1 99     Monocytes Absolute 09/07/2019 0 50     Eosinophils Absolute 09/07/2019 0 30     Basophils Absolute 09/07/2019 0 01     Sodium 09/07/2019 136     Potassium 09/07/2019 3 8     Chloride 09/07/2019 102     CO2 09/07/2019 23     ANION GAP 09/07/2019 11     BUN 09/07/2019 5     Creatinine 09/07/2019 0 55*    Glucose 09/07/2019 77     Calcium 09/07/2019 8 3     AST 09/07/2019 22     ALT 09/07/2019 16     Alkaline Phosphatase 09/07/2019 77     Total Protein 09/07/2019 6 8     Albumin 09/07/2019 2 9*    Total Bilirubin 09/07/2019 0 60     eGFR 09/07/2019 129     Lipase 09/08/2019 470*    WBC 09/08/2019 12 04*    RBC 09/08/2019 4 85     Hemoglobin 09/08/2019 12 9     Hematocrit 09/08/2019 41 7     MCV 09/08/2019 86     MCH 09/08/2019 26 6*    MCHC 09/08/2019 30 9*    RDW 09/08/2019 15 6*    MPV 09/08/2019 10 7     Platelets 26/93/9565 208     nRBC 09/08/2019 0     Neutrophils Relative 09/08/2019 73     Immat GRANS % 09/08/2019 1     Lymphocytes Relative 09/08/2019 18     Monocytes Relative 09/08/2019 5     Eosinophils Relative 09/08/2019 3     Basophils Relative 09/08/2019 0     Neutrophils Absolute 09/08/2019 8 71*    Immature Grans Absolute 09/08/2019 0 07     Lymphocytes Absolute 09/08/2019 2 21     Monocytes Absolute 09/08/2019 0 63     Eosinophils Absolute 09/08/2019 0 41     Basophils Absolute 09/08/2019 0 01     Ventricular Rate 09/06/2019 64     Atrial Rate 09/06/2019 64     AR Interval 09/06/2019 152     QRSD Interval 09/06/2019 94     QT Interval 09/06/2019 482     QTC Interval 09/06/2019 497     P Axis 09/06/2019 1     QRS Axis 09/06/2019 11     T Wave Sheridan 09/06/2019 17     Ventricular Rate 09/07/2019 60     Atrial Rate 09/07/2019 60     AR Interval 09/07/2019 152     QRSD Interval 09/07/2019 96     QT Interval 09/07/2019 494     QTC Interval 09/07/2019 494     P Axis 09/07/2019 11     QRS Axis 09/07/2019 1     T Wave Sheridan 09/07/2019 11          Radiology Results:   Ct Abdomen Pelvis Wo Contrast    Result Date: 9/4/2019  Narrative: CT ABDOMEN AND PELVIS WITHOUT IV CONTRAST INDICATION:   epigastric pain/vomiting  COMPARISON:  None   TECHNIQUE:  CT examination of the abdomen and pelvis was performed without intravenous contrast   Axial, sagittal, and coronal 2D reformatted images were created from the source data and submitted for interpretation  Radiation dose length product (DLP) for this visit:  1503 mGy-cm   This examination, like all CT scans performed in the [facility], was performed utilizing techniques to minimize radiation dose exposure, including the use of iterative reconstruction and automated exposure control  Enteric contrast was administered  FINDINGS: ABDOMEN LOWER CHEST:  Bibasilar atelectasis is noted  LIVER/BILIARY TREE:  Liver is enlarged and diffusely decreased in density consistent with fatty change  Hepatic contours appear within normal limits  There are prominent tortuous left gastric, perisplenic, peripancreatic, and omental vessels suggesting  possible early changes of portal hypertension  No CT evidence of suspicious hepatic mass  Normal hepatic contours  No biliary dilatation  GALLBLADDER:  No calcified gallstones  No pericholecystic inflammatory change  SPLEEN:  Unremarkable  PANCREAS:  There is peripancreatic inflammatory edema  There is focal masslike density associated with the pancreatic body and proximal tail over an area estimated at approximately 5 9 x 2 9 cm on image 32 of series 2  No arielle pancreatic or peripancreatic fluid collection noted  ADRENAL GLANDS:  There is a sharply circumscribed posterior limb left adrenal nodule, 20 mm in size approximately 9 Hounsfield units in density most consistent with benign adrenal adenoma  KIDNEYS/URETERS:  Unremarkable  No hydronephrosis  STOMACH AND BOWEL:  There is marked thickening of the wall of the gastric antrum best seen on coronal image 54 of series 601 over axial image 34 of series 2  A there are 2 focal sites along the serosal wall of the greater curvature of the stomach which are inseparable from irregular soft tissue density associated with the pancreas, best visualized on image 69 of series 601 and image 33 of series 2  Small marginal intestine appear unremarkable  APPENDIX:  No findings to suggest appendicitis  ABDOMINOPELVIC CAVITY:  No ascites or free intraperitoneal air  No lymphadenopathy  VESSELS:  Unremarkable for patient's age  PELVIS REPRODUCTIVE ORGANS:  Unremarkable for patient's age  URINARY BLADDER:  Unremarkable  ABDOMINAL WALL/INGUINAL REGIONS:  Unremarkable  OSSEOUS STRUCTURES:  No acute fracture or destructive osseous lesion  Impression: Findings consistent with acute pancreatitis  There is questionable solid mass at junction of pancreatic body and tail and more accurate characterization with triple phase pancreatic protocol CT is strongly recommended to evaluate for possible pancreatic  adenocarcinoma  Thickening of the wall of the gastric antrum which could represent recurrent gastritis  Irregular soft tissue associated with the pancreas is inseparable from the serosal wall of the greater curvature of the stomach at 2 separate sites, gastric wall thickening also could represent reactive inflammatory wall thickening related to pancreatitis; alternatively, local invasion of the serosal wall of the stomach related to pancreatic tumor can also not be excluded on the basis of this noncontrast exam  Hepatomegaly and hepatic steatosis  Prominent vessels in the upper abdomen could indicate early changes of portal hypertension though the possibility that there is splenic vein thrombosis related to pancreatic mass must also be considered  I personally discussed this study with [address] [place] [address] on 9/4/2019 at 9:52 AM  Workstation performed: [id number]     Nm Gastric Emptying    Result Date: 9/6/2019  Narrative: GASTRIC EMPTYING STUDY INDICATION: vomiting for the past 6 months, food in stomach during outpatient EGD, assess for gastroparesis COMPARISON:  None available TECHNIQUE:   The study was performed following the oral administration of 1 0 mCi Tc-99m sulfur colloid combined with scrambled eggs, as part of a standard meal   Following the meal, the patient vomited during the 1st image   FINDINGS: As above, the patient vomited during the 1st image, with radiopharmaceutical activity seen external to the GI tract  Therefore, the examination could not be completed as an accurate determination of gastric emptying could not be performed  Impression: Incomplete examination secondary to patient vomiting during acquisition of the 1st image  Workstation performed: [id number]     Mri Abdomen W Wo Contrast And Mrcp    Result Date: 9/5/2019  Narrative: MRI OF THE ABDOMEN WITH AND WITHOUT CONTRAST WITH MRCP INDICATION:  Pancreatic mass COMPARISON: CT from September 4, 2019 TECHNIQUE:  The following pulse sequences were obtained:  Coronal and axial T2 with TE of 90 and 180 respectively, axial T2 with fat saturation, axial FIESTA fat-sat, axial T1-weighted in-and-out-of phase, axial DWI/ADC, pre-contrast axial T1 with fat saturation, post-contrast dynamic axial T1 with fat saturation at 20, 70, and 180 seconds, followed by coronal and 7 minute delayed axial T1 with fat saturation  3D MRCP images were obtained with radial thick slabs and projections  3D rendering was performed from the acquisition scanner  IV Contrast:  10 mL of gadobutrol injection (MULTI-DOSE) FINDINGS: LOWER CHEST:   Unremarkable  [place] LIVER: There is diffuse hepatic steatosis No suspicious mass  The hepatic veins and portal veins are patent  BILE DUCTS: No intrahepatic or extrahepatic bile duct dilation  Common bile duct is normal in caliber  No choledocholithiasis is seen GALLBLADDER:  Normal  PANCREAS: There is no pancreatic ductal dilation There is no pancreatic atrophy on the CT from September 4, 2019 a focal area of foot ill-defined margins extending to the posterior aspect of the stomach wall is described  Corresponding to this abnormality there is a focal cystic area with some surrounding infiltration extending up to the greater curvature  This mildly indents the surface of the pancreas  and measures 1 3 x 1 3 cm    There is mild tethering of the adjacent greater curvature ADRENAL GLANDS:  The left adrenal nodule seen on the recent CT is markedly T2 hyperintense and is most compatible with a cyst  SPLEEN:  Normal  KIDNEYS/PROXIMAL URETERS: No hydroureteronephrosis  No suspicious renal mass  BOWEL:   A intramural cyst is seen within the stomach, measuring 3 3 x 2 5 cm located at the junction of the body and antrum  Additional submucosal cyst seen in the wall of the stomach, measuring 2 3 x 2 4 cm  T2 hyperintensities also noted at the posterior aspect of the fundus of the stomach  An additional cyst is seen in the right upper abdomen located lateral to the cystic duct and common bile duct, measuring 1 5 x 1 5 cm  This is seen in image 20 series 3 PERITONEUM/RETROPERITONEUM: No ascites  LYMPH NODES: No abdominal lymphadenopathy  VASCULAR STRUCTURES:  No aneurysm  ABDOMINAL WALL:  Unremarkable  OSSEOUS STRUCTURES:  No suspicious osseous lesion  Impression: There is a cystic lesion at the surface of the body of the pancreas with the surrounding infiltration which extends to the greater curvature corresponding to the abnormality described on the CT, measuring 1 3 x 1 3 cm  This appears to extends into the pancreas with a narrow pedicle as seen in image 28 series 5  This is of indeterminate nature, this may be sequela of prior bout of pancreatitis, neoplasm less likely  There are additional cystic lesions in the submucosal/ intramural location in the stomach, these are indeterminate differential include infiltrating peripancreatic pseudocyst, cystic submucosal intrinsic stomach lesion or these may be sequela of  recent endoscopy performed at [facility] performed on July 9, 2019 Left adrenal cyst Additional cyst seen in the right upper abdomen lateral to the common bile duct measuring 1 5 x 1 5 cm  There is no solid pancreatic mass seen   In order to characterize these abnormalities short interval follow-up MRI in 2-3 months or endoscopic ultrasound can be considered  Hepatic steatosis No biliary dilation or choledocholithiasis  The study was marked in EPIC for significant notification   Workstation performed: [id number]

## 2019-09-23 DIAGNOSIS — R11.2 NAUSEA AND VOMITING, INTRACTABILITY OF VOMITING NOT SPECIFIED, UNSPECIFIED VOMITING TYPE: Primary | ICD-10-CM

## 2019-09-23 RX ORDER — PROMETHAZINE HYDROCHLORIDE 25 MG/1
25 SUPPOSITORY RECTAL EVERY 6 HOURS PRN
Qty: 12 EACH | Refills: 0 | Status: SHIPPED | OUTPATIENT
Start: 2019-09-23 | End: 2019-10-02

## 2019-09-23 NOTE — TELEPHONE ENCOUNTER
Spoke with patient history of nausea, pancreatic pseudocyst, gastric cyst    Patient c/o continued symptoms no changed  He continues to vomit 5-6 times daily while taking the phenergan  He has been unable to keep down his methadone for 3 days  He has 8/10 lower mid abdominal pain  BM and urination is normal  He stopped taking reglan  He has not scheduled is EGD/EUS w [facility] he has the  number to call [phone]  Spoke with Jorge Alberto Lloyd she will call him today and schedule  Any suggestions?possible suppositories or go to ED?

## 2019-09-23 NOTE — TELEPHONE ENCOUNTER
Shemar Warner pt - Pt is very weak and cannot stop vomiting, Pt is trying to avoid going to the ER, Call [phone]   Ty

## 2019-09-23 NOTE — TELEPHONE ENCOUNTER
We can try phenergan suppositories, the 25 mg dose but if he is not able to tolerate PO in the next 24 hours he should go to the ED

## 2019-09-23 NOTE — TELEPHONE ENCOUNTER
Called insurance spoke with Gatito Morgan call ref# [id number] no auth required, this service has to be on the physician fee schedule, which I don't know anything about  Gatito Morgan stated this is something the physician has

## 2019-09-23 NOTE — TELEPHONE ENCOUNTER
EUS scheduled with Dr Schumacher in [facility] on 10/2/2019  I gave pt verbal instructions/mailed

## 2019-09-23 NOTE — TELEPHONE ENCOUNTER
[facility] would like a call back regarding this pt's situation, whether he will be seen here today or by his pcp  Please call [phone]   ty

## 2019-09-24 ENCOUNTER — APPOINTMENT (EMERGENCY)
Dept: CT IMAGING | Facility: HOSPITAL | Age: 39
DRG: 663 | End: 2019-09-24
Payer: COMMERCIAL

## 2019-09-24 ENCOUNTER — HOSPITAL ENCOUNTER (INPATIENT)
Facility: HOSPITAL | Age: 39
LOS: 3 days | Discharge: HOME | DRG: 663 | End: 2019-09-27
Attending: EMERGENCY MEDICINE | Admitting: INTERNAL MEDICINE
Payer: COMMERCIAL

## 2019-09-24 DIAGNOSIS — K29.80 ACUTE DUODENITIS: ICD-10-CM

## 2019-09-24 DIAGNOSIS — K86.3 PANCREATIC PSEUDOCYST: Primary | ICD-10-CM

## 2019-09-24 DIAGNOSIS — K85.90 ACUTE PANCREATITIS: ICD-10-CM

## 2019-09-24 DIAGNOSIS — K85.90 ACUTE PANCREATITIS, UNSPECIFIED COMPLICATION STATUS, UNSPECIFIED PANCREATITIS TYPE: ICD-10-CM

## 2019-09-24 DIAGNOSIS — K86.89 PANCREATIC MASS: ICD-10-CM

## 2019-09-24 DIAGNOSIS — R11.2 INTRACTABLE VOMITING WITH NAUSEA, UNSPECIFIED VOMITING TYPE: ICD-10-CM

## 2019-09-24 DIAGNOSIS — K29.70 GASTRITIS: ICD-10-CM

## 2019-09-24 LAB
ALBUMIN SERPL BCP-MCNC: 2.9 G/DL (ref 3.5–5)
ALP SERPL-CCNC: 96 U/L (ref 46–116)
ALT SERPL W P-5'-P-CCNC: 21 U/L (ref 12–78)
ANION GAP SERPL CALCULATED.3IONS-SCNC: 13 MMOL/L (ref 4–13)
AST SERPL W P-5'-P-CCNC: 29 U/L (ref 5–45)
BASOPHILS # BLD AUTO: 0.04 THOUSANDS/ΜL (ref 0–0.1)
BASOPHILS NFR BLD AUTO: 0 % (ref 0–1)
BILIRUB SERPL-MCNC: 0.8 MG/DL (ref 0.2–1)
BUN SERPL-MCNC: 10 MG/DL (ref 5–25)
CALCIUM SERPL-MCNC: 8.5 MG/DL (ref 8.3–10.1)
CHLORIDE SERPL-SCNC: 97 MMOL/L (ref 100–108)
CO2 SERPL-SCNC: 24 MMOL/L (ref 21–32)
CREAT SERPL-MCNC: 0.67 MG/DL (ref 0.6–1.3)
EOSINOPHIL # BLD AUTO: 0.06 THOUSAND/ΜL (ref 0–0.61)
EOSINOPHIL NFR BLD AUTO: 0 % (ref 0–6)
ERYTHROCYTE [DISTWIDTH] IN BLOOD BY AUTOMATED COUNT: 16 % (ref 11.6–15.1)
GFR SERPL CREATININE-BSD FRML MDRD: 119 ML/MIN/1.73SQ M
GLUCOSE SERPL-MCNC: 151 MG/DL (ref 65–140)
HCT VFR BLD AUTO: 43.2 % (ref 36.5–49.3)
HGB BLD-MCNC: 13.7 G/DL (ref 12–17)
IMM GRANULOCYTES # BLD AUTO: 0.2 THOUSAND/UL (ref 0–0.2)
IMM GRANULOCYTES NFR BLD AUTO: 1 % (ref 0–2)
LIPASE SERPL-CCNC: 457 U/L (ref 73–393)
LYMPHOCYTES # BLD AUTO: 1.48 THOUSANDS/ΜL (ref 0.6–4.47)
LYMPHOCYTES NFR BLD AUTO: 7 % (ref 14–44)
MCH RBC QN AUTO: 26.8 PG (ref 26.8–34.3)
MCHC RBC AUTO-ENTMCNC: 31.7 G/DL (ref 31.4–37.4)
MCV RBC AUTO: 85 FL (ref 82–98)
MONOCYTES # BLD AUTO: 1.21 THOUSAND/ΜL (ref 0.17–1.22)
MONOCYTES NFR BLD AUTO: 6 % (ref 4–12)
NEUTROPHILS # BLD AUTO: 18.67 THOUSANDS/ΜL (ref 1.85–7.62)
NEUTS SEG NFR BLD AUTO: 86 % (ref 43–75)
NRBC BLD AUTO-RTO: 0 /100 WBCS
PLATELET # BLD AUTO: 271 THOUSANDS/UL (ref 149–390)
PMV BLD AUTO: 9.9 FL (ref 8.9–12.7)
POTASSIUM SERPL-SCNC: 4.3 MMOL/L (ref 3.5–5.3)
PROT SERPL-MCNC: 7.3 G/DL (ref 6.4–8.2)
RBC # BLD AUTO: 5.11 MILLION/UL (ref 3.88–5.62)
SODIUM SERPL-SCNC: 134 MMOL/L (ref 136–145)
WBC # BLD AUTO: 21.66 THOUSAND/UL (ref 4.31–10.16)

## 2019-09-24 PROCEDURE — 96361 HYDRATE IV INFUSION ADD-ON: CPT

## 2019-09-24 PROCEDURE — 36415 COLL VENOUS BLD VENIPUNCTURE: CPT | Performed by: EMERGENCY MEDICINE

## 2019-09-24 PROCEDURE — 71250 CT THORAX DX C-: CPT

## 2019-09-24 PROCEDURE — 99285 EMERGENCY DEPT VISIT HI MDM: CPT | Performed by: EMERGENCY MEDICINE

## 2019-09-24 PROCEDURE — 83690 ASSAY OF LIPASE: CPT | Performed by: EMERGENCY MEDICINE

## 2019-09-24 PROCEDURE — 85025 COMPLETE CBC W/AUTO DIFF WBC: CPT | Performed by: EMERGENCY MEDICINE

## 2019-09-24 PROCEDURE — 74176 CT ABD & PELVIS W/O CONTRAST: CPT

## 2019-09-24 PROCEDURE — 80053 COMPREHEN METABOLIC PANEL: CPT | Performed by: EMERGENCY MEDICINE

## 2019-09-24 PROCEDURE — 96360 HYDRATION IV INFUSION INIT: CPT

## 2019-09-24 PROCEDURE — 99285 EMERGENCY DEPT VISIT HI MDM: CPT

## 2019-09-24 RX ORDER — METOCLOPRAMIDE HYDROCHLORIDE 5 MG/ML
10 INJECTION INTRAMUSCULAR; INTRAVENOUS ONCE
Status: COMPLETED | OUTPATIENT
Start: 2019-09-24 | End: 2019-09-24

## 2019-09-24 RX ORDER — HYDROMORPHONE HCL/PF 1 MG/ML
0.5 SYRINGE (ML) INJECTION EVERY 6 HOURS PRN
Status: DISCONTINUED | OUTPATIENT
Start: 2019-09-24 | End: 2019-09-27 | Stop reason: HOSPADM

## 2019-09-24 RX ORDER — FENTANYL CITRATE 50 UG/ML
75 INJECTION, SOLUTION INTRAMUSCULAR; INTRAVENOUS ONCE
Status: COMPLETED | OUTPATIENT
Start: 2019-09-24 | End: 2019-09-24

## 2019-09-24 RX ORDER — SODIUM CHLORIDE 9 MG/ML
75 INJECTION, SOLUTION INTRAVENOUS CONTINUOUS
Status: DISCONTINUED | OUTPATIENT
Start: 2019-09-24 | End: 2019-09-27 | Stop reason: HOSPADM

## 2019-09-24 RX ORDER — PREGABALIN 100 MG/1
100 CAPSULE ORAL 2 TIMES DAILY
Status: DISCONTINUED | OUTPATIENT
Start: 2019-09-25 | End: 2019-09-27 | Stop reason: HOSPADM

## 2019-09-24 RX ORDER — ACETAMINOPHEN 325 MG/1
650 TABLET ORAL EVERY 6 HOURS PRN
Status: DISCONTINUED | OUTPATIENT
Start: 2019-09-24 | End: 2019-09-27 | Stop reason: HOSPADM

## 2019-09-24 RX ORDER — OLANZAPINE 10 MG/1
10 TABLET, ORALLY DISINTEGRATING ORAL ONCE
Status: COMPLETED | OUTPATIENT
Start: 2019-09-24 | End: 2019-09-24

## 2019-09-24 RX ORDER — PROMETHAZINE HYDROCHLORIDE 25 MG/ML
25 INJECTION, SOLUTION INTRAMUSCULAR; INTRAVENOUS EVERY 6 HOURS PRN
Status: DISCONTINUED | OUTPATIENT
Start: 2019-09-24 | End: 2019-09-25

## 2019-09-24 RX ORDER — METHADONE HYDROCHLORIDE 10 MG/ML
110 CONCENTRATE ORAL
Status: CANCELLED | OUTPATIENT
Start: 2019-09-25

## 2019-09-24 RX ORDER — HYDROMORPHONE HCL/PF 1 MG/ML
1 SYRINGE (ML) INJECTION EVERY 4 HOURS PRN
Status: DISCONTINUED | OUTPATIENT
Start: 2019-09-24 | End: 2019-09-25

## 2019-09-24 RX ORDER — HYDRALAZINE HYDROCHLORIDE 20 MG/ML
10 INJECTION INTRAMUSCULAR; INTRAVENOUS EVERY 6 HOURS PRN
Status: DISCONTINUED | OUTPATIENT
Start: 2019-09-24 | End: 2019-09-27 | Stop reason: HOSPADM

## 2019-09-24 RX ORDER — METOCLOPRAMIDE HYDROCHLORIDE 5 MG/ML
10 INJECTION INTRAMUSCULAR; INTRAVENOUS EVERY 6 HOURS PRN
Status: CANCELLED | OUTPATIENT
Start: 2019-09-24

## 2019-09-24 RX ADMIN — FENTANYL CITRATE 75 MCG: 50 INJECTION INTRAMUSCULAR; INTRAVENOUS at 22:06

## 2019-09-24 RX ADMIN — SODIUM CHLORIDE 1000 ML: 0.9 INJECTION, SOLUTION INTRAVENOUS at 20:11

## 2019-09-24 RX ADMIN — METOCLOPRAMIDE 10 MG: 5 INJECTION, SOLUTION INTRAMUSCULAR; INTRAVENOUS at 22:06

## 2019-09-24 RX ADMIN — OLANZAPINE 10 MG: 10 TABLET, ORALLY DISINTEGRATING ORAL at 20:07

## 2019-09-25 ENCOUNTER — APPOINTMENT (INPATIENT)
Dept: ULTRASOUND IMAGING | Facility: HOSPITAL | Age: 39
DRG: 663 | End: 2019-09-25
Payer: COMMERCIAL

## 2019-09-25 PROBLEM — K29.80 ACUTE DUODENITIS: Status: ACTIVE | Noted: 2019-09-25

## 2019-09-25 PROBLEM — R11.2 INTRACTABLE VOMITING WITH NAUSEA: Status: ACTIVE | Noted: 2019-09-25

## 2019-09-25 PROBLEM — K86.2 PANCREATIC CYST: Status: ACTIVE | Noted: 2019-09-04

## 2019-09-25 PROBLEM — R11.10 VOMITING: Status: ACTIVE | Noted: 2019-09-25

## 2019-09-25 PROBLEM — D72.829 LEUKOCYTOSIS: Status: ACTIVE | Noted: 2019-09-25

## 2019-09-25 LAB
ALBUMIN SERPL BCP-MCNC: 2.7 G/DL (ref 3.5–5)
ALP SERPL-CCNC: 88 U/L (ref 46–116)
ALT SERPL W P-5'-P-CCNC: 18 U/L (ref 12–78)
ANION GAP SERPL CALCULATED.3IONS-SCNC: 12 MMOL/L (ref 4–13)
APTT PPP: 32 SECONDS (ref 23–37)
AST SERPL W P-5'-P-CCNC: 16 U/L (ref 5–45)
BASOPHILS # BLD AUTO: 0.01 THOUSANDS/ΜL (ref 0–0.1)
BASOPHILS NFR BLD AUTO: 0 % (ref 0–1)
BILIRUB SERPL-MCNC: 0.5 MG/DL (ref 0.2–1)
BUN SERPL-MCNC: 7 MG/DL (ref 5–25)
CALCIUM SERPL-MCNC: 8.6 MG/DL (ref 8.3–10.1)
CHLORIDE SERPL-SCNC: 101 MMOL/L (ref 100–108)
CO2 SERPL-SCNC: 25 MMOL/L (ref 21–32)
CREAT SERPL-MCNC: 0.59 MG/DL (ref 0.6–1.3)
EOSINOPHIL # BLD AUTO: 0.08 THOUSAND/ΜL (ref 0–0.61)
EOSINOPHIL NFR BLD AUTO: 1 % (ref 0–6)
ERYTHROCYTE [DISTWIDTH] IN BLOOD BY AUTOMATED COUNT: 16.1 % (ref 11.6–15.1)
GFR SERPL CREATININE-BSD FRML MDRD: 126 ML/MIN/1.73SQ M
GLUCOSE SERPL-MCNC: 137 MG/DL (ref 65–140)
GLUCOSE SERPL-MCNC: 84 MG/DL (ref 65–140)
GLUCOSE SERPL-MCNC: 85 MG/DL (ref 65–140)
HCT VFR BLD AUTO: 39.5 % (ref 36.5–49.3)
HGB BLD-MCNC: 12.6 G/DL (ref 12–17)
IMM GRANULOCYTES # BLD AUTO: 0.13 THOUSAND/UL (ref 0–0.2)
IMM GRANULOCYTES NFR BLD AUTO: 1 % (ref 0–2)
INR PPP: 1.1 (ref 0.84–1.19)
LYMPHOCYTES # BLD AUTO: 2.12 THOUSANDS/ΜL (ref 0.6–4.47)
LYMPHOCYTES NFR BLD AUTO: 15 % (ref 14–44)
MAGNESIUM SERPL-MCNC: 2 MG/DL (ref 1.6–2.6)
MCH RBC QN AUTO: 27.2 PG (ref 26.8–34.3)
MCHC RBC AUTO-ENTMCNC: 31.9 G/DL (ref 31.4–37.4)
MCV RBC AUTO: 85 FL (ref 82–98)
MONOCYTES # BLD AUTO: 0.95 THOUSAND/ΜL (ref 0.17–1.22)
MONOCYTES NFR BLD AUTO: 7 % (ref 4–12)
NEUTROPHILS # BLD AUTO: 10.77 THOUSANDS/ΜL (ref 1.85–7.62)
NEUTS SEG NFR BLD AUTO: 76 % (ref 43–75)
NRBC BLD AUTO-RTO: 0 /100 WBCS
PHOSPHATE SERPL-MCNC: 3.4 MG/DL (ref 2.7–4.5)
PLATELET # BLD AUTO: 186 THOUSANDS/UL (ref 149–390)
PMV BLD AUTO: 11.2 FL (ref 8.9–12.7)
POTASSIUM SERPL-SCNC: 3.7 MMOL/L (ref 3.5–5.3)
PROT SERPL-MCNC: 7 G/DL (ref 6.4–8.2)
PROTHROMBIN TIME: 14.2 SECONDS (ref 11.6–14.5)
RBC # BLD AUTO: 4.64 MILLION/UL (ref 3.88–5.62)
SODIUM SERPL-SCNC: 138 MMOL/L (ref 136–145)
WBC # BLD AUTO: 14.06 THOUSAND/UL (ref 4.31–10.16)

## 2019-09-25 PROCEDURE — 80053 COMPREHEN METABOLIC PANEL: CPT | Performed by: PHYSICIAN ASSISTANT

## 2019-09-25 PROCEDURE — 83735 ASSAY OF MAGNESIUM: CPT | Performed by: PHYSICIAN ASSISTANT

## 2019-09-25 PROCEDURE — 85025 COMPLETE CBC W/AUTO DIFF WBC: CPT | Performed by: PHYSICIAN ASSISTANT

## 2019-09-25 PROCEDURE — 85730 THROMBOPLASTIN TIME PARTIAL: CPT | Performed by: PHYSICIAN ASSISTANT

## 2019-09-25 PROCEDURE — 99223 1ST HOSP IP/OBS HIGH 75: CPT | Performed by: INTERNAL MEDICINE

## 2019-09-25 PROCEDURE — 76705 ECHO EXAM OF ABDOMEN: CPT

## 2019-09-25 PROCEDURE — 82948 REAGENT STRIP/BLOOD GLUCOSE: CPT

## 2019-09-25 PROCEDURE — 84100 ASSAY OF PHOSPHORUS: CPT | Performed by: PHYSICIAN ASSISTANT

## 2019-09-25 PROCEDURE — 85610 PROTHROMBIN TIME: CPT | Performed by: PHYSICIAN ASSISTANT

## 2019-09-25 PROCEDURE — C9113 INJ PANTOPRAZOLE SODIUM, VIA: HCPCS | Performed by: PHYSICIAN ASSISTANT

## 2019-09-25 PROCEDURE — 36415 COLL VENOUS BLD VENIPUNCTURE: CPT | Performed by: PHYSICIAN ASSISTANT

## 2019-09-25 RX ORDER — HYDROMORPHONE HCL/PF 1 MG/ML
1 SYRINGE (ML) INJECTION
Status: DISCONTINUED | OUTPATIENT
Start: 2019-09-25 | End: 2019-09-27 | Stop reason: HOSPADM

## 2019-09-25 RX ORDER — HYDROMORPHONE HCL/PF 1 MG/ML
0.5 SYRINGE (ML) INJECTION ONCE
Status: COMPLETED | OUTPATIENT
Start: 2019-09-25 | End: 2019-09-25

## 2019-09-25 RX ORDER — PROMETHAZINE HYDROCHLORIDE 25 MG/ML
12.5 INJECTION, SOLUTION INTRAMUSCULAR; INTRAVENOUS EVERY 6 HOURS PRN
Status: DISCONTINUED | OUTPATIENT
Start: 2019-09-25 | End: 2019-09-27 | Stop reason: HOSPADM

## 2019-09-25 RX ORDER — METOCLOPRAMIDE HYDROCHLORIDE 5 MG/ML
10 INJECTION INTRAMUSCULAR; INTRAVENOUS EVERY 6 HOURS SCHEDULED
Status: DISCONTINUED | OUTPATIENT
Start: 2019-09-25 | End: 2019-09-27 | Stop reason: HOSPADM

## 2019-09-25 RX ORDER — PANTOPRAZOLE SODIUM 40 MG/1
40 INJECTION, POWDER, FOR SOLUTION INTRAVENOUS EVERY 12 HOURS SCHEDULED
Status: DISCONTINUED | OUTPATIENT
Start: 2019-09-25 | End: 2019-09-27

## 2019-09-25 RX ADMIN — HYDROMORPHONE HYDROCHLORIDE 0.5 MG: 1 INJECTION, SOLUTION INTRAMUSCULAR; INTRAVENOUS; SUBCUTANEOUS at 22:15

## 2019-09-25 RX ADMIN — PREGABALIN 100 MG: 100 CAPSULE ORAL at 08:13

## 2019-09-25 RX ADMIN — HYDROMORPHONE HYDROCHLORIDE 1 MG: 1 INJECTION, SOLUTION INTRAMUSCULAR; INTRAVENOUS; SUBCUTANEOUS at 06:23

## 2019-09-25 RX ADMIN — HYDROMORPHONE HYDROCHLORIDE 0.5 MG: 1 INJECTION, SOLUTION INTRAMUSCULAR; INTRAVENOUS; SUBCUTANEOUS at 13:27

## 2019-09-25 RX ADMIN — ONDANSETRON 8 MG: 2 INJECTION INTRAMUSCULAR; INTRAVENOUS at 00:00

## 2019-09-25 RX ADMIN — PROMETHAZINE HYDROCHLORIDE 12.5 MG: 25 INJECTION INTRAMUSCULAR; INTRAVENOUS at 15:41

## 2019-09-25 RX ADMIN — HYDROMORPHONE HYDROCHLORIDE 0.5 MG: 1 INJECTION, SOLUTION INTRAMUSCULAR; INTRAVENOUS; SUBCUTANEOUS at 23:07

## 2019-09-25 RX ADMIN — PANTOPRAZOLE SODIUM 40 MG: 40 INJECTION, POWDER, FOR SOLUTION INTRAVENOUS at 01:31

## 2019-09-25 RX ADMIN — METOCLOPRAMIDE 10 MG: 5 INJECTION, SOLUTION INTRAMUSCULAR; INTRAVENOUS at 12:44

## 2019-09-25 RX ADMIN — PROMETHAZINE HYDROCHLORIDE 12.5 MG: 25 INJECTION INTRAMUSCULAR; INTRAVENOUS at 22:02

## 2019-09-25 RX ADMIN — SODIUM CHLORIDE 75 ML/HR: 0.9 INJECTION, SOLUTION INTRAVENOUS at 13:54

## 2019-09-25 RX ADMIN — PREGABALIN 100 MG: 100 CAPSULE ORAL at 17:49

## 2019-09-25 RX ADMIN — HYDROMORPHONE HYDROCHLORIDE 1 MG: 1 INJECTION, SOLUTION INTRAMUSCULAR; INTRAVENOUS; SUBCUTANEOUS at 15:33

## 2019-09-25 RX ADMIN — METOCLOPRAMIDE 10 MG: 5 INJECTION, SOLUTION INTRAMUSCULAR; INTRAVENOUS at 17:49

## 2019-09-25 RX ADMIN — HYDROMORPHONE HYDROCHLORIDE 1 MG: 1 INJECTION, SOLUTION INTRAMUSCULAR; INTRAVENOUS; SUBCUTANEOUS at 00:00

## 2019-09-25 RX ADMIN — ACETAMINOPHEN 650 MG: 325 TABLET, FILM COATED ORAL at 15:11

## 2019-09-25 RX ADMIN — HYDROMORPHONE HYDROCHLORIDE 1 MG: 1 INJECTION, SOLUTION INTRAMUSCULAR; INTRAVENOUS; SUBCUTANEOUS at 23:06

## 2019-09-25 RX ADMIN — PANTOPRAZOLE SODIUM 40 MG: 40 INJECTION, POWDER, FOR SOLUTION INTRAVENOUS at 22:02

## 2019-09-25 RX ADMIN — HYDROMORPHONE HYDROCHLORIDE 1 MG: 1 INJECTION, SOLUTION INTRAMUSCULAR; INTRAVENOUS; SUBCUTANEOUS at 12:42

## 2019-09-25 RX ADMIN — SODIUM CHLORIDE 75 ML/HR: 0.9 INJECTION, SOLUTION INTRAVENOUS at 00:00

## 2019-09-25 RX ADMIN — HYDROMORPHONE HYDROCHLORIDE 1 MG: 1 INJECTION, SOLUTION INTRAMUSCULAR; INTRAVENOUS; SUBCUTANEOUS at 19:33

## 2019-09-25 NOTE — ASSESSMENT & PLAN NOTE
· Lipase Appears to be improved  IV fluids, IV pain med  · GI did EGD today, positive rishi ulcer and some gastritis  Definite compression of the antrum from pancreatic cyst   Plan to stabilize advance diet and obtain f/u for EUS next week at previously scheduled appointment    · Patient tolerated Clears, will advance diet to full liquid for the am

## 2019-09-25 NOTE — PLAN OF CARE
Problem: Potential for Falls  Goal: Patient will remain free of falls  Description  INTERVENTIONS:  - Assess patient frequently for physical needs  -  Identify cognitive and physical deficits and behaviors that affect risk of falls    -  [place] fall precautions as indicated by assessment   - Educate patient/family on patient safety including physical limitations  - Instruct patient to call for assistance with activity based on assessment  - Modify environment to reduce risk of injury  - Consider OT/PT consult to assist with strengthening/mobility  Outcome: Progressing     Problem: PAIN - ADULT  Goal: Verbalizes/displays adequate comfort level or baseline comfort level  Description  Interventions:  - Encourage patient to monitor pain and request assistance  - Assess pain using appropriate pain scale  - Administer analgesics based on type and severity of pain and evaluate response  - Implement non-pharmacological measures as appropriate and evaluate response  - Consider cultural and social influences on pain and pain management  - Notify physician/advanced practitioner if interventions unsuccessful or patient reports new pain  Outcome: Progressing     Problem: INFECTION - ADULT  Goal: Absence or prevention of progression during hospitalization  Description  INTERVENTIONS:  - Assess and monitor for signs and symptoms of infection  - Monitor lab/diagnostic results  - Monitor all insertion sites, i e  indwelling lines, tubes, and drains  - Monitor endotracheal if appropriate and nasal secretions for changes in amount and color  - [place] appropriate cooling/warming therapies per order  - Administer medications as ordered  - Instruct and encourage patient and family to use good hand hygiene technique  - Identify and instruct in appropriate isolation precautions for identified infection/condition  Outcome: Progressing  Goal: Absence of fever/infection during neutropenic period  Description  INTERVENTIONS:  - Monitor WBC    Outcome: Progressing     Problem: SAFETY ADULT  Goal: Maintain or return to baseline ADL function  Description  INTERVENTIONS:  -  Assess patient's ability to carry out ADLs; assess patient's baseline for ADL function and identify physical deficits which impact ability to perform ADLs (bathing, care of mouth/teeth, toileting, grooming, dressing, etc )  - Assess/evaluate cause of self-care deficits   - Assess range of motion  - Assess patient's mobility; develop plan if impaired  - Assess patient's need for assistive devices and provide as appropriate  - Encourage maximum independence but intervene and supervise when necessary  - Involve family in performance of ADLs  - Assess for home care needs following discharge   - Consider OT consult to assist with ADL evaluation and planning for discharge  - Provide patient education as appropriate  Outcome: Progressing  Goal: Maintain or return mobility status to optimal level  Description  INTERVENTIONS:  - Assess patient's baseline mobility status (ambulation, transfers, stairs, etc )    - Identify cognitive and physical deficits and behaviors that affect mobility  - Identify mobility aids required to assist with transfers and/or ambulation (gait belt, sit-to-stand, lift, walker, cane, etc )  - [place] fall precautions as indicated by assessment  - Record patient progress and toleration of activity level on Mobility SBAR; progress patient to next Phase/Stage  - Instruct patient to call for assistance with activity based on assessment  - Consider rehabilitation consult to assist with strengthening/weightbearing, etc   Outcome: Progressing     Problem: DISCHARGE PLANNING  Goal: Discharge to home or other facility with appropriate resources  Description  INTERVENTIONS:  - Identify barriers to discharge w/patient and caregiver  - Arrange for needed discharge resources and transportation as appropriate  - Identify discharge learning needs (meds, wound care, etc )  - Arrange for interpretive services to assist at discharge as needed  - Refer to Case Management Department for coordinating discharge planning if the patient needs post-hospital services based on physician/advanced practitioner order or complex needs related to functional status, cognitive ability, or social support system  Outcome: Progressing     Problem: Knowledge Deficit  Goal: Patient/family/caregiver demonstrates understanding of disease process, treatment plan, medications, and discharge instructions  Description  Complete learning assessment and assess knowledge base    Interventions:  - Provide teaching at level of understanding  - Provide teaching via preferred learning methods  Outcome: Progressing

## 2019-09-25 NOTE — ED NOTES
1  CC - chest pain, vomiting  Pt admitted for Pancreatic pseudocyst  Awaiting GI consult  2  Admission related to injury? - no    3  Orientation status - A&Ox4    4  Abnormal labs/abnormal focused assessment/vitals - WBC 14      5  Medications/drips - normal saline infusing at 75ml/hr    6  Last time narcotics given - 1mg Dilaudid given at 1242     7  IV lines/drains/etc - 20 L AC    8  Isolation status - n/a     9  Skin - intact     10  Ambulation - independent     11   ED nurse's name/# -  [phone] [facility],[address], RN  09/25/19 9171

## 2019-09-25 NOTE — ED NOTES
1  CC vomiting and chest pain  2  Is this admission r/t an injury? no  3  Orientation status alert and oriented  4  Abnormal labs, assessment, vitals yes  5  Medication/ drips NSS  6  Last time narcotics given 2206 fentanyl 75mg  7  IV lines, drains, etc  NSS  8  Isolation status no  9  Skin  10  Ambulation status independant  11   ED RN phone number Sergey, [phone] [facility]  09/24/19 [id number]

## 2019-09-25 NOTE — ED NOTES
Resting soundly and comfortably with eyes closed  IV maintained       Stef Atkinson RN  09/24/19 [id number]

## 2019-09-25 NOTE — ASSESSMENT & PLAN NOTE
· WBC is 21 6 today, he is without fever or chills, no diarrhea reported  · Likely due to intractable nausea vomiting  · Continue to monitor

## 2019-09-25 NOTE — ASSESSMENT & PLAN NOTE
Ct scan of abdomen and pelvis reveals  development of pancreatitis with subsequent increase in the previous pseudocyst which extends into the stomach wall as described above   The increasing bilobed cystic structure in the stomach wall contributes to luminal narrowing of the antrum of stomach which may explain the patient's vomiting  Emelynfletcher Lopez is also secondary inflammatory changes of the 3rd and 4th portion of duodenum which cannot be further evaluated without oral contrast material      · Will admit to med/surg level of care  · NPO for now  · Normal saline IV fluids  · Pain control  · IV Phenergan and Zofran  · NPO  · IR consult, GI consult  · Protonix IV q 12 for GI prophylaxis

## 2019-09-25 NOTE — H&P
H&P- Karma Koehler 1980, 37 y o  male MRN: [id number]    Unit/Bed#: ED 06 Encounter: [id number]    Primary Care Provider: Devon Iqbal DO   Date and time admitted to hospital: 9/24/2019  7:16 PM        Intractable vomiting with nausea  Assessment & Plan  Ct scan of abdomen and pelvis reveals  development of pancreatitis with subsequent increase in the previous pseudocyst which extends into the stomach wall as described above   The increasing bilobed cystic structure in the stomach wall contributes to luminal narrowing of the antrum of stomach which may explain the patient's vomiting  Fabiana Deacon is also secondary inflammatory changes of the 3rd and 4th portion of duodenum which cannot be further evaluated without oral contrast material      · Will admit to med/surg level of care  · NPO for now  · Normal saline IV fluids  · Pain control  · IV Phenergan and Zofran  · NPO  · IR consult, GI consult  · Protonix IV q 12 for GI prophylaxis          Pancreatic cyst  Assessment & Plan  · Pancreatic pseudocyst/cyst/gastric cyst  · Gastric cyst is increasing in size according to recent CT scan  · Patient was getting further workup outpatient with GI  · He was to plan for an EGD/EUS with FNA for cystic lesions/pancreas/CBD from abnormal MRI findings on 9/05/2019  ·  MRI/MRCP showed a cyst lesion at the surface of the body of the pancreas extending to the greater curvature of the stomach without associated PD dilatation and thought to be a possible pseudocyst; additional cystic lesions noted in the submucosal/intramural location in the stomach, additional cyst seen in the RUQ lateral to the CBD measuring 1 5 x 1 5 cm  · CA 19-9 normal  · GI consult  · IR consult          Leukocytosis  Assessment & Plan  · WBC is 21 6 today, he is without fever or chills, no diarrhea reported  · Likely due to intractable nausea vomiting  · Continue to monitor    Hypertension  Assessment & Plan  · IV hydralazine p r n   · Held oral medications    Opiate dependence, continuous (HCC)  Assessment & Plan  · On chronic methadone treatment for chronic neck and back pain  · Hold methadone during hospitalization while utilizing p r n  IV Dilaudid    Tobacco use  Assessment & Plan  · Patient is down to few cigarettes daily  · Declined nicotine patch    Gastritis  Assessment & Plan  · NPO  · IV Protonix    Elevated Lipase  Assessment & Plan  · Appears to be slightly elevated, CT scan shows development of pancreatitis  · Keep NPO  · IV fluids, IV pain med  · GI consult    VTE Prophylaxis: Enoxaparin (Lovenox)  / sequential compression device   Code Status: full code   POLST: There is no POLST form on file for this patient (pre-hospital)  Discussion with family:     Anticipated Length of Stay:  Patient will be admitted on an Inpatient basis with an anticipated length of stay of   2 midnights  Justification for Hospital Stay:     Total Time for Visit, including Counseling / Coordination of Care: 1 hour  Greater than 50% of this total time spent on direct patient counseling and coordination of care  Chief Complaint:   Intractable nausea, vomiting, abdominal pain    History of Present Illness:    Nba Quinn is a 37 y o  male is being admitted for intractable nausea and vomiting along with abdominal pain  Pain is located in the epigastric region, he described the pain as severe dull achy radiating to lower part of his abdomen  Patient has been experiencing multiple episodes of nausea vomiting over the last few days, he has been unable to tolerate any food, fluid or medications  Nothing seems to make it better or worse  He denies any chest pain, shortness of breath, fever, chills or diarrhea  Review of Systems:    Review of Systems   Constitutional: Positive for appetite change and fatigue  HENT: Negative  Cardiovascular: Negative  Gastrointestinal: Positive for abdominal pain, nausea and vomiting  Musculoskeletal: Negative      Skin: Negative  Hematological: Negative  Psychiatric/Behavioral: Negative  Past Medical and Surgical History:     Past Medical History:   Diagnosis Date    Back pain     Cardiac disease     GERD (gastroesophageal reflux disease)     Hypertension     Neck pain        Past Surgical History:   Procedure Laterality Date    BACK SURGERY      HERNIA REPAIR      HERNIA REPAIR  2018    KNEE SURGERY      NECK SURGERY  2005       Meds/Allergies:    Prior to Admission medications    Medication Sig Start Date End Date Taking? Authorizing Provider   atenolol (TENORMIN) 50 mg tablet Take 100 mg by mouth daily     Historical Provider, MD   lisinopril (ZESTRIL) 20 mg tablet Take 20 mg by mouth daily    Historical Provider, MD   methadone (DOLOPHINE) 10 mg/mL oral concentrated solution Take 110 mg by mouth daily in the early morning    Historical Provider, MD   metoclopramide (REGLAN) 10 mg tablet Take 1 tablet (10 mg total) by mouth every 6 (six) hours as needed (nausea/vomiting)  Patient not taking: Reported on 9/19/2019 6/29/19   Harley Steen MD   metoclopramide (REGLAN) 5 mg tablet Take 1 tablet (5 mg total) by mouth 4 (four) times a day as needed (nausea) 9/8/19   Jeanmarie Blanco MD   omeprazole (PriLOSEC) 20 mg delayed release capsule Take 20 mg by mouth daily    Historical Provider, MD   pregabalin (LYRICA) 75 mg capsule Take 100 mg by mouth 2 (two) times a day     Historical Provider, MD   promethazine (PHENERGAN) 12 5 MG tablet Take 1 tablet (12 5 mg total) by mouth every 6 (six) hours as needed for nausea or vomiting 9/19/19   Adelaida Gonzalez PA-C   promethazine (PHENERGAN) 25 mg suppository Insert 1 suppository (25 mg total) into the rectum every 6 (six) hours as needed for nausea or vomiting 9/23/19   Adelaida Gonzalez PA-C     I have reviewed home medications with patient personally  Allergies:    Allergies   Allergen Reactions    Iodinated Diagnostic Agents     Ketorolac Tromethamine      Other reaction(s): Other (Please comment)  Pt goes into shock if given    Diphenhydramine Hives     Nothing major  "Can take it if I have to "    Penicillins Hives     Other reaction(s): Other (Please comment)  Trouble breathing    Shellfish Allergy     Varenicline Hives       Social History:     Marital Status: /Civil Union     Substance Use History:   Social History     Substance and Sexual Activity   Alcohol Use Not Currently     Social History     Tobacco Use   Smoking Status Current Every Day Smoker    Packs/day: 0 25   Smokeless Tobacco Never Used     Social History     Substance and Sexual Activity   Drug Use Never       Family History:    non-contributory    Physical Exam:     Vitals:   Blood Pressure: (!) 171/79 (09/25/19 0022)  Pulse: 70 (09/25/19 0022)  Temperature: 98 5 °F (36 9 °C) (09/24/19 1853)  Temp Source: Oral (09/24/19 1853)  Respirations: 20 (09/25/19 0022)  SpO2: 97 % (09/25/19 0022)    Physical Exam   Constitutional: He is oriented to person, place, and time  HENT:   Head: Normocephalic and atraumatic  Neck: Normal range of motion  Cardiovascular: Normal rate, regular rhythm, normal heart sounds and intact distal pulses  Pulmonary/Chest: Effort normal and breath sounds normal    Abdominal: Soft  Bowel sounds are normal  He exhibits no distension and no mass  There is tenderness  There is rebound  There is no guarding  No hernia  Musculoskeletal: Normal range of motion  He exhibits no edema  Neurological: He is alert and oriented to person, place, and time  Skin: Skin is warm and dry  He is not diaphoretic  Additional Data:     Lab Results: I have personally reviewed pertinent reports        Results from last 7 days   Lab Units 09/24/19 2010   WBC Thousand/uL 21 66*   HEMOGLOBIN g/dL 13 7   HEMATOCRIT % 43 2   PLATELETS Thousands/uL 271   NEUTROS PCT % 86*   LYMPHS PCT % 7*   MONOS PCT % 6   EOS PCT % 0     Results from last 7 days   Lab Units 09/24/19 2039   SODIUM mmol/L 134*   POTASSIUM mmol/L 4 3   CHLORIDE mmol/L 97*   CO2 mmol/L 24   BUN mg/dL 10   CREATININE mg/dL 0 67   ANION GAP mmol/L 13   CALCIUM mg/dL 8 5   ALBUMIN g/dL 2 9*   TOTAL BILIRUBIN mg/dL 0 80   ALK PHOS U/L 96   ALT U/L 21   AST U/L 29   GLUCOSE RANDOM mg/dL 151*     Results from last 7 days   Lab Units 09/25/19  0014   INR  1 10                   Imaging: I have personally reviewed pertinent reports  CT chest abdomen pelvis wo contrast   Final Result by Oliva Salvador DO (09/24 2124)   The above findings suggest development of pancreatitis with subsequent increase in the previous pseudocyst which extends into the stomach wall as described above  The increasing bilobed cystic structure in the stomach wall contributes to luminal    narrowing of the antrum of stomach which may explain the patient's vomiting  There is also secondary inflammatory changes of the 3rd and 4th portion of duodenum which cannot be further evaluated without oral contrast material        Exam otherwise stable  The study was marked in [facility] for immediate notification  Workstation performed: [id number]         IR consult    (Results Pending)       EKG, Pathology, and Other Studies Reviewed on Admission:   · EKG:     Allscripts / Epic Records Reviewed: Yes     ** Please Note: This note has been constructed using a voice recognition system   **

## 2019-09-25 NOTE — ASSESSMENT & PLAN NOTE
· Appears to be slightly elevated, CT scan shows development of pancreatitis  · Keep NPO  · IV fluids, IV pain med  · GI consult

## 2019-09-25 NOTE — ASSESSMENT & PLAN NOTE
· Pancreatic pseudocyst/cyst/gastric cyst  · Gastric cyst is increasing in size according to recent CT scan  · Patient was getting further workup outpatient with GI  · He was to plan for an EGD/EUS with FNA for cystic lesions/pancreas/CBD from abnormal MRI findings on 9/05/2019  ·  MRI/MRCP showed a cyst lesion at the surface of the body of the pancreas extending to the greater curvature of the stomach without associated PD dilatation and thought to be a possible pseudocyst; additional cystic lesions noted in the submucosal/intramural location in the stomach, additional cyst seen in the RUQ lateral to the CBD measuring 1 5 x 1 5 cm  · CA 19-9 normal  · GI consult  · IR consult

## 2019-09-25 NOTE — UTILIZATION REVIEW
Initial Clinical Review    Admission: Date/Time/Statement: Inpatient Admission Orders (From admission, onward)     Ordered        09/24/19 2150  Inpatient Admission  Once                   Orders Placed This Encounter   Procedures    Inpatient Admission     Standing Status:   Standing     Number of Occurrences:   1     Order Specific Question:   Admitting Physician     Answer:   Isa Londono [[id number]]     Order Specific Question:   Level of Care     Answer:   Med Surg [16]     Order Specific Question:   Estimated length of stay     Answer:   More than 2 Midnights     Order Specific Question:   Certification     Answer:   I certify that inpatient services are medically necessary for this patient for a duration of greater than two midnights  See H&P and MD Progress Notes for additional information about the patient's course of treatment  ED Arrival Information     Expected Arrival Acuity Means of Arrival Escorted By Service Admission Type    - 9/24/2019 18:43 Urgent Walk-In Self Hospitalist Urgent    Arrival Complaint    Vomiting        Chief Complaint   Patient presents with    Vomiting     pt states he has been vomiting since thursday, was seen and dx with "tumors or something in my intestines " pt states the nausea medicine is no longer working  denies fevers/diarrhea   Chest Pain     pt also c/o chest pain and "trouble breathing today "     Assessment/Plan: 37year old male to the ED from home with complaints of vomiting for 5 days and chest pain, difficulty breathing which started today  Admitted to inpatient for intractable vomiting with nausea, pancreatic cyst, elevated Lipase, and leukocytosis  Acute on chronic pancreatitis  He has been having multiple episodes of nausea and vomiting over the past few days at home, unable to tolerate food, fluids or medications  CT scan shows development of pancreatitis    Currently having OP workup for Pancreatic pseudocyst/cyst/gastric cyst   On CT scan, gastric cyst is increasing in size  Keep NPO  GI and iR consult  IV phenergan and Zofran as needed  He continues with pain in his epigastric region radiating to lower abdomen  IV fluids  ED Triage Vitals [09/24/19 1853]   Temperature Pulse Respirations Blood Pressure SpO2   98 5 °F (36 9 °C) 75 20 155/74 98 %      Temp Source Heart Rate Source Patient Position - Orthostatic VS BP Location FiO2 (%)   Oral Monitor Sitting Left arm --      Pain Score       8        Wt Readings from Last 1 Encounters:   09/19/19 134 kg (295 lb 8 oz)     Additional Vital Signs:   Date/Time Temp Pulse Resp BP SpO2 O2 Device Patient Position - Orthostatic VS   09/25/19 1026 98 2 °F (36 8 °C) 69 17 116/63 91 % None (Room air) Lying   09/25/19 0615  65 18 137/71 95 % None (Room air) Lying   09/25/19 0410  61 18 140/78 98 % None (Room air) Lying   09/25/19 0022  70 20 171/79Abnormal  97 % None (Room air) Lying   09/24/19 2127  75 20 168/89 95 % None (Room air) Lying   09/24/19 1853 98 5 °F (36 9 °C) 75 20 155/74 98 %       Pertinent Labs/Diagnostic Test Results:    CT C/A/P 9/24: Fatty changes in the pancreatic head and neck, as seen previously   Pancreatic body appears stable from prior exam   Pancreatic tail is atrophic   Previous cystic changes along the anterior surface of the pancreatic neck and pancreatic body   appears very similar, and is inseparable from the posterior wall the stomach with a questionable communication on image 2/69 as seen previously  Tigist Earl is increasing peripancreatic inflammatory change centered around the pancreatic tail extending   caudally, and surrounding the 3rd and 4th portions of duodenum   Development of pancreatitis with subsequent increase in the previous pseudocyst which extends into the stomach wall as described above   The increasing bilobed cystic structure in the stomach wall contributes to luminal   narrowing of the antrum of stomach which may explain the patient's vomiting  Tigist Earl is also secondary inflammatory changes of the 3rd and 4th portion of duodenum which cannot be further evaluated without oral contrast material      Results from last 7 days   Lab Units 09/25/19 0626 09/24/19 2010   WBC Thousand/uL 14 06* 21 66*   HEMOGLOBIN g/dL 12 6 13 7   HEMATOCRIT % 39 5 43 2   PLATELETS Thousands/uL 186 271   NEUTROS ABS Thousands/µL 10 77* 18 67*         Results from last 7 days   Lab Units 09/25/19 0626 09/24/19 2039   SODIUM mmol/L 138 134*   POTASSIUM mmol/L 3 7 4 3   CHLORIDE mmol/L 101 97*   CO2 mmol/L 25 24   ANION GAP mmol/L 12 13   BUN mg/dL 7 10   CREATININE mg/dL 0 59* 0 67   EGFR ml/min/1 73sq m 126 119   CALCIUM mg/dL 8 6 8 5   MAGNESIUM mg/dL 2 0  --    PHOSPHORUS mg/dL 3 4  --      Results from last 7 days   Lab Units 09/25/19 0626 09/24/19 2039   AST U/L 16 29   ALT U/L 18 21   ALK PHOS U/L 88 96   TOTAL PROTEIN g/dL 7 0 7 3   ALBUMIN g/dL 2 7* 2 9*   TOTAL BILIRUBIN mg/dL 0 50 0 80         Results from last 7 days   Lab Units 09/25/19  0626 09/24/19 2039   GLUCOSE RANDOM mg/dL 85 151*       Results from last 7 days   Lab Units 09/25/19  0014   PROTIME seconds 14 2   INR  1 10   PTT seconds 32     Results from last 7 days   Lab Units 09/24/19 2039   LIPASE u/L 457*     ED Treatment:   Medication Administration from 09/24/2019 1843 to 09/25/2019 1044       Date/Time Order Dose Route Action     09/24/2019 2007 OLANZapine (ZyPREXA ZYDIS) dispersible tablet 10 mg 10 mg Oral Given     09/24/2019 2011 sodium chloride 0 9 % bolus 1,000 mL 1,000 mL Intravenous New Bag     09/24/2019 2206 fentanyl citrate (PF) 100 MCG/2ML 75 mcg 75 mcg Intravenous Given     09/24/2019 2206 metoclopramide (REGLAN) injection 10 mg 10 mg Intravenous Given     09/25/2019 0813 pregabalin (LYRICA) capsule 100 mg 100 mg Oral Given     09/25/2019 0000 ondansetron (ZOFRAN) 8 mg in sodium chloride 0 9 % 50 mL IVPB 8 mg Intravenous New Bag     09/25/2019 0000 sodium chloride 0 9 % infusion 75 mL/hr Intravenous New Bag     09/25/2019 0623 HYDROmorphone (DILAUDID) injection 1 mg 1 mg Intravenous Given     09/25/2019 0000 HYDROmorphone (DILAUDID) injection 1 mg 1 mg Intravenous Given     09/25/2019 0131 pantoprazole (PROTONIX) injection 40 mg 40 mg Intravenous Given        Past Medical History:   Diagnosis Date    Back pain     Cardiac disease     GERD (gastroesophageal reflux disease)     Hypertension     Neck pain      Admitting Diagnosis: Vomiting [R11 10]  Chest pain [R07 9]  Age/Sex: 37 y o  male  Admission Orders:  UP and OOB  CBC, BMP, lipase  NPO     Current Facility-Administered Medications:  acetaminophen 650 mg Oral Q6H PRN   enoxaparin 40 mg Subcutaneous Daily   hydrALAZINE 10 mg Intravenous Q6H PRN   HYDROmorphone 0 5 mg Intravenous Q6H PRN   HYDROmorphone 1 mg Intravenous Q4H PRN x2   metoclopramide 10 mg Intravenous Q6H [address]   pantoprazole 40 mg Intravenous Q12H [address]   pregabalin 100 mg Oral BID   promethazine 12 5 mg Intravenous Q6H PRN   sodium chloride 75 mL/hr Intravenous Continuous       IP CONSULT TO GASTROENTEROLOGY    Network Utilization Review Department  Phone: [phone]; Fax [phone]  [email or web address]  ATTENTION: Please call with any questions or concerns to [phone]  and carefully listen to the prompts so that you are directed to the right person  Send all requests for admission clinical reviews, approved or denied determinations and any other requests to fax [phone]   All voicemails are confidential

## 2019-09-25 NOTE — ED PROVIDER NOTES
History  Chief Complaint   Patient presents with    Vomiting     pt states he has been vomiting since thursday, was seen and dx with "tumors or something in my intestines " pt states the nausea medicine is no longer working  denies fevers/diarrhea   Chest Pain     pt also c/o chest pain and "trouble breathing today "     18-year-old male presents to the emergency department for evaluation of chest and abdominal pain  Patient has history of pancreatitis with pseudocyst, was admitted for exacerbation earlier in the month, felt better, as plan for IR drainage of cyst on the 2nd of next month, but comes in today for worsening epigastric pain radiating to his chest and again persistent nausea and vomiting  No fevers, has some shortness of breath, no changes in bowel or bladder habits  Prior to Admission Medications   Prescriptions Last Dose Informant Patient Reported?  Taking?   atenolol (TENORMIN) 50 mg tablet  Self Yes No   Sig: Take 100 mg by mouth daily    lisinopril (ZESTRIL) 20 mg tablet  Self Yes No   Sig: Take 20 mg by mouth daily   methadone (DOLOPHINE) 10 mg/mL oral concentrated solution  Self Yes No   Sig: Take 110 mg by mouth daily in the early morning   metoclopramide (REGLAN) 10 mg tablet  Self No No   Sig: Take 1 tablet (10 mg total) by mouth every 6 (six) hours as needed (nausea/vomiting)   Patient not taking: Reported on 9/19/2019   metoclopramide (REGLAN) 5 mg tablet  Self No No   Sig: Take 1 tablet (5 mg total) by mouth 4 (four) times a day as needed (nausea)   omeprazole (PriLOSEC) 20 mg delayed release capsule  Self Yes No   Sig: Take 20 mg by mouth daily   pregabalin (LYRICA) 75 mg capsule  Self Yes No   Sig: Take 100 mg by mouth 2 (two) times a day    promethazine (PHENERGAN) 12 5 MG tablet   No No   Sig: Take 1 tablet (12 5 mg total) by mouth every 6 (six) hours as needed for nausea or vomiting   promethazine (PHENERGAN) 25 mg suppository   No No   Sig: Insert 1 suppository (25 mg total) into the rectum every 6 (six) hours as needed for nausea or vomiting      Facility-Administered Medications: None       Past Medical History:   Diagnosis Date    Back pain     Cardiac disease     GERD (gastroesophageal reflux disease)     Hypertension     Neck pain        Past Surgical History:   Procedure Laterality Date    BACK SURGERY      HERNIA REPAIR      HERNIA REPAIR  2018    KNEE SURGERY      NECK SURGERY  2005       Family History   Problem Relation Age of Onset    Diabetes Mother     Heart disease Mother     Cancer Father     No Known Problems Brother     Aneurysm Maternal Grandmother     No Known Problems Maternal Grandfather     No Known Problems Paternal Grandmother     No Known Problems Paternal Grandfather      I have reviewed and agree with the history as documented  Social History     Tobacco Use    Smoking status: Current Every Day Smoker     Packs/day: 0 25    Smokeless tobacco: Never Used   Substance Use Topics    Alcohol use: Not Currently    Drug use: Never        Review of Systems   Constitutional: Negative for appetite change, chills, fatigue and fever  HENT: Negative for sneezing and sore throat  Eyes: Negative for visual disturbance  Respiratory: Negative for cough, choking, chest tightness, shortness of breath and wheezing  Cardiovascular: Positive for chest pain  Negative for palpitations  Gastrointestinal: Positive for abdominal distention, nausea and vomiting  Negative for abdominal pain, constipation and diarrhea  Genitourinary: Negative for difficulty urinating and dysuria  Neurological: Negative for dizziness, weakness, light-headedness, numbness and headaches  All other systems reviewed and are negative  Physical Exam  Physical Exam   Constitutional: He is oriented to person, place, and time  He appears well-developed and well-nourished  No distress  HENT:   Head: Normocephalic and atraumatic     Mouth/Throat: Oropharynx is clear and moist    Eyes: Pupils are equal, round, and reactive to light  EOM are normal    Neck: No JVD present  No tracheal deviation present  Cardiovascular: Normal rate, regular rhythm, normal heart sounds and intact distal pulses  Exam reveals no gallop and no friction rub  No murmur heard  Pulmonary/Chest: Effort normal and breath sounds normal  No respiratory distress  He has no wheezes  He has no rales  Abdominal: Soft  Bowel sounds are normal  He exhibits no distension  There is generalized tenderness and tenderness in the epigastric area  There is no rebound and no guarding  Neurological: He is alert and oriented to person, place, and time  No cranial nerve deficit  He exhibits normal muscle tone  Skin: Skin is warm and dry  He is not diaphoretic  No pallor  Psychiatric: He has a normal mood and affect  His behavior is normal    Nursing note and vitals reviewed        Vital Signs  ED Triage Vitals [09/24/19 1853]   Temperature Pulse Respirations Blood Pressure SpO2   98 5 °F (36 9 °C) 75 20 155/74 98 %      Temp Source Heart Rate Source Patient Position - Orthostatic VS BP Location FiO2 (%)   Oral Monitor Sitting Left arm --      Pain Score       8           Vitals:    09/24/19 1853 09/24/19 2127   BP: 155/74 168/89   Pulse: 75 75   Patient Position - Orthostatic VS: Sitting Lying         Visual Acuity      ED Medications  Medications   sodium chloride 0 9 % bolus 1,000 mL (1,000 mL Intravenous New Bag 9/24/19 2011)   fentanyl citrate (PF) 100 MCG/2ML 75 mcg (has no administration in time range)   metoclopramide (REGLAN) injection 10 mg (has no administration in time range)   OLANZapine (ZyPREXA ZYDIS) dispersible tablet 10 mg (10 mg Oral Given 9/24/19 2007)       Diagnostic Studies  Results Reviewed     Procedure Component Value Units Date/Time    Comprehensive metabolic panel [[id number]]  (Abnormal) Collected:  09/24/19 2039    Lab Status:  Final result Specimen:  Blood from Arm, Left Updated: 09/24/19 2119     Sodium 134 mmol/L      Potassium 4 3 mmol/L      Chloride 97 mmol/L      CO2 24 mmol/L      ANION GAP 13 mmol/L      BUN 10 mg/dL      Creatinine 0 67 mg/dL      Glucose 151 mg/dL      Calcium 8 5 mg/dL      AST 29 U/L      ALT 21 U/L      Alkaline Phosphatase 96 U/L      Total Protein 7 3 g/dL      Albumin 2 9 g/dL      Total Bilirubin 0 80 mg/dL      eGFR 119 ml/min/1 73sq m     Narrative:       [place] guidelines for Chronic Kidney Disease (CKD):     Stage 1 with normal or high GFR (GFR > 90 mL/min/1 73 square meters)    Stage 2 Mild CKD (GFR = 60-89 mL/min/1 73 square meters)    Stage 3A Moderate CKD (GFR = 45-59 mL/min/1 73 square meters)    Stage 3B Moderate CKD (GFR = 30-44 mL/min/1 73 square meters)    Stage 4 Severe CKD (GFR = 15-29 mL/min/1 73 square meters)    Stage 5 End Stage CKD (GFR <15 mL/min/1 73 square meters)  Note: GFR calculation is accurate only with a steady state creatinine    Lipase [[id number]]  (Abnormal) Collected:  09/24/19 2039    Lab Status:  Final result Specimen:  Blood from Arm, Left Updated:  09/24/19 2119     Lipase 457 u/L     CBC and differential [[id number]]  (Abnormal) Collected:  09/24/19 2010    Lab Status:  Final result Specimen:  Blood from Arm, Left Updated:  09/24/19 2017     WBC 21 66 Thousand/uL      RBC 5 11 Million/uL      Hemoglobin 13 7 g/dL      Hematocrit 43 2 %      MCV 85 fL      MCH 26 8 pg      MCHC 31 7 g/dL      RDW 16 0 %      MPV 9 9 fL      Platelets 340 Thousands/uL      nRBC 0 /100 WBCs      Neutrophils Relative 86 %      Immat GRANS % 1 %      Lymphocytes Relative 7 %      Monocytes Relative 6 %      Eosinophils Relative 0 %      Basophils Relative 0 %      Neutrophils Absolute 18 67 Thousands/µL      Immature Grans Absolute 0 20 Thousand/uL      Lymphocytes Absolute 1 48 Thousands/µL      Monocytes Absolute 1 21 Thousand/µL      Eosinophils Absolute 0 06 Thousand/µL      Basophils Absolute 0 04 Thousands/µL                  CT chest abdomen pelvis wo contrast   Final Result by Viviane Claire DO (09/24 2124)   The above findings suggest development of pancreatitis with subsequent increase in the previous pseudocyst which extends into the stomach wall as described above  The increasing bilobed cystic structure in the stomach wall contributes to luminal    narrowing of the antrum of stomach which may explain the patient's vomiting  There is also secondary inflammatory changes of the 3rd and 4th portion of duodenum which cannot be further evaluated without oral contrast material        Exam otherwise stable  The study was marked in [facility] for immediate notification  Workstation performed: [id number]                    Procedures  Procedures       ED Course                               MDM  Number of Diagnoses or Management Options  Pancreatic pseudocyst:   Diagnosis management comments: 66-year-old male with abdominal pain, will pain meds fluids nausea meds, check labs scan, reassess  Disposition  Final diagnoses:   Pancreatic pseudocyst     Time reflects when diagnosis was documented in both MDM as applicable and the Disposition within this note     Time User Action Codes Description Comment    9/24/2019  9:50 PM Nara Thomas Add [K86 3] Pancreatic pseudocyst       ED Disposition     ED Disposition Condition Date/Time Comment    Admit Stable Tue Sep 24, 2019  9:49 PM Case was discussed with GUANACO and the patient's admission status was agreed to be Admission Status: inpatient status to the service of Dr Darwin Jasmine          Follow-up Information    None         Patient's Medications   Discharge Prescriptions    No medications on file     No discharge procedures on file      ED Provider  Electronically Signed by           Miguelangel Bravo MD  09/24/19 2246

## 2019-09-25 NOTE — CONSULTS
Consultation - 126 [facility]  [place] 37 y o  male MRN: [id number]  Unit/Bed#: -01 Encounter: [id number]        Consults    Reason for Consult / Principal Problem: Epigastric Pain, Nausea, Vomiting    HPI: Mor De Souza is a 36 yo M with a PMH of GERD, chronic methadone use, HTN, CAD, who presented due to persistent nausea, vomiting, inability to tolerate PO, and upper abdominal pain  This has been an ongoing issue for the past several months and has become progressively worse  He was initially hospitalized in [facility] and had a EGD on 7/9 which showed a medium amount of food residue in the stomach, localized congestion in the antrum, diffuse gastritis, and mucosal changes in the duodenum  During his admission here in September, he had abdominal imaging which showed a cyst at the body of the pancreas extending to the greater curvature of the stomach of unclear etiology as well as multiple cystic lesions in the submucosa/intramural location of the stomach without any pancreatic mass noted  CA 19-9 normal  A GES was trialed but he had nausea/vomiting during the test so it was not completed  He was seen in our office last week and was doing better but still with episodes of pain and nausea/vomiting  It is worrisome that he has lost about 50 lbs unintentionally in the past 2 months  He is tentatively scheduled for a EGD/EUS at [place] for October 2 but due to his acute worsening of symptoms and inability to tolerate meds including chronic methadone, he came to the ER  He is having loose small BMs, no melena or hematochezia  He is having pain now, it is exacerbated by eating  No nausea medications really help him including zofran, reglan, and phenergan  The pain medications do help the pain but only briefly      REVIEW OF SYSTEMS: Negative except for as stated above      Historical Information   Past Medical History:   Diagnosis Date    Back pain     Cardiac disease     GERD (gastroesophageal reflux disease)     Hypertension     Neck pain      Past Surgical History:   Procedure Laterality Date    BACK SURGERY      HERNIA REPAIR      HERNIA REPAIR  2018    KNEE SURGERY      NECK SURGERY  2005     Social History   Social History     Substance and Sexual Activity   Alcohol Use Not Currently     Social History     Substance and Sexual Activity   Drug Use Never     Social History     Tobacco Use   Smoking Status Current Every Day Smoker    Packs/day: 0 25   Smokeless Tobacco Never Used     Family History   Problem Relation Age of Onset    Diabetes Mother     Heart disease Mother     Cancer Father     No Known Problems Brother     Aneurysm Maternal Grandmother     No Known Problems Maternal Grandfather     No Known Problems Paternal Grandmother     No Known Problems Paternal Grandfather        Meds/Allergies     Medications Prior to Admission   Medication    atenolol (TENORMIN) 50 mg tablet    lisinopril (ZESTRIL) 20 mg tablet    methadone (DOLOPHINE) 10 mg/mL oral concentrated solution    metoclopramide (REGLAN) 10 mg tablet    metoclopramide (REGLAN) 5 mg tablet    omeprazole (PriLOSEC) 20 mg delayed release capsule    pregabalin (LYRICA) 75 mg capsule    promethazine (PHENERGAN) 12 5 MG tablet    promethazine (PHENERGAN) 25 mg suppository     Current Facility-Administered Medications   Medication Dose Route Frequency    acetaminophen (TYLENOL) tablet 650 mg  650 mg Oral Q6H PRN    enoxaparin (LOVENOX) subcutaneous injection 40 mg  40 mg Subcutaneous Daily    hydrALAZINE (APRESOLINE) injection 10 mg  10 mg Intravenous Q6H PRN    HYDROmorphone (DILAUDID) injection 0 5 mg  0 5 mg Intravenous Q6H PRN    HYDROmorphone (DILAUDID) injection 1 mg  1 mg Intravenous Q3H PRN    metoclopramide (REGLAN) injection 10 mg  10 mg Intravenous Q6H WANG    pantoprazole (PROTONIX) injection 40 mg  40 mg Intravenous Q12H [facility]    pregabalin (LYRICA) capsule 100 mg  100 mg Oral BID  promethazine (PHENERGAN) injection 12 5 mg  12 5 mg Intravenous Q6H PRN    sodium chloride 0 9 % infusion  75 mL/hr Intravenous Continuous       Allergies   Allergen Reactions    Iodinated Diagnostic Agents     Ketorolac Tromethamine      Other reaction(s): Other (Please comment)  Pt goes into shock if given    Diphenhydramine Hives     Nothing major  "Can take it if I have to "    Penicillins Hives     Other reaction(s): Other (Please comment)  Trouble breathing    Shellfish Allergy     Varenicline Hives           Objective     Blood pressure 154/88, pulse 79, temperature 99 9 °F (37 7 °C), resp  rate 20, SpO2 96 %  Intake/Output Summary (Last 24 hours) at 9/25/2019 1513  Last data filed at 9/25/2019 0022  Gross per 24 hour   Intake 1050 ml   Output    Net 1050 ml         PHYSICAL EXAM:      General Appearance:   Alert, oriented x3, no acute distress but appears uncomfortable    HEENT:   Normocephalic, atraumatic, anicteric      Neck:  Supple, symmetrical, trachea midline   Lungs:   Clear to auscultation bilaterally; no rales, rhonchi or wheezing; respirations unlabored    Heart[de-identified]   S1 and S2 normal; regular rate and rhythm; no murmur, rub, or gallop     Abdomen:   Obese, mild distention, BS active, (+) TTP in the upper abdomen   Rectal:   Deferred    Extremities:  No cyanosis, clubbing or edema    Pulses:  2+ and symmetric all extremities    Skin:  No jaundice or pallor, no rashes or lesions      Lab Results:   Results from last 7 days   Lab Units 09/25/19  0626   WBC Thousand/uL 14 06*   HEMOGLOBIN g/dL 12 6   HEMATOCRIT % 39 5   PLATELETS Thousands/uL 186   NEUTROS PCT % 76*   LYMPHS PCT % 15   MONOS PCT % 7   EOS PCT % 1     Results from last 7 days   Lab Units 09/25/19  0626   POTASSIUM mmol/L 3 7   CHLORIDE mmol/L 101   CO2 mmol/L 25   BUN mg/dL 7   CREATININE mg/dL 0 59*   CALCIUM mg/dL 8 6   ALK PHOS U/L 88   ALT U/L 18   AST U/L 16     Results from last 7 days   Lab Units 09/25/19  0014 INR  1 10     Results from last 7 days   Lab Units 09/24/19 2039   LIPASE u/L 457*       Imaging Studies: I have personally reviewed pertinent imaging studies  Ct Chest Abdomen Pelvis Wo Contrast  Result Date: 9/24/2019  Impression: The above findings suggest development of pancreatitis with subsequent increase in the previous pseudocyst which extends into the stomach wall as described above  The increasing bilobed cystic structure in the stomach wall contributes to luminal narrowing of the antrum of stomach which may explain the patient's vomiting  There is also secondary inflammatory changes of the 3rd and 4th portion of duodenum which cannot be further evaluated without oral contrast material  Exam otherwise stable         ASSESSMENT and PLAN:      Abdominal Pain  Elevated Lipase  Nausea/Vomiting  Weight Loss  Pancreatic Pseudocyst   - CT on admission without contrast with increasing pseudocyst size extending into stomach wall but less than 6 cm, cyst is causing luminal narrowing of antrum and there are also inflammatory changes noted in the duodenum as well as peripancreatic inflammatory changes around tail  - Lipase 457 on admission  - Unclear if this is pancreatitis v duodenitis given only mild elevation of lipase but regardless the cystic structure appears to be causing compression of the stomach and causing nausea/vomiting  - Will plan for EGD tomorrow to further evaluate stomach, antrum, and duodenum  - EGD/EUS tentatively scheduled for October 2, pending EGD findings may need to consider EUS sooner or possible cyst gastrostomy if the cyst appears to be causing significant compression on EGD  - Cancel IR consultation for cyst drainage as this is typically deferred until the cyst is matured and at least 6 cm  - NPO  - Pain control and antiemetics, will give scheduled reglan over the next 24 hours to promote stomach emptying prior to EGD  - Protonix 40 mg IV BID  - CA 19-9 negative last admission      The patient will be seen and examined by Dr Lona Medina

## 2019-09-25 NOTE — ASSESSMENT & PLAN NOTE
· On chronic methadone treatment for chronic neck and back pain  · Hold methadone during hospitalization while utilizing p r n   IV Dilaudid

## 2019-09-26 ENCOUNTER — ANESTHESIA EVENT (INPATIENT)
Dept: PERIOP | Facility: HOSPITAL | Age: 39
DRG: 663 | End: 2019-09-26
Payer: COMMERCIAL

## 2019-09-26 ENCOUNTER — APPOINTMENT (INPATIENT)
Dept: PERIOP | Facility: HOSPITAL | Age: 39
DRG: 663 | End: 2019-09-26
Payer: COMMERCIAL

## 2019-09-26 ENCOUNTER — ANESTHESIA (INPATIENT)
Dept: PERIOP | Facility: HOSPITAL | Age: 39
DRG: 663 | End: 2019-09-26
Payer: COMMERCIAL

## 2019-09-26 LAB
ANION GAP SERPL CALCULATED.3IONS-SCNC: 12 MMOL/L (ref 4–13)
BUN SERPL-MCNC: 8 MG/DL (ref 5–25)
CALCIUM SERPL-MCNC: 8.8 MG/DL (ref 8.3–10.1)
CHLORIDE SERPL-SCNC: 100 MMOL/L (ref 100–108)
CO2 SERPL-SCNC: 26 MMOL/L (ref 21–32)
CREAT SERPL-MCNC: 0.71 MG/DL (ref 0.6–1.3)
ERYTHROCYTE [DISTWIDTH] IN BLOOD BY AUTOMATED COUNT: 16 % (ref 11.6–15.1)
GFR SERPL CREATININE-BSD FRML MDRD: 117 ML/MIN/1.73SQ M
GLUCOSE SERPL-MCNC: 88 MG/DL (ref 65–140)
HCT VFR BLD AUTO: 39.4 % (ref 36.5–49.3)
HGB BLD-MCNC: 12.2 G/DL (ref 12–17)
LIPASE SERPL-CCNC: 136 U/L (ref 73–393)
MCH RBC QN AUTO: 26.8 PG (ref 26.8–34.3)
MCHC RBC AUTO-ENTMCNC: 31 G/DL (ref 31.4–37.4)
MCV RBC AUTO: 86 FL (ref 82–98)
PLATELET # BLD AUTO: 196 THOUSANDS/UL (ref 149–390)
PMV BLD AUTO: 10.3 FL (ref 8.9–12.7)
POTASSIUM SERPL-SCNC: 3.5 MMOL/L (ref 3.5–5.3)
RBC # BLD AUTO: 4.56 MILLION/UL (ref 3.88–5.62)
SODIUM SERPL-SCNC: 138 MMOL/L (ref 136–145)
WBC # BLD AUTO: 10.93 THOUSAND/UL (ref 4.31–10.16)

## 2019-09-26 PROCEDURE — C9113 INJ PANTOPRAZOLE SODIUM, VIA: HCPCS | Performed by: INTERNAL MEDICINE

## 2019-09-26 PROCEDURE — C9113 INJ PANTOPRAZOLE SODIUM, VIA: HCPCS | Performed by: PHYSICIAN ASSISTANT

## 2019-09-26 PROCEDURE — 43239 EGD BIOPSY SINGLE/MULTIPLE: CPT | Performed by: INTERNAL MEDICINE

## 2019-09-26 PROCEDURE — 80048 BASIC METABOLIC PNL TOTAL CA: CPT | Performed by: INTERNAL MEDICINE

## 2019-09-26 PROCEDURE — 0DB78ZX EXCISION OF STOMACH, PYLORUS, VIA NATURAL OR ARTIFICIAL OPENING ENDOSCOPIC, DIAGNOSTIC: ICD-10-PCS | Performed by: INTERNAL MEDICINE

## 2019-09-26 PROCEDURE — 85027 COMPLETE CBC AUTOMATED: CPT | Performed by: INTERNAL MEDICINE

## 2019-09-26 PROCEDURE — 88305 TISSUE EXAM BY PATHOLOGIST: CPT | Performed by: PATHOLOGY

## 2019-09-26 PROCEDURE — 88342 IMHCHEM/IMCYTCHM 1ST ANTB: CPT | Performed by: PATHOLOGY

## 2019-09-26 PROCEDURE — NC001 PR NO CHARGE

## 2019-09-26 PROCEDURE — 83690 ASSAY OF LIPASE: CPT | Performed by: INTERNAL MEDICINE

## 2019-09-26 PROCEDURE — 99232 SBSQ HOSP IP/OBS MODERATE 35: CPT | Performed by: INTERNAL MEDICINE

## 2019-09-26 RX ORDER — SUCCINYLCHOLINE/SOD CL,ISO/PF 100 MG/5ML
SYRINGE (ML) INTRAVENOUS AS NEEDED
Status: DISCONTINUED | OUTPATIENT
Start: 2019-09-26 | End: 2019-09-26 | Stop reason: SURG

## 2019-09-26 RX ORDER — ONDANSETRON 2 MG/ML
4 INJECTION INTRAMUSCULAR; INTRAVENOUS ONCE AS NEEDED
Status: DISCONTINUED | OUTPATIENT
Start: 2019-09-26 | End: 2019-09-26 | Stop reason: HOSPADM

## 2019-09-26 RX ORDER — LIDOCAINE HYDROCHLORIDE 10 MG/ML
INJECTION, SOLUTION INFILTRATION; PERINEURAL AS NEEDED
Status: DISCONTINUED | OUTPATIENT
Start: 2019-09-26 | End: 2019-09-26 | Stop reason: SURG

## 2019-09-26 RX ORDER — ONDANSETRON 2 MG/ML
INJECTION INTRAMUSCULAR; INTRAVENOUS AS NEEDED
Status: DISCONTINUED | OUTPATIENT
Start: 2019-09-26 | End: 2019-09-26 | Stop reason: SURG

## 2019-09-26 RX ORDER — MEPERIDINE HYDROCHLORIDE 50 MG/ML
12.5 INJECTION INTRAMUSCULAR; INTRAVENOUS; SUBCUTANEOUS ONCE AS NEEDED
Status: DISCONTINUED | OUTPATIENT
Start: 2019-09-26 | End: 2019-09-26 | Stop reason: HOSPADM

## 2019-09-26 RX ORDER — LABETALOL 20 MG/4 ML (5 MG/ML) INTRAVENOUS SYRINGE
AS NEEDED
Status: DISCONTINUED | OUTPATIENT
Start: 2019-09-26 | End: 2019-09-26 | Stop reason: SURG

## 2019-09-26 RX ORDER — METOCLOPRAMIDE HYDROCHLORIDE 5 MG/ML
10 INJECTION INTRAMUSCULAR; INTRAVENOUS ONCE AS NEEDED
Status: COMPLETED | OUTPATIENT
Start: 2019-09-26 | End: 2019-09-26

## 2019-09-26 RX ORDER — PROPOFOL 10 MG/ML
INJECTION, EMULSION INTRAVENOUS AS NEEDED
Status: DISCONTINUED | OUTPATIENT
Start: 2019-09-26 | End: 2019-09-26 | Stop reason: SURG

## 2019-09-26 RX ORDER — DEXAMETHASONE SODIUM PHOSPHATE 10 MG/ML
INJECTION, SOLUTION INTRAMUSCULAR; INTRAVENOUS AS NEEDED
Status: DISCONTINUED | OUTPATIENT
Start: 2019-09-26 | End: 2019-09-26 | Stop reason: SURG

## 2019-09-26 RX ORDER — METHADONE HYDROCHLORIDE 10 MG/ML
100 CONCENTRATE ORAL DAILY
Status: DISCONTINUED | OUTPATIENT
Start: 2019-09-26 | End: 2019-09-27 | Stop reason: HOSPADM

## 2019-09-26 RX ADMIN — SODIUM CHLORIDE 75 ML/HR: 0.9 INJECTION, SOLUTION INTRAVENOUS at 07:43

## 2019-09-26 RX ADMIN — METOCLOPRAMIDE 10 MG: 5 INJECTION, SOLUTION INTRAMUSCULAR; INTRAVENOUS at 15:36

## 2019-09-26 RX ADMIN — LABETALOL 20 MG/4 ML (5 MG/ML) INTRAVENOUS SYRINGE 10 MG: at 15:12

## 2019-09-26 RX ADMIN — HYDROMORPHONE HYDROCHLORIDE 1 MG: 1 INJECTION, SOLUTION INTRAMUSCULAR; INTRAVENOUS; SUBCUTANEOUS at 23:48

## 2019-09-26 RX ADMIN — PANTOPRAZOLE SODIUM 40 MG: 40 INJECTION, POWDER, FOR SOLUTION INTRAVENOUS at 08:14

## 2019-09-26 RX ADMIN — DEXAMETHASONE SODIUM PHOSPHATE 10 MG: 10 INJECTION, SOLUTION INTRAMUSCULAR; INTRAVENOUS at 15:04

## 2019-09-26 RX ADMIN — METOCLOPRAMIDE 10 MG: 5 INJECTION, SOLUTION INTRAMUSCULAR; INTRAVENOUS at 17:00

## 2019-09-26 RX ADMIN — LIDOCAINE HYDROCHLORIDE 50 MG: 10 INJECTION, SOLUTION INFILTRATION; PERINEURAL at 15:01

## 2019-09-26 RX ADMIN — METOCLOPRAMIDE 10 MG: 5 INJECTION, SOLUTION INTRAMUSCULAR; INTRAVENOUS at 11:34

## 2019-09-26 RX ADMIN — HYDROMORPHONE HYDROCHLORIDE 1 MG: 1 INJECTION, SOLUTION INTRAMUSCULAR; INTRAVENOUS; SUBCUTANEOUS at 16:57

## 2019-09-26 RX ADMIN — PANTOPRAZOLE SODIUM 40 MG: 40 INJECTION, POWDER, FOR SOLUTION INTRAVENOUS at 21:23

## 2019-09-26 RX ADMIN — ONDANSETRON 4 MG: 2 INJECTION INTRAMUSCULAR; INTRAVENOUS at 15:01

## 2019-09-26 RX ADMIN — PREGABALIN 100 MG: 100 CAPSULE ORAL at 08:13

## 2019-09-26 RX ADMIN — METOCLOPRAMIDE 10 MG: 5 INJECTION, SOLUTION INTRAMUSCULAR; INTRAVENOUS at 05:20

## 2019-09-26 RX ADMIN — HYDROMORPHONE HYDROCHLORIDE 1 MG: 1 INJECTION, SOLUTION INTRAMUSCULAR; INTRAVENOUS; SUBCUTANEOUS at 20:25

## 2019-09-26 RX ADMIN — METHADONE HYDROCHLORIDE 100 MG: 10 CONCENTRATE ORAL at 08:19

## 2019-09-26 RX ADMIN — HYDROMORPHONE HYDROCHLORIDE 1 MG: 1 INJECTION, SOLUTION INTRAMUSCULAR; INTRAVENOUS; SUBCUTANEOUS at 05:20

## 2019-09-26 RX ADMIN — METOCLOPRAMIDE 10 MG: 5 INJECTION, SOLUTION INTRAMUSCULAR; INTRAVENOUS at 23:49

## 2019-09-26 RX ADMIN — HYDROMORPHONE HYDROCHLORIDE 1 MG: 1 INJECTION, SOLUTION INTRAMUSCULAR; INTRAVENOUS; SUBCUTANEOUS at 02:07

## 2019-09-26 RX ADMIN — Medication 100 MG: at 15:01

## 2019-09-26 RX ADMIN — ONDANSETRON 4 MG: 2 INJECTION INTRAMUSCULAR; INTRAVENOUS at 15:04

## 2019-09-26 RX ADMIN — HYDROMORPHONE HYDROCHLORIDE 1 MG: 1 INJECTION, SOLUTION INTRAMUSCULAR; INTRAVENOUS; SUBCUTANEOUS at 11:34

## 2019-09-26 RX ADMIN — PREGABALIN 100 MG: 100 CAPSULE ORAL at 17:00

## 2019-09-26 RX ADMIN — METOCLOPRAMIDE 10 MG: 5 INJECTION, SOLUTION INTRAMUSCULAR; INTRAVENOUS at 00:50

## 2019-09-26 RX ADMIN — PROPOFOL 200 MG: 10 INJECTION, EMULSION INTRAVENOUS at 15:01

## 2019-09-26 RX ADMIN — HYDROMORPHONE HYDROCHLORIDE 1 MG: 1 INJECTION, SOLUTION INTRAMUSCULAR; INTRAVENOUS; SUBCUTANEOUS at 08:14

## 2019-09-26 NOTE — ANESTHESIA POSTPROCEDURE EVALUATION
Post-Op Assessment Note    CV Status:  Stable  Pain Score: 0    Pain management: adequate     Mental Status:  Alert and awake   Hydration Status:  Euvolemic   PONV Controlled:  Controlled   Airway Patency:  Patent   Post Op Vitals Reviewed: Yes      Staff: CRNA           /77 (09/26/19 1521)    Temp 98 5 °F (36 9 °C) (09/26/19 1521)    Pulse 84 (09/26/19 1521)   Resp (!) 30 (09/26/19 1521)    SpO2 92 % (09/26/19 1521)

## 2019-09-26 NOTE — PLAN OF CARE
Problem: Potential for Falls  Goal: Patient will remain free of falls  Description  INTERVENTIONS:  - Assess patient frequently for physical needs  -  Identify cognitive and physical deficits and behaviors that affect risk of falls    -  [place] fall precautions as indicated by assessment   - Educate patient/family on patient safety including physical limitations  - Instruct patient to call for assistance with activity based on assessment  - Modify environment to reduce risk of injury  - Consider OT/PT consult to assist with strengthening/mobility  Outcome: Progressing     Problem: PAIN - ADULT  Goal: Verbalizes/displays adequate comfort level or baseline comfort level  Description  Interventions:  - Encourage patient to monitor pain and request assistance  - Assess pain using appropriate pain scale  - Administer analgesics based on type and severity of pain and evaluate response  - Implement non-pharmacological measures as appropriate and evaluate response  - Consider cultural and social influences on pain and pain management  - Notify physician/advanced practitioner if interventions unsuccessful or patient reports new pain  Outcome: Progressing     Problem: INFECTION - ADULT  Goal: Absence or prevention of progression during hospitalization  Description  INTERVENTIONS:  - Assess and monitor for signs and symptoms of infection  - Monitor lab/diagnostic results  - Monitor all insertion sites, i e  indwelling lines, tubes, and drains  - Monitor endotracheal if appropriate and nasal secretions for changes in amount and color  - [place] appropriate cooling/warming therapies per order  - Administer medications as ordered  - Instruct and encourage patient and family to use good hand hygiene technique  - Identify and instruct in appropriate isolation precautions for identified infection/condition  Outcome: Progressing  Goal: Absence of fever/infection during neutropenic period  Description  INTERVENTIONS:  - Monitor WBC    Outcome: Progressing     Problem: SAFETY ADULT  Goal: Maintain or return to baseline ADL function  Description  INTERVENTIONS:  -  Assess patient's ability to carry out ADLs; assess patient's baseline for ADL function and identify physical deficits which impact ability to perform ADLs (bathing, care of mouth/teeth, toileting, grooming, dressing, etc )  - Assess/evaluate cause of self-care deficits   - Assess range of motion  - Assess patient's mobility; develop plan if impaired  - Assess patient's need for assistive devices and provide as appropriate  - Encourage maximum independence but intervene and supervise when necessary  - Involve family in performance of ADLs  - Assess for home care needs following discharge   - Consider OT consult to assist with ADL evaluation and planning for discharge  - Provide patient education as appropriate  Outcome: Progressing  Goal: Maintain or return mobility status to optimal level  Description  INTERVENTIONS:  - Assess patient's baseline mobility status (ambulation, transfers, stairs, etc )    - Identify cognitive and physical deficits and behaviors that affect mobility  - Identify mobility aids required to assist with transfers and/or ambulation (gait belt, sit-to-stand, lift, walker, cane, etc )  - [place] fall precautions as indicated by assessment  - Record patient progress and toleration of activity level on Mobility SBAR; progress patient to next Phase/Stage  - Instruct patient to call for assistance with activity based on assessment  - Consider rehabilitation consult to assist with strengthening/weightbearing, etc   Outcome: Progressing     Problem: DISCHARGE PLANNING  Goal: Discharge to home or other facility with appropriate resources  Description  INTERVENTIONS:  - Identify barriers to discharge w/patient and caregiver  - Arrange for needed discharge resources and transportation as appropriate  - Identify discharge learning needs (meds, wound care, etc )  - Arrange for interpretive services to assist at discharge as needed  - Refer to Case Management Department for coordinating discharge planning if the patient needs post-hospital services based on physician/advanced practitioner order or complex needs related to functional status, cognitive ability, or social support system  Outcome: Progressing     Problem: Knowledge Deficit  Goal: Patient/family/caregiver demonstrates understanding of disease process, treatment plan, medications, and discharge instructions  Description  Complete learning assessment and assess knowledge base    Interventions:  - Provide teaching at level of understanding  - Provide teaching via preferred learning methods  Outcome: Progressing

## 2019-09-26 NOTE — PLAN OF CARE
Problem: Potential for Falls  Goal: Patient will remain free of falls  Description  INTERVENTIONS:  - Assess patient frequently for physical needs  -  Identify cognitive and physical deficits and behaviors that affect risk of falls    -  [place] fall precautions as indicated by assessment   - Educate patient/family on patient safety including physical limitations  - Instruct patient to call for assistance with activity based on assessment  - Modify environment to reduce risk of injury  - Consider OT/PT consult to assist with strengthening/mobility  Outcome: Progressing     Problem: PAIN - ADULT  Goal: Verbalizes/displays adequate comfort level or baseline comfort level  Description  Interventions:  - Encourage patient to monitor pain and request assistance  - Assess pain using appropriate pain scale  - Administer analgesics based on type and severity of pain and evaluate response  - Implement non-pharmacological measures as appropriate and evaluate response  - Consider cultural and social influences on pain and pain management  - Notify physician/advanced practitioner if interventions unsuccessful or patient reports new pain  Outcome: Progressing     Problem: INFECTION - ADULT  Goal: Absence or prevention of progression during hospitalization  Description  INTERVENTIONS:  - Assess and monitor for signs and symptoms of infection  - Monitor lab/diagnostic results  - Monitor all insertion sites, i e  indwelling lines, tubes, and drains  - Monitor endotracheal if appropriate and nasal secretions for changes in amount and color  - [place] appropriate cooling/warming therapies per order  - Administer medications as ordered  - Instruct and encourage patient and family to use good hand hygiene technique  - Identify and instruct in appropriate isolation precautions for identified infection/condition  Outcome: Progressing  Goal: Absence of fever/infection during neutropenic period  Description  INTERVENTIONS:  - Monitor WBC    Outcome: Progressing     Problem: SAFETY ADULT  Goal: Maintain or return to baseline ADL function  Description  INTERVENTIONS:  -  Assess patient's ability to carry out ADLs; assess patient's baseline for ADL function and identify physical deficits which impact ability to perform ADLs (bathing, care of mouth/teeth, toileting, grooming, dressing, etc )  - Assess/evaluate cause of self-care deficits   - Assess range of motion  - Assess patient's mobility; develop plan if impaired  - Assess patient's need for assistive devices and provide as appropriate  - Encourage maximum independence but intervene and supervise when necessary  - Involve family in performance of ADLs  - Assess for home care needs following discharge   - Consider OT consult to assist with ADL evaluation and planning for discharge  - Provide patient education as appropriate  Outcome: Progressing  Goal: Maintain or return mobility status to optimal level  Description  INTERVENTIONS:  - Assess patient's baseline mobility status (ambulation, transfers, stairs, etc )    - Identify cognitive and physical deficits and behaviors that affect mobility  - Identify mobility aids required to assist with transfers and/or ambulation (gait belt, sit-to-stand, lift, walker, cane, etc )  - [place] fall precautions as indicated by assessment  - Record patient progress and toleration of activity level on Mobility SBAR; progress patient to next Phase/Stage  - Instruct patient to call for assistance with activity based on assessment  - Consider rehabilitation consult to assist with strengthening/weightbearing, etc   Outcome: Progressing     Problem: DISCHARGE PLANNING  Goal: Discharge to home or other facility with appropriate resources  Description  INTERVENTIONS:  - Identify barriers to discharge w/patient and caregiver  - Arrange for needed discharge resources and transportation as appropriate  - Identify discharge learning needs (meds, wound care, etc )  - Arrange for interpretive services to assist at discharge as needed  - Refer to Case Management Department for coordinating discharge planning if the patient needs post-hospital services based on physician/advanced practitioner order or complex needs related to functional status, cognitive ability, or social support system  Outcome: Progressing     Problem: Knowledge Deficit  Goal: Patient/family/caregiver demonstrates understanding of disease process, treatment plan, medications, and discharge instructions  Description  Complete learning assessment and assess knowledge base    Interventions:  - Provide teaching at level of understanding  - Provide teaching via preferred learning methods  Outcome: Progressing

## 2019-09-26 NOTE — ANESTHESIA PREPROCEDURE EVALUATION
Review of Systems/Medical History  Patient summary reviewed  Chart reviewed  No history of anesthetic complications     Cardiovascular  EKG reviewed, Exercise tolerance (METS): >4,  Hypertension controlled, Past MI ,    Pulmonary       GI/Hepatic  Dysphagia,   GERD poorly controlled, Intestinal obstruction, Pancreatic problem,             Endo/Other    Obesity  morbid obesity   GYN       Hematology   Musculoskeletal       Neurology   Psychology     Chronic opioid dependence Chronic pain,            Physical Exam    Airway    Mallampati score: III  TM Distance: >3 FB  Neck ROM: full     Dental   upper dentures,     Cardiovascular      Pulmonary      Other Findings        Anesthesia Plan  ASA Score- 3     Anesthesia Type- general with ASA Monitors  Additional Monitors:   Airway Plan: ETT  Plan Factors-    Induction- intravenous and rapid sequence induction  Postoperative Plan- Plan for postoperative opioid use  Informed Consent- Anesthetic plan and risks discussed with patient  I personally reviewed this patient with the CRNA  Discussed and agreed on the Anesthesia Plan with the CRNA  Gonzalez Sánchez

## 2019-09-26 NOTE — NURSING NOTE
The patient's IV access became occludded and a new site was needed  An ICU RN came to the room with an ultrasound to place a line with no success  The supervisor then came to the bedside with the ultrasound to place a line and was successful, however IV fluids are unable to run through this site due to a valve in the vein  Another attempt by the supervisor and ICU RN were made to place an IV  There was no success  SLIM was made aware and placed a consult to critical care for placement of a PICC line

## 2019-09-26 NOTE — QUICK NOTE
Return call from [facility] this am confirms patient picks up daily methadone 100 mg, with takeaway on Sundays  Patient will need to bring dc paperwork on next visit  Will redose this am if able to tolerate po

## 2019-09-27 VITALS
TEMPERATURE: 98.6 F | DIASTOLIC BLOOD PRESSURE: 88 MMHG | RESPIRATION RATE: 18 BRPM | HEART RATE: 77 BPM | OXYGEN SATURATION: 96 % | SYSTOLIC BLOOD PRESSURE: 142 MMHG

## 2019-09-27 PROBLEM — D72.829 LEUKOCYTOSIS: Status: RESOLVED | Noted: 2019-09-25 | Resolved: 2019-09-27

## 2019-09-27 PROBLEM — R11.2 INTRACTABLE VOMITING WITH NAUSEA: Status: RESOLVED | Noted: 2019-09-25 | Resolved: 2019-09-27

## 2019-09-27 PROBLEM — K85.90 ACUTE RECURRENT PANCREATITIS: Status: RESOLVED | Noted: 2019-09-04 | Resolved: 2019-09-27

## 2019-09-27 PROBLEM — K29.80 ACUTE DUODENITIS: Status: RESOLVED | Noted: 2019-09-25 | Resolved: 2019-09-27

## 2019-09-27 PROBLEM — K29.70 GASTRITIS: Status: RESOLVED | Noted: 2019-09-04 | Resolved: 2019-09-27

## 2019-09-27 PROCEDURE — 99238 HOSP IP/OBS DSCHRG MGMT 30/<: CPT | Performed by: INTERNAL MEDICINE

## 2019-09-27 PROCEDURE — 99232 SBSQ HOSP IP/OBS MODERATE 35: CPT | Performed by: INTERNAL MEDICINE

## 2019-09-27 RX ORDER — METOCLOPRAMIDE 5 MG/1
5 TABLET ORAL 4 TIMES DAILY PRN
Qty: 30 TABLET | Refills: 0 | Status: SHIPPED | OUTPATIENT
Start: 2019-09-27 | End: 2019-10-14 | Stop reason: SDUPTHER

## 2019-09-27 RX ORDER — ATENOLOL 50 MG/1
50 TABLET ORAL DAILY
Status: DISCONTINUED | OUTPATIENT
Start: 2019-09-27 | End: 2019-09-27 | Stop reason: HOSPADM

## 2019-09-27 RX ORDER — PANTOPRAZOLE SODIUM 40 MG/1
40 TABLET, DELAYED RELEASE ORAL
Status: DISCONTINUED | OUTPATIENT
Start: 2019-09-27 | End: 2019-09-27 | Stop reason: HOSPADM

## 2019-09-27 RX ADMIN — METOCLOPRAMIDE 10 MG: 5 INJECTION, SOLUTION INTRAMUSCULAR; INTRAVENOUS at 05:29

## 2019-09-27 RX ADMIN — PANTOPRAZOLE SODIUM 40 MG: 40 TABLET, DELAYED RELEASE ORAL at 08:47

## 2019-09-27 RX ADMIN — ATENOLOL 50 MG: 50 TABLET ORAL at 10:12

## 2019-09-27 RX ADMIN — HYDROMORPHONE HYDROCHLORIDE 1 MG: 1 INJECTION, SOLUTION INTRAMUSCULAR; INTRAVENOUS; SUBCUTANEOUS at 10:00

## 2019-09-27 RX ADMIN — PROMETHAZINE HYDROCHLORIDE 12.5 MG: 25 INJECTION INTRAMUSCULAR; INTRAVENOUS at 03:02

## 2019-09-27 RX ADMIN — HYDROMORPHONE HYDROCHLORIDE 0.5 MG: 1 INJECTION, SOLUTION INTRAMUSCULAR; INTRAVENOUS; SUBCUTANEOUS at 08:51

## 2019-09-27 RX ADMIN — HYDROMORPHONE HYDROCHLORIDE 1 MG: 1 INJECTION, SOLUTION INTRAMUSCULAR; INTRAVENOUS; SUBCUTANEOUS at 03:01

## 2019-09-27 RX ADMIN — HYDROMORPHONE HYDROCHLORIDE 1 MG: 1 INJECTION, SOLUTION INTRAMUSCULAR; INTRAVENOUS; SUBCUTANEOUS at 06:21

## 2019-09-27 RX ADMIN — SODIUM CHLORIDE 75 ML/HR: 0.9 INJECTION, SOLUTION INTRAVENOUS at 05:29

## 2019-09-27 RX ADMIN — PREGABALIN 100 MG: 100 CAPSULE ORAL at 08:47

## 2019-09-27 RX ADMIN — METHADONE HYDROCHLORIDE 100 MG: 10 CONCENTRATE ORAL at 08:48

## 2019-09-27 NOTE — UTILIZATION REVIEW
Notification of Discharge  This is a Notification of Discharge from our facility [address]  Please be advised that this patient has been discharge from our facility  Below you will find the admission and discharge date and time including the patients disposition  PRESENTATION DATE: 9/24/2019  7:16 PM  OBS ADMISSION DATE:   IP ADMISSION DATE: 9/24/19 2150   DISCHARGE DATE: 9/27/2019 10:42 AM  DISPOSITION: Home/Self Care Home/Self Care   Admission Orders listed below:  Admission Orders (From admission, onward)     Ordered        09/24/19 2150  Inpatient Admission  Once                   Please contact the UR Department if additional information is required to close this patient's authorization/case  [facility] Utilization Review Department  Phone: [phone]; Fax [phone]  [email or web address]  ATTENTION: Please call with any questions or concerns to [phone]  and carefully listen to the prompts so that you are directed to the right person  Send all requests for admission clinical reviews, approved or denied determinations and any other requests to fax [phone]   All voicemails are confidential

## 2019-09-27 NOTE — DISCHARGE INSTR - AVS FIRST PAGE
As instructed by GI    Start her Reglan at 5 mg 4 times a day if you are not noticing affected may increase to 10 mg 4 times a day

## 2019-09-27 NOTE — SOCIAL WORK
CM contacted [facility] outpatient CM and left vm providing an update regarding discharge today

## 2019-09-27 NOTE — ASSESSMENT & PLAN NOTE
Ct scan of abdomen and pelvis reveals  development of pancreatitis with subsequent increase in the previous pseudocyst which extends into the stomach wall as described above   The increasing bilobed cystic structure in the stomach wall contributes to luminal narrowing of the antrum of stomach which may explain the patient's vomiting  Fayne Footman is also secondary inflammatory changes of the 3rd and 4th portion of duodenum which cannot be further evaluated without oral contrast material    · Convert PPI to PO in the am  · Advance diet  · Discharge if stable to have f/u in [place] next week , already scheduled

## 2019-09-27 NOTE — ASSESSMENT & PLAN NOTE
· On chronic methadone treatment for chronic neck and back pain, confirmed dosing of 100 mg daily from [facility]

## 2019-09-27 NOTE — PROGRESS NOTES
GI Progress Note - Mary Jane Cross 37 y o  male MRN: [id number]    Unit/Bed#: -01 Encounter: [id number]    Subjective: Mr Chris Zhou is feeling well this morning  He has been able to tolerate liquids and cream of wheat  He has mild upper abdominal soreness but no significant pain and no further vomiting  He had 2 bowel movements yesterday  Objective:     Vitals: Blood pressure 142/88, pulse 77, temperature 98 6 °F (37 °C), resp  rate 18, SpO2 96 %  ,There is no height or weight on file to calculate BMI  Intake/Output Summary (Last 24 hours) at 9/27/2019 0948  Last data filed at 9/27/2019 0857  Gross per 24 hour   Intake 1560 ml   Output    Net 1560 ml       Physical Exam:     General Appearance: Alert, oriented x3, no acute distress  Lungs: Clear to auscultation bilaterally, no respiratory distress  Heart: RRR, no murmur  Abdomen: Obese, non-distended, bowel sounds active, NTTP  Extremities: No cyanosis, edema    Invasive Devices     Peripheral Intravenous Line            Peripheral IV 09/26/19 Proximal;Right;Ventral (anterior) Forearm 1 day    Peripheral IV 09/26/19 Right Forearm 1 day                Lab Results:  Results from last 7 days   Lab Units 09/26/19  0500 09/25/19  0626   WBC Thousand/uL 10 93* 14 06*   HEMOGLOBIN g/dL 12 2 12 6   HEMATOCRIT % 39 4 39 5   PLATELETS Thousands/uL 196 186   NEUTROS PCT %  --  76*   LYMPHS PCT %  --  15   MONOS PCT %  --  7   EOS PCT %  --  1     Results from last 7 days   Lab Units 09/26/19  0500 09/25/19  0626   POTASSIUM mmol/L 3 5 3 7   CHLORIDE mmol/L 100 101   CO2 mmol/L 26 25   BUN mg/dL 8 7   CREATININE mg/dL 0 71 0 59*   CALCIUM mg/dL 8 8 8 6   ALK PHOS U/L  --  88   ALT U/L  --  18   AST U/L  --  16     Results from last 7 days   Lab Units 09/25/19  0014   INR  1 10     Results from last 7 days   Lab Units 09/26/19  0500   LIPASE u/L 136       Imaging Studies: I have personally reviewed pertinent imaging studies        Ct Chest Abdomen Pelvis Wo Contrast  Result Date: 9/24/2019  Impression: The above findings suggest development of pancreatitis with subsequent increase in the previous pseudocyst which extends into the stomach wall as described above  The increasing bilobed cystic structure in the stomach wall contributes to luminal narrowing of the antrum of stomach which may explain the patient's vomiting  There is also secondary inflammatory changes of the 3rd and 4th portion of duodenum which cannot be further evaluated without oral contrast material  Exam otherwise stable  The study was marked in [facility] for immediate notification  Workstation performed: [id number]     Us Right Upper Quadrant With Liver Dopplers  Result Date: 9/26/2019  Impression: Hepatomegaly and hepatic steatosis  Normal gallbladder  Workstation performed: [id number]       Assessment and Plan:     Pancreatic Pseudocyst  Nausea/Vomiting  Weight Loss  - CT on admission without contrast with increasing pseudocyst size extending into stomach wall but less than 6 cm, cyst causing luminal narrowing of antrum  - S/P EGD 9/26 showing reflux esophagitis, moderate nonerosive gastritis, and extrinsic compression from pancreatic pseudocyst in the antrum likely causing intermittent obstructive symptoms  - EUS scheduled for October 2, will discuss with performing provider regarding cyst gastrostomy during EUS given he is symptomatic requiring multiple hospitalizations and has lost about 50 lbs in the past several months due to inability to take PO  - Recommend liquid/soft diet until EUS next week  - Recommend reglan 5 mg ACHS standing at home, he can increase this to 10 mg ACHS if needed pending symptoms at least until his EUS  - Protonix 40 mg PO BID  - CA 19-9 negative last admission       The patient will be seen by Dr Pantera Gross

## 2019-09-27 NOTE — PROGRESS NOTES
Progress Note - Edouard Andrade 1980, 37 y o  male MRN: [id number]    Unit/Bed#: -01 Encounter: [id number]    Primary Care Provider: An Almeida DO   Date and time admitted to hospital: 9/24/2019  7:16 PM        * Acute recurrent pancreatitis  Assessment & Plan  · Lipase Appears to be improved  IV fluids, IV pain med  · GI did EGD today, positive rishi ulcer and some gastritis  Definite compression of the antrum from pancreatic cyst   Plan to stabilize advance diet and obtain f/u for EUS next week at previously scheduled appointment  · Patient tolerated Clears, will advance diet to full liquid for the am     Intractable vomiting with nausea  Assessment & Plan  Ct scan of abdomen and pelvis reveals  development of pancreatitis with subsequent increase in the previous pseudocyst which extends into the stomach wall as described above   The increasing bilobed cystic structure in the stomach wall contributes to luminal narrowing of the antrum of stomach which may explain the patient's vomiting  Elias Charm is also secondary inflammatory changes of the 3rd and 4th portion of duodenum which cannot be further evaluated without oral contrast material    · Convert PPI to PO in the am  · Advance diet  · Discharge if stable to have f/u in [place] next week , already scheduled  Acute duodenitis  Assessment & Plan  Continue PPI  Patient did have some diarrhea after CL diet but no pain      Gastritis  Assessment & Plan  · IV Protonix can be converted to po in the am   · Advancing diet  · Continue PPI    Pancreatic cyst  Assessment & Plan  · Pancreatic pseudocyst/cyst/gastric cyst  · Gastric cyst is increasing in size according to recent CT scan  · Patient was getting further workup outpatient with GI  · He was to plan for an EGD/EUS with FNA for cystic lesions/pancreas/CBD from abnormal MRI findings on 9/05/2019  ·  MRI/MRCP showed a cyst lesion at the surface of the body of the pancreas extending to the greater curvature of the stomach without associated PD dilatation and thought to be a possible pseudocyst; additional cystic lesions noted in the submucosal/intramural location in the stomach, additional cyst seen in the RUQ lateral to the CBD measuring 1 5 x 1 5 cm  · CA 19-9 normal  · GI did EGD and confirmed  EUS next week to evaluate  ·           Hypertension  Assessment & Plan  Consider resuming home meds will asses    Opiate dependence, continuous (HCC)  Assessment & Plan  · On chronic methadone treatment for chronic neck and back pain, confirmed dosing of 100 mg daily from [address] [place] [address]  · Patient is down to few cigarettes daily  · Declined nicotine patch    Morbid obesity ([facility] [address] )  Assessment & Plan  Life style modifications counseled  Leukocytosis  Assessment & Plan  · WBC is 21 6 early on now normalized  · Likely due to intractable nausea vomiting  · Continue to monitor        VTE Pharmacologic Prophylaxis:   Pharmacologic: Enoxaparin (Lovenox)  Mechanical: Mechanical VTE prophylaxis in place  Patient Centered Rounds: updated nursing on walking rounds  Discussions with Specialists or Other Care Team Provider: GI  Education and Discussions with Family / Patient: none at bedside  Time Spent for Care: 30 minutes  If More than 50% of total time spent on counseling and coordination of care as described above indicate yes or no and described the counseling and coordination:none    Current Length of Stay: 2 day(s)  Current Patient Status: Inpatient   Certification Statement: The patient will continue to require additional inpatient hospital stay due to advancing diet    Discharge Plan: possible next 24 to 48 hours  Code Status: Level 1 - Full Code    Subjective:  Patient is doing well status post procedure today sitting on the edge of the bed he describes no acute pain at this time  He did tolerate liquids but then developed acute rapidly transiting soft stools  Patient describes no further nausea and has not vomited  Objective:   Vitals:   Temp (24hrs), Av 5 °F (36 9 °C), Min:98 1 °F (36 7 °C), Max:98 8 °F (37 1 °C)    Temp:  [98 1 °F (36 7 °C)-98 8 °F (37 1 °C)] 98 6 °F (37 °C)  HR:  [66-90] 69  Resp:  [15-30] 18  BP: (130-160)/(65-84) 132/79  SpO2:  [88 %-98 %] 93 %  There is no height or weight on file to calculate BMI  Input and Output Summary (last 24 hours):        Intake/Output Summary (Last 24 hours) at 2019 0027  Last data filed at 2019 2247  Gross per 24 hour   Intake 260 ml   Output    Net 260 ml       Physical Exam:  General well-developed obese male in no acute distress normocephalic atraumatic pupils equal round and reactive to light extraocular muscles intact mucous membranes are moist neck is supple there is no JVD no lymph nodes no carotid bruits chest is decreased but clear to auscultation is no rhonchi rales or wheezes  Cardiovascular regular rate rhythm positive S1 and S2 heard appreciate an S3-S4 murmur or gallop  Abdomen is slightly distended but nontender soft positive bowel sounds no hepatosplenomegaly that can be noted  Extremities no clubbing cyanosis or edema  Neurologically patient awake alert oriented cranial nerves 2-12 intact    Physical Exam    Additional Data:   Labs:  Results from last 7 days   Lab Units 19  0500 19  0626   WBC Thousand/uL 10 93* 14 06*   HEMOGLOBIN g/dL 12 2 12 6   HEMATOCRIT % 39 4 39 5   PLATELETS Thousands/uL 196 186   NEUTROS PCT %  --  76*   LYMPHS PCT %  --  15   MONOS PCT %  --  7   EOS PCT %  --  1     Results from last 7 days   Lab Units 19  0500 19  0626   POTASSIUM mmol/L 3 5 3 7   CHLORIDE mmol/L 100 101   CO2 mmol/L 26 25   BUN mg/dL 8 7   CREATININE mg/dL 0 71 0 59*   CALCIUM mg/dL 8 8 8 6   ALK PHOS U/L  --  88   ALT U/L  --  18   AST U/L  --  16     Results from last 7 days   Lab Units 19  0014   INR  1 10       * I Have Reviewed All Lab Data Listed Above   * Additional Pertinent Lab Tests Reviewed: All Labs Within Last 24 Hours Reviewed    Imaging:    Imaging Reports Reviewed Today Include:  None    Cultures:   Blood Culture: No results found for: BLOODCX  Urine Culture: No results found for: URINECX  Sputum Culture: No components found for: SPUTUMCX  Wound Culture: No results found for: WOUNDCULT    Last 24 Hours Medication List:     Current Facility-Administered Medications:  acetaminophen 650 mg Oral Q6H PRN Iveth Peterson MD    enoxaparin 40 mg Subcutaneous Daily Ples Bita III, MD    hydrALAZINE 10 mg Intravenous Q6H PRN Ples Bita MD JUAN F    HYDROmorphone 0 5 mg Intravenous Q6H PRN Iveth Peterson MD    HYDROmorphone 1 mg Intravenous Q3H PRN Iveth Peterson MD    methadone 100 mg Oral Daily Ples Bita MD JUAN F    metoclopramide 10 mg Intravenous Q6H [address] Iveth Peterson MD    pantoprazole 40 mg Intravenous Q12H [address] Iveth Peterson MD    pregabalin 100 mg Oral BID Ples Bita III, MD    promethazine 12 5 mg Intravenous Q6H PRN Iveth Peterson MD    sodium chloride 75 mL/hr Intravenous Continuous Ples Bita MD JUAN F Last Rate: 75 mL/hr (09/26/19 0743)        Today, Patient Was Seen By: Greyson Best MD    ** Please Note: Dragon 360 Dictation voice to text software may have been used in the creation of this document   **

## 2019-09-27 NOTE — ASSESSMENT & PLAN NOTE
· WBC is 21 6 early on now normalized  · Likely due to intractable nausea vomiting  · Continue to monitor

## 2019-09-27 NOTE — ASSESSMENT & PLAN NOTE
· Pancreatic pseudocyst/cyst/gastric cyst  · Gastric cyst is increasing in size according to recent CT scan  · Patient was getting further workup outpatient with GI  · He was to plan for an EGD/EUS with FNA for cystic lesions/pancreas/CBD from abnormal MRI findings on 9/05/2019  ·  MRI/MRCP showed a cyst lesion at the surface of the body of the pancreas extending to the greater curvature of the stomach without associated PD dilatation and thought to be a possible pseudocyst; additional cystic lesions noted in the submucosal/intramural location in the stomach, additional cyst seen in the RUQ lateral to the CBD measuring 1 5 x 1 5 cm  · CA 19-9 normal  · GI did EGD and confirmed   EUS next week to evaluate  ·

## 2019-09-27 NOTE — SOCIAL WORK
CM name and role reviewed  Discharge Checklist reviewed and CM will continue to monitor for progress toward discharge goals in nursing and provider rounds  30 day readmission  CM met with pt  Pt stated that he returned to the hospital due to ongoing pancreatitis issue  Pt stated that he did follow up with Dr Coy Shannon after he was discharged  He said that he saw Shelton his Damon outpatient CM in the same building  He said that he also followed up with Daelaida with GI  Pt is ready for discharge today  Pt stated that he  Pt stated that he lives with his wife  He reported that he has triplets at home that he takes care of  Pt reported that he is independent  He said that he did break his neck and back in the past  He said that he was put on narcotics and now goes to [facility]  He said that he goes to [facility] in [facility]  He stated that he has prescription coverage  Pt stated that he does not have a POA but has his wife Rodrigue Gonzalez as a  in case of emergency  Pt state that he drives but has a friend that is coming to pick him up upon discharge  Per chart review, pt has a follow up appt next week in [facility] [place] [facility]  CM reviewed discharge planning process including the following: identifying help at home, patient preference for discharge planning needs, pharmacy preference, and availability of treatment team to discuss questions or concerns patient and/or family may have regarding understanding medications and recognizing signs and symptoms once discharged  CM also encouraged patient to follow up with all recommended appointments after discharge  Patient advised of importance for patient and family to participate in managing patients medical well being

## 2019-09-27 NOTE — PROGRESS NOTES
The pantoprazole has / have been converted to Oral per [facility] IV-to-PO Auto-Conversion Protocol for Adults as approved by the Pharmacy and Therapeutics Committee  The patient met all eligible criteria:  3 Age = 25years old   2) Received at least one dose of the IV form   3) Receiving at least one other scheduled oral/enteral medication   4) Tolerating an oral/enteral diet   and did not have any exclusions:   1) Critical care patient   2) Active GI bleed (IF assessing H2RAs or PPIs)   3) Continuous tube feeding (IF assessing cipro, doxycycline, levofloxacin, minocycline, rifampin, or voriconazole)   4) Receiving PO vancomycin (IF assessing metronidazole)   5) Persistent nausea and/or vomiting   6) Ileus or gastrointestinal obstruction   7) Sanju/nasogastric tube set for continuous suction   8) Specific order not to automatically convert to PO (in the order's comments or if discussed in the most recent Infectious Disease or primary team's progress notes)

## 2019-09-30 ENCOUNTER — TELEPHONE (OUTPATIENT)
Dept: GASTROENTEROLOGY | Facility: CLINIC | Age: 39
End: 2019-09-30

## 2019-09-30 NOTE — DISCHARGE SUMMARY
Discharge- Suzie Richards 1980, 37 y o  male MRN: [id number]    Unit/Bed#: -01 Encounter: [id number]    Primary Care Provider: Winifred Loyd DO   Date and time admitted to hospital: 9/24/2019  7:16 PM        * Acute recurrent pancreatitisresolved as of 9/27/2019  Assessment & Plan  Patient asymptomatic with regard to pancreatitis at the time of discharge  He will follow up as an outpatient with his appointment in the next 5 days for EUS and evaluation of pancreatic cyst   Patient instructed to avoid high cholesterol foods and will be instructed to eat a full liquid diet until he is evaluated by the GI team next week  GI has instructed the patient to use Reglan 5 mg 4 times daily and if that he is not receiving relief can increase to 10 mg    Intractable vomiting with nausearesolved as of 9/27/2019  Assessment & Plan  Ct scan of abdomen and pelvis reveals  development of pancreatitis with subsequent increase in the previous pseudocyst which extends into the stomach wall as described above   The increasing bilobed cystic structure in the stomach wall contributes to luminal narrowing of the antrum of stomach which may explain the patient's vomiting  Verla Mix is also secondary inflammatory changes of the 3rd and 4th portion of duodenum which cannot be further evaluated without oral contrast material    · Continue oral PPI  · Advance diet full liquid  ·  f/u in [place] next week , already scheduled  Acute duodenitisresolved as of 9/27/2019  Assessment & Plan  Continue PPI    No further loose stool    Pancreatic cyst  Assessment & Plan  · Pancreatic pseudocyst/cyst/gastric cyst  · Gastric cyst is increasing in size according to recent CT scan  · Patient was getting further workup outpatient with GI  · He was to plan for an EGD/EUS with FNA for cystic lesions/pancreas/CBD from abnormal MRI findings on 9/05/2019  ·  MRI/MRCP showed a cyst lesion at the surface of the body of the pancreas extending to the greater curvature of the stomach without associated PD dilatation and thought to be a possible pseudocyst; additional cystic lesions noted in the submucosal/intramural location in the stomach, additional cyst seen in the RUQ lateral to the CBD measuring 1 5 x 1 5 cm  · CA 19-9 normal  · GI did EGD and confirmed  EUS next week to evaluate  ·           Gastritisresolved as of 9/27/2019  Assessment & Plan    · Continue PPI    Hypertension  Assessment & Plan  Continue to nor min but hold off on lisinopril due to potential for causing pancreatitis    Opiate dependence, continuous (HCC)  Assessment & Plan  · On chronic methadone treatment for chronic neck and back pain, confirmed dosing of 100 mg daily from [facility]  · Patient is down to few cigarettes daily  · Declined nicotine patch    Morbid obesity ([facility] [address] )  Assessment & Plan  Life style modifications counseled      Leukocytosisresolved as of 9/27/2019  Assessment & Plan  · WBC is 21 6 early on now normalized  · Likely due to intractable nausea vomiting            Discharging Physician / Practitioner: Guerrero Parks MD  PCP: Winifred Loyd DO  Admission Date:   Admission Orders (From admission, onward)     Ordered        09/24/19 2150  Inpatient Admission  Once                   Discharge Date: 9/27/19    Resolved Problems  Date Reviewed: 9/26/2019          Resolved    * (Principal) Acute recurrent pancreatitis 9/27/2019     Resolved by  Guerrero Parks MD    Gastritis 9/27/2019     Resolved by  Guerrero Parks MD    Intractable vomiting with nausea 9/27/2019     Resolved by  Guerrero Parks MD    Leukocytosis 9/27/2019     Resolved by  Guerrero Parks MD    Acute duodenitis 9/27/2019     Resolved by  Guerrero Parks MD          Consultations During Hospital Stay:  · GI    Procedures Performed:   · EGD:  Affirmed presence of large pseudocyst impinging on antrum  · CT abdomen and pelvis suggest development pancreatitis with subsequent increase in previous pseudocyst which extends into the stomach wall  Increasing by the were cystic structure in the stomach all contribute to luminal narrowing of the antrum of the stomach which may explain the patient's vomiting  There is also secondary inflammatory changes in the 3rd and 4th portion of the duodenum possibly representing duodenitis  Difficult to evaluate without oral contrast   · Ultrasound right upper quadrant:  Shows hepatomegaly with hepatic steatosis normal gallbladder    Significant Findings / Test Results:   · As above  · Discharging creatinine 0 71  · Discharging white count 10 93  · Discharging hemoglobin 12 2  Discharging liver enzymes within normal limits  Incidental Findings:   · None     Test Results Pending at Discharge (will require follow up): · None     Outpatient Tests Requested:  · None    Complications:  None    Reason for Admission:  Nausea vomiting    Hospital Course:     Moisés Lerma is a 37 y o  male patient who originally presented to the hospital on 9/24/2019 due to intractable nausea vomiting  Patient was recently in hospital and found to have pancreatic pseudocyst with pancreatitis  He returns with intractable nausea vomiting  CT scan showed increasing size of the pseudocyst impinging on the antrum of the stomach  Patient underwent EGD and confirmed if that indeed there is significant obstruction to this area  Patient has an appointment scheduled for EUS and evaluation of the pseudocyst and [place] where a can be more safely dealt with  Patient is encouraged to continue with a full liquid diet only into his appointment next week  At the time of discharge his nausea vomiting has resolved  He will follow up as an outpatient for further evaluation  Diet was advanced to full liquids and he was tolerating with no difficulty prior to discharge          Please see above list of diagnoses and related plan for additional information  Condition at Discharge: fair     Discharge Day Visit / Exam:     Subjective:  Patient feeling much better  Ready to discharge will follow up next week  Agreeable to the full liquid diet  Vitals: Blood Pressure: 142/88 (09/27/19 0801)  Pulse: 77 (09/27/19 0801)  Temperature: 98 6 °F (37 °C) (09/27/19 0801)  Temp Source: Oral (09/26/19 1700)  Respirations: 18 (09/27/19 0801)  SpO2: 96 % (09/27/19 0801)  Exam:   Physical Exam  General well-developed obese male no acute distress normocephalic atraumatic pupils equal round reactive to light extraocular muscles intact mucous membranes are moist neck is supple there is no JVD no lymph nodes no carotid bruits chest is decreased but clear to auscultation is no rhonchi rales or wheezes  Cardiovascular regular rate rhythm positive S1 and S2 no S3-S4 murmur or gallop  Abdomen is obese soft nontender nondistended with positive bowel sounds no hepatosplenomegaly no guarding or rebound  Neurologically patient awake alert oriented cranial nerves 2-12 intact  Discussion with Family:  None    Discharge instructions/Information to patient and family:   See after visit summary for information provided to patient and family  Provisions for Follow-Up Care:  See after visit summary for information related to follow-up care and any pertinent home health orders  Disposition:     Home    For Discharges to [facility] SNF:   · Not Applicable to this Patient - Not Applicable to this Patient    Planned Readmission:  Patient to be readmitted to [place] next week for evaluation as an outpatient     Discharge Statement:  I spent 25 minutes discharging the patient  This time was spent on the day of discharge  I had direct contact with the patient on the day of discharge   Greater than 50% of the total time was spent examining patient, answering all patient questions, arranging and discussing plan of care with patient as well as directly providing post-discharge instructions  Additional time then spent on discharge activities  Discharge Medications:  See after visit summary for reconciled discharge medications provided to patient and family        ** Please Note: This note has been constructed using a voice recognition system **

## 2019-09-30 NOTE — ASSESSMENT & PLAN NOTE
Ct scan of abdomen and pelvis reveals  development of pancreatitis with subsequent increase in the previous pseudocyst which extends into the stomach wall as described above   The increasing bilobed cystic structure in the stomach wall contributes to luminal narrowing of the antrum of stomach which may explain the patient's vomiting  Derikia King is also secondary inflammatory changes of the 3rd and 4th portion of duodenum which cannot be further evaluated without oral contrast material    · Continue oral PPI  · Advance diet full liquid  ·  f/u in [place] next week , already scheduled

## 2019-09-30 NOTE — ASSESSMENT & PLAN NOTE
Patient asymptomatic with regard to pancreatitis at the time of discharge  He will follow up as an outpatient with his appointment in the next 5 days for EUS and evaluation of pancreatic cyst   Patient instructed to avoid high cholesterol foods and will be instructed to eat a full liquid diet until he is evaluated by the GI team next week    GI has instructed the patient to use Reglan 5 mg 4 times daily and if that he is not receiving relief can increase to 10 mg

## 2019-09-30 NOTE — TELEPHONE ENCOUNTER
----- Message from Charisma Bowen MD sent at 9/30/2019  1:37 PM EDT -----  pls tell him path was negative

## 2019-09-30 NOTE — ASSESSMENT & PLAN NOTE
· On chronic methadone treatment for chronic neck and back pain, confirmed dosing of 100 mg daily from [facility]

## 2019-10-01 ENCOUNTER — ANESTHESIA EVENT (OUTPATIENT)
Dept: GASTROENTEROLOGY | Facility: HOSPITAL | Age: 39
End: 2019-10-01

## 2019-10-01 NOTE — ANESTHESIA PREPROCEDURE EVALUATION
Review of Systems/Medical History  Patient summary reviewed  Chart reviewed  No history of anesthetic complications     Cardiovascular  EKG reviewed, Exercise tolerance (METS): >4,  Hypertension (-atenolol, took this AM ) controlled, Past MI (> 5 years ago) , Dysrhythmias , No angina , No CHF ,    Pulmonary       GI/Hepatic  Dysphagia,   GERD well controlled, Liver disease , Pancreatic problem (pancreatic  cyst),             Endo/Other    Obesity  morbid obesity   GYN  Negative gynecology ROS          Hematology  Negative hematology ROS      Musculoskeletal  Negative musculoskeletal ROS        Neurology      Comment: Hx of cervical and lumbar fractures s/p cervical and lumbar fusion 2005 Psychology     Chronic opioid dependence Chronic pain,   Comment: - methadone 100mg/day          9/25/19 ECG: NSR, prolonged QT (480)   Stress Exercise Test 3/15/10   Echocardiogram images were obtained in the short axis, long axis,  2-chamber, 3-chamber, and 4-chamber views at rest and low level stress, peak stress, and in recovery  There was normal wall motion and normal wall thickening with resting ejection fraction estimated at 60%  With dobutamine-induced stress, there was normal increasing contractility and increase in wall thickening with estimated peak ejection fraction of 75%  Physical Exam    Airway    Mallampati score: III  TM Distance: >3 FB  Neck ROM: full     Dental   upper dentures,     Cardiovascular      Pulmonary      Other Findings        Anesthesia Plan  ASA Score- 3     Anesthesia Type- general with ASA Monitors  Additional Monitors:   Airway Plan: ETT  Plan Factors-    Induction- intravenous and rapid sequence induction  Postoperative Plan- Plan for postoperative opioid use  Informed Consent- Anesthetic plan and risks discussed with patient  I personally reviewed this patient with the CRNA  Discussed and agreed on the Anesthesia Plan with the CRNA  Rosalio Diaz

## 2019-10-02 ENCOUNTER — HOSPITAL ENCOUNTER (OUTPATIENT)
Dept: GASTROENTEROLOGY | Facility: HOSPITAL | Age: 39
Setting detail: OUTPATIENT SURGERY
Discharge: HOME | End: 2019-10-02
Attending: INTERNAL MEDICINE | Admitting: INTERNAL MEDICINE
Payer: COMMERCIAL

## 2019-10-02 ENCOUNTER — ANESTHESIA (OUTPATIENT)
Dept: GASTROENTEROLOGY | Facility: HOSPITAL | Age: 39
End: 2019-10-02

## 2019-10-02 VITALS
WEIGHT: 290 LBS | HEART RATE: 65 BPM | OXYGEN SATURATION: 92 % | HEIGHT: 68 IN | BODY MASS INDEX: 43.95 KG/M2 | SYSTOLIC BLOOD PRESSURE: 131 MMHG | RESPIRATION RATE: 18 BRPM | TEMPERATURE: 97.3 F | DIASTOLIC BLOOD PRESSURE: 63 MMHG

## 2019-10-02 DIAGNOSIS — K86.2 PANCREATIC CYST: ICD-10-CM

## 2019-10-02 LAB
AMYLASE FLD QL: 6295 U/L
CEA FLD-MCNC: 70.5 NG/ML
TRIGL FLD-MCNC: 22 MG/DL

## 2019-10-02 PROCEDURE — 82378 CARCINOEMBRYONIC ANTIGEN: CPT | Performed by: INTERNAL MEDICINE

## 2019-10-02 PROCEDURE — 84478 ASSAY OF TRIGLYCERIDES: CPT | Performed by: INTERNAL MEDICINE

## 2019-10-02 PROCEDURE — 82150 ASSAY OF AMYLASE: CPT | Performed by: INTERNAL MEDICINE

## 2019-10-02 PROCEDURE — 87070 CULTURE OTHR SPECIMN AEROBIC: CPT | Performed by: INTERNAL MEDICINE

## 2019-10-02 PROCEDURE — 87205 SMEAR GRAM STAIN: CPT | Performed by: INTERNAL MEDICINE

## 2019-10-02 PROCEDURE — 87077 CULTURE AEROBIC IDENTIFY: CPT | Performed by: INTERNAL MEDICINE

## 2019-10-02 PROCEDURE — 43238 EGD US FINE NEEDLE BX/ASPIR: CPT | Performed by: INTERNAL MEDICINE

## 2019-10-02 PROCEDURE — 87181 SC STD AGAR DILUTION PER AGT: CPT | Performed by: INTERNAL MEDICINE

## 2019-10-02 PROCEDURE — NC001 PR NO CHARGE: Performed by: INTERNAL MEDICINE

## 2019-10-02 RX ORDER — FENTANYL CITRATE 50 UG/ML
INJECTION, SOLUTION INTRAMUSCULAR; INTRAVENOUS AS NEEDED
Status: DISCONTINUED | OUTPATIENT
Start: 2019-10-02 | End: 2019-10-02 | Stop reason: SURG

## 2019-10-02 RX ORDER — SODIUM CHLORIDE 9 MG/ML
INJECTION, SOLUTION INTRAVENOUS CONTINUOUS PRN
Status: DISCONTINUED | OUTPATIENT
Start: 2019-10-02 | End: 2019-10-02 | Stop reason: SURG

## 2019-10-02 RX ORDER — PROPOFOL 10 MG/ML
INJECTION, EMULSION INTRAVENOUS AS NEEDED
Status: DISCONTINUED | OUTPATIENT
Start: 2019-10-02 | End: 2019-10-02 | Stop reason: SURG

## 2019-10-02 RX ORDER — LIDOCAINE HYDROCHLORIDE 10 MG/ML
INJECTION, SOLUTION INFILTRATION; PERINEURAL AS NEEDED
Status: DISCONTINUED | OUTPATIENT
Start: 2019-10-02 | End: 2019-10-02 | Stop reason: SURG

## 2019-10-02 RX ORDER — SUCCINYLCHOLINE/SOD CL,ISO/PF 100 MG/5ML
SYRINGE (ML) INTRAVENOUS AS NEEDED
Status: DISCONTINUED | OUTPATIENT
Start: 2019-10-02 | End: 2019-10-02 | Stop reason: SURG

## 2019-10-02 RX ORDER — CIPROFLOXACIN 500 MG/1
500 TABLET, FILM COATED ORAL EVERY 12 HOURS SCHEDULED
Qty: 6 TABLET | Refills: 0 | Status: SHIPPED | OUTPATIENT
Start: 2019-10-02 | End: 2019-10-05

## 2019-10-02 RX ORDER — GLYCOPYRROLATE 0.2 MG/ML
INJECTION INTRAMUSCULAR; INTRAVENOUS AS NEEDED
Status: DISCONTINUED | OUTPATIENT
Start: 2019-10-02 | End: 2019-10-02 | Stop reason: SURG

## 2019-10-02 RX ORDER — FENTANYL CITRATE/PF 50 MCG/ML
12.5 SYRINGE (ML) INJECTION
Status: CANCELLED | OUTPATIENT
Start: 2019-10-02

## 2019-10-02 RX ORDER — NEOSTIGMINE METHYLSULFATE 1 MG/ML
INJECTION INTRAVENOUS AS NEEDED
Status: DISCONTINUED | OUTPATIENT
Start: 2019-10-02 | End: 2019-10-02 | Stop reason: SURG

## 2019-10-02 RX ORDER — ROCURONIUM BROMIDE 10 MG/ML
INJECTION, SOLUTION INTRAVENOUS AS NEEDED
Status: DISCONTINUED | OUTPATIENT
Start: 2019-10-02 | End: 2019-10-02 | Stop reason: SURG

## 2019-10-02 RX ORDER — ONDANSETRON 2 MG/ML
INJECTION INTRAMUSCULAR; INTRAVENOUS AS NEEDED
Status: DISCONTINUED | OUTPATIENT
Start: 2019-10-02 | End: 2019-10-02 | Stop reason: SURG

## 2019-10-02 RX ADMIN — GLYCOPYRROLATE 0.4 MG: 0.2 INJECTION, SOLUTION INTRAMUSCULAR; INTRAVENOUS at 10:44

## 2019-10-02 RX ADMIN — ONDANSETRON 4 MG: 2 INJECTION INTRAMUSCULAR; INTRAVENOUS at 10:43

## 2019-10-02 RX ADMIN — FENTANYL CITRATE 100 MCG: 50 INJECTION, SOLUTION INTRAMUSCULAR; INTRAVENOUS at 09:43

## 2019-10-02 RX ADMIN — SODIUM CHLORIDE: 0.9 INJECTION, SOLUTION INTRAVENOUS at 09:37

## 2019-10-02 RX ADMIN — LIDOCAINE HYDROCHLORIDE 50 MG: 10 INJECTION, SOLUTION INFILTRATION; PERINEURAL at 09:43

## 2019-10-02 RX ADMIN — ROCURONIUM BROMIDE 15 MG: 10 INJECTION INTRAVENOUS at 09:47

## 2019-10-02 RX ADMIN — Medication 100 MG: at 09:43

## 2019-10-02 RX ADMIN — CIPROFLOXACIN 400 MG: 2 INJECTION INTRAVENOUS at 09:57

## 2019-10-02 RX ADMIN — ROCURONIUM BROMIDE 5 MG: 10 INJECTION INTRAVENOUS at 09:43

## 2019-10-02 RX ADMIN — NEOSTIGMINE METHYLSULFATE 3 MG: 1 INJECTION, SOLUTION INTRAVENOUS at 10:44

## 2019-10-02 RX ADMIN — PROPOFOL 200 MG: 10 INJECTION, EMULSION INTRAVENOUS at 09:43

## 2019-10-02 NOTE — ANESTHESIA POSTPROCEDURE EVALUATION
Post-Op Assessment Note    CV Status:  Stable  Pain Score: 0    Pain management: adequate     Mental Status:  Alert and awake   Hydration Status:  Euvolemic   PONV Controlled:  Controlled   Airway Patency:  Patent   Post Op Vitals Reviewed: Yes      Staff: CRNA           BP 94/57 (10/02/19 1052)    Temp (!) 97 3 °F (36 3 °C) (10/02/19 1052)    Pulse 62 (10/02/19 1052)   Resp 18 (10/02/19 1052)    SpO2 97 % (10/02/19 1052)

## 2019-10-05 LAB
BACTERIA SPEC BFLD CULT: ABNORMAL
BACTERIA SPEC BFLD CULT: ABNORMAL
GRAM STN SPEC: ABNORMAL
GRAM STN SPEC: ABNORMAL

## 2019-10-08 ENCOUNTER — HOSPITAL ENCOUNTER (EMERGENCY)
Facility: HOSPITAL | Age: 39
Discharge: HOME | End: 2019-10-08
Attending: EMERGENCY MEDICINE
Payer: COMMERCIAL

## 2019-10-08 ENCOUNTER — APPOINTMENT (EMERGENCY)
Dept: CT IMAGING | Facility: HOSPITAL | Age: 39
End: 2019-10-08
Payer: COMMERCIAL

## 2019-10-08 VITALS
RESPIRATION RATE: 20 BRPM | HEIGHT: 67 IN | WEIGHT: 284.39 LBS | DIASTOLIC BLOOD PRESSURE: 101 MMHG | BODY MASS INDEX: 44.64 KG/M2 | TEMPERATURE: 97.8 F | HEART RATE: 68 BPM | OXYGEN SATURATION: 95 % | SYSTOLIC BLOOD PRESSURE: 170 MMHG

## 2019-10-08 DIAGNOSIS — R11.2 NAUSEA & VOMITING: Primary | ICD-10-CM

## 2019-10-08 LAB
ALBUMIN SERPL BCP-MCNC: 3.4 G/DL (ref 3.5–5)
ALP SERPL-CCNC: 88 U/L (ref 46–116)
ALT SERPL W P-5'-P-CCNC: 16 U/L (ref 12–78)
ANION GAP SERPL CALCULATED.3IONS-SCNC: 13 MMOL/L (ref 4–13)
AST SERPL W P-5'-P-CCNC: 18 U/L (ref 5–45)
BASOPHILS # BLD AUTO: 0.01 THOUSANDS/ΜL (ref 0–0.1)
BASOPHILS NFR BLD AUTO: 0 % (ref 0–1)
BILIRUB SERPL-MCNC: 0.3 MG/DL (ref 0.2–1)
BILIRUB UR QL STRIP: NEGATIVE
BUN SERPL-MCNC: 5 MG/DL (ref 5–25)
CALCIUM SERPL-MCNC: 9.3 MG/DL (ref 8.3–10.1)
CHLORIDE SERPL-SCNC: 99 MMOL/L (ref 100–108)
CLARITY UR: CLEAR
CO2 SERPL-SCNC: 24 MMOL/L (ref 21–32)
COLOR UR: YELLOW
CREAT SERPL-MCNC: 0.77 MG/DL (ref 0.6–1.3)
EOSINOPHIL # BLD AUTO: 0.02 THOUSAND/ΜL (ref 0–0.61)
EOSINOPHIL NFR BLD AUTO: 0 % (ref 0–6)
ERYTHROCYTE [DISTWIDTH] IN BLOOD BY AUTOMATED COUNT: 15.9 % (ref 11.6–15.1)
GFR SERPL CREATININE-BSD FRML MDRD: 113 ML/MIN/1.73SQ M
GLUCOSE SERPL-MCNC: 142 MG/DL (ref 65–140)
GLUCOSE UR STRIP-MCNC: NEGATIVE MG/DL
HCT VFR BLD AUTO: 42.6 % (ref 36.5–49.3)
HGB BLD-MCNC: 13.4 G/DL (ref 12–17)
HGB UR QL STRIP.AUTO: NEGATIVE
IMM GRANULOCYTES # BLD AUTO: 0.04 THOUSAND/UL (ref 0–0.2)
IMM GRANULOCYTES NFR BLD AUTO: 0 % (ref 0–2)
KETONES UR STRIP-MCNC: ABNORMAL MG/DL
LEUKOCYTE ESTERASE UR QL STRIP: NEGATIVE
LIPASE SERPL-CCNC: 327 U/L (ref 73–393)
LYMPHOCYTES # BLD AUTO: 1.14 THOUSANDS/ΜL (ref 0.6–4.47)
LYMPHOCYTES NFR BLD AUTO: 10 % (ref 14–44)
MCH RBC QN AUTO: 26.5 PG (ref 26.8–34.3)
MCHC RBC AUTO-ENTMCNC: 31.5 G/DL (ref 31.4–37.4)
MCV RBC AUTO: 84 FL (ref 82–98)
MONOCYTES # BLD AUTO: 0.38 THOUSAND/ΜL (ref 0.17–1.22)
MONOCYTES NFR BLD AUTO: 3 % (ref 4–12)
NEUTROPHILS # BLD AUTO: 9.88 THOUSANDS/ΜL (ref 1.85–7.62)
NEUTS SEG NFR BLD AUTO: 87 % (ref 43–75)
NITRITE UR QL STRIP: NEGATIVE
NRBC BLD AUTO-RTO: 0 /100 WBCS
PH UR STRIP.AUTO: 7 [PH]
PLATELET # BLD AUTO: 252 THOUSANDS/UL (ref 149–390)
PMV BLD AUTO: 10.4 FL (ref 8.9–12.7)
POTASSIUM SERPL-SCNC: 3.9 MMOL/L (ref 3.5–5.3)
PROT SERPL-MCNC: 7.8 G/DL (ref 6.4–8.2)
PROT UR STRIP-MCNC: NEGATIVE MG/DL
RBC # BLD AUTO: 5.06 MILLION/UL (ref 3.88–5.62)
SODIUM SERPL-SCNC: 136 MMOL/L (ref 136–145)
SP GR UR STRIP.AUTO: 1.01 (ref 1–1.03)
UROBILINOGEN UR QL STRIP.AUTO: 0.2 E.U./DL
WBC # BLD AUTO: 11.47 THOUSAND/UL (ref 4.31–10.16)

## 2019-10-08 PROCEDURE — 96361 HYDRATE IV INFUSION ADD-ON: CPT

## 2019-10-08 PROCEDURE — 96374 THER/PROPH/DIAG INJ IV PUSH: CPT

## 2019-10-08 PROCEDURE — 83690 ASSAY OF LIPASE: CPT | Performed by: EMERGENCY MEDICINE

## 2019-10-08 PROCEDURE — 36415 COLL VENOUS BLD VENIPUNCTURE: CPT

## 2019-10-08 PROCEDURE — 80053 COMPREHEN METABOLIC PANEL: CPT | Performed by: EMERGENCY MEDICINE

## 2019-10-08 PROCEDURE — 96376 TX/PRO/DX INJ SAME DRUG ADON: CPT

## 2019-10-08 PROCEDURE — 96375 TX/PRO/DX INJ NEW DRUG ADDON: CPT

## 2019-10-08 PROCEDURE — 81003 URINALYSIS AUTO W/O SCOPE: CPT | Performed by: EMERGENCY MEDICINE

## 2019-10-08 PROCEDURE — 85025 COMPLETE CBC W/AUTO DIFF WBC: CPT | Performed by: EMERGENCY MEDICINE

## 2019-10-08 PROCEDURE — 99285 EMERGENCY DEPT VISIT HI MDM: CPT | Performed by: EMERGENCY MEDICINE

## 2019-10-08 PROCEDURE — 99284 EMERGENCY DEPT VISIT MOD MDM: CPT

## 2019-10-08 PROCEDURE — 74176 CT ABD & PELVIS W/O CONTRAST: CPT

## 2019-10-08 RX ORDER — DICYCLOMINE HCL 20 MG
20 TABLET ORAL 2 TIMES DAILY
Qty: 20 TABLET | Refills: 0 | Status: SHIPPED | OUTPATIENT
Start: 2019-10-08

## 2019-10-08 RX ORDER — ONDANSETRON 4 MG/1
4 TABLET, ORALLY DISINTEGRATING ORAL EVERY 8 HOURS PRN
Qty: 20 TABLET | Refills: 0 | Status: SHIPPED | OUTPATIENT
Start: 2019-10-08

## 2019-10-08 RX ORDER — DIPHENHYDRAMINE HYDROCHLORIDE 50 MG/ML
50 INJECTION INTRAMUSCULAR; INTRAVENOUS ONCE
Status: COMPLETED | OUTPATIENT
Start: 2019-10-08 | End: 2019-10-08

## 2019-10-08 RX ORDER — METOCLOPRAMIDE HYDROCHLORIDE 5 MG/ML
10 INJECTION INTRAMUSCULAR; INTRAVENOUS ONCE
Status: COMPLETED | OUTPATIENT
Start: 2019-10-08 | End: 2019-10-08

## 2019-10-08 RX ORDER — ATENOLOL 100 MG/1
TABLET ORAL
COMMUNITY
Start: 2019-09-13

## 2019-10-08 RX ORDER — ONDANSETRON 4 MG/1
4 TABLET, ORALLY DISINTEGRATING ORAL ONCE
Status: COMPLETED | OUTPATIENT
Start: 2019-10-08 | End: 2019-10-08

## 2019-10-08 RX ORDER — PROMETHAZINE HYDROCHLORIDE 25 MG/1
25 TABLET ORAL EVERY 6 HOURS PRN
COMMUNITY
Start: 2019-09-19

## 2019-10-08 RX ORDER — ONDANSETRON 2 MG/ML
4 INJECTION INTRAMUSCULAR; INTRAVENOUS ONCE
Status: COMPLETED | OUTPATIENT
Start: 2019-10-08 | End: 2019-10-08

## 2019-10-08 RX ORDER — OMEPRAZOLE 40 MG/1
CAPSULE, DELAYED RELEASE ORAL
COMMUNITY
Start: 2019-09-13 | End: 2019-11-14 | Stop reason: HOSPADM

## 2019-10-08 RX ORDER — PREGABALIN 100 MG/1
100 CAPSULE ORAL 2 TIMES DAILY
COMMUNITY
Start: 2019-09-17

## 2019-10-08 RX ORDER — METHADONE HYDROCHLORIDE 10 MG/ML
100 CONCENTRATE ORAL
COMMUNITY

## 2019-10-08 RX ORDER — PROCHLORPERAZINE 25 MG
25 SUPPOSITORY, RECTAL RECTAL EVERY 12 HOURS PRN
Qty: 12 SUPPOSITORY | Refills: 0 | Status: SHIPPED | OUTPATIENT
Start: 2019-10-08

## 2019-10-08 RX ADMIN — DIPHENHYDRAMINE HYDROCHLORIDE 50 MG: 50 INJECTION, SOLUTION INTRAMUSCULAR; INTRAVENOUS at 19:32

## 2019-10-08 RX ADMIN — DIPHENHYDRAMINE HYDROCHLORIDE 50 MG: 50 INJECTION, SOLUTION INTRAMUSCULAR; INTRAVENOUS at 22:03

## 2019-10-08 RX ADMIN — ONDANSETRON 4 MG: 2 INJECTION INTRAMUSCULAR; INTRAVENOUS at 22:02

## 2019-10-08 RX ADMIN — ONDANSETRON 4 MG: 4 TABLET, ORALLY DISINTEGRATING ORAL at 17:37

## 2019-10-08 RX ADMIN — METOCLOPRAMIDE 10 MG: 5 INJECTION, SOLUTION INTRAMUSCULAR; INTRAVENOUS at 19:34

## 2019-10-08 RX ADMIN — SODIUM CHLORIDE 1000 ML: 0.9 INJECTION, SOLUTION INTRAVENOUS at 19:33

## 2019-10-08 NOTE — ED PROVIDER NOTES
History  Chief Complaint   Patient presents with    Vomiting     pt presents for vomiting and abdominal pain since yesterday  pt also c/o lightheadedness x2 hours      37year old male patient with history of pancreatic pseudocyst presents emergency department for evaluation of what he feels the same symptoms is previous bouts of pancreatitis  Currently the patient has abdominal tenderness, nausea, vomiting, no fevers, no chills, no chest pains, shortness of breath  The patient will be evaluated with a differential diagnosis to include but not be limited to pancreatitis, gastroenteritis, diarrhea, abdominal pain which is chronic  History provided by:  Patient   used: No    Vomiting   Severity:  Mild  Timing:  Constant  Quality:  Unable to specify  Chronicity:  New  Recent urination:  Normal  Relieved by:  Nothing  Worsened by:  Nothing  Ineffective treatments:  None tried  Associated symptoms: no abdominal pain, no chills, no diarrhea, no fever, no myalgias and no sore throat    Risk factors: no alcohol use, no sick contacts, no suspect food intake and no travel to endemic areas        Prior to Admission Medications   Prescriptions Last Dose Informant Patient Reported? Taking?   atenolol (TENORMIN) 100 mg tablet   Yes No   atenolol (TENORMIN) 50 mg tablet  Self Yes No   Sig: Take 100 mg by mouth daily    methadone (DOLOPHINE) 10 mg/mL oral concentrated solution  Self Yes No   Sig: Take 110 mg by mouth daily in the early morning   methadone (DOLOPHINE) 10 mg/mL oral concentrated solution   Yes No   Sig: Take 100 mg by mouth   metoclopramide (REGLAN) 5 mg tablet   No No   Sig: Take 1 tablet (5 mg total) by mouth 4 (four) times a day as needed (nausea) If symptoms still persist may increase to 10 mg 4 times a day p r n   Per GI instruction   omeprazole (PriLOSEC) 40 MG capsule   Yes No   pregabalin (LYRICA) 100 mg capsule   Yes No   promethazine (PHENERGAN) 25 mg tablet   Yes No Facility-Administered Medications: None       Past Medical History:   Diagnosis Date    Back pain     Cardiac disease     Chronic pain disorder     GERD (gastroesophageal reflux disease)     Hypertension     Myocardial infarction ([address] )     Neck pain        Past Surgical History:   Procedure Laterality Date    BACK SURGERY      HERNIA REPAIR      HERNIA REPAIR  2018    KNEE SURGERY      NECK SURGERY  2005       Family History   Problem Relation Age of Onset    Diabetes Mother     Heart disease Mother     Cancer Father     No Known Problems Brother     Aneurysm Maternal Grandmother     No Known Problems Maternal Grandfather     No Known Problems Paternal Grandmother     No Known Problems Paternal Grandfather      I have reviewed and agree with the history as documented  Social History     Tobacco Use    Smoking status: Current Every Day Smoker     Packs/day: 0 25    Smokeless tobacco: Never Used   Substance Use Topics    Alcohol use: Not Currently    Drug use: Never        Review of Systems   Constitutional: Negative for chills and fever  HENT: Negative for sore throat  Gastrointestinal: Positive for vomiting  Negative for abdominal pain and diarrhea  Musculoskeletal: Negative for myalgias  All other systems reviewed and are negative  Physical Exam  Physical Exam   Constitutional: He is oriented to person, place, and time  He appears well-developed and well-nourished  No distress  HENT:   Head: Normocephalic and atraumatic  Right Ear: External ear normal    Left Ear: External ear normal    Eyes: Conjunctivae and EOM are normal  Right eye exhibits no discharge  Left eye exhibits no discharge  No scleral icterus  Neck: Normal range of motion  Neck supple  No JVD present  No tracheal deviation present  No thyromegaly present  Cardiovascular: Normal rate and regular rhythm  Pulmonary/Chest: Effort normal and breath sounds normal  No stridor  No respiratory distress   He has no wheezes  He has no rales  Abdominal: Soft  Bowel sounds are normal  He exhibits no distension  There is no tenderness  Musculoskeletal: Normal range of motion  He exhibits no edema, tenderness or deformity  Neurological: He is alert and oriented to person, place, and time  No cranial nerve deficit  Coordination normal    Skin: Skin is warm and dry  He is not diaphoretic  Psychiatric: He has a normal mood and affect  His behavior is normal    Nursing note and vitals reviewed        Vital Signs  ED Triage Vitals [10/08/19 1730]   Temperature Pulse Respirations Blood Pressure SpO2   97 8 °F (36 6 °C) 69 20 (!) 198/99 98 %      Temp Source Heart Rate Source Patient Position - Orthostatic VS BP Location FiO2 (%)   Oral Monitor Sitting Left arm --      Pain Score       8           Vitals:    10/08/19 1730 10/08/19 1940 10/08/19 2141   BP: (!) 198/99 159/83 (!) 170/101   Pulse: 69 78 68   Patient Position - Orthostatic VS: Sitting Lying Lying         Visual Acuity      ED Medications  Medications   ondansetron (ZOFRAN-ODT) dispersible tablet 4 mg (4 mg Oral Given 10/8/19 1737)   sodium chloride 0 9 % bolus 1,000 mL (0 mL Intravenous Stopped 10/8/19 2033)   metoclopramide (REGLAN) injection 10 mg (10 mg Intravenous Given 10/8/19 1934)   diphenhydrAMINE (BENADRYL) injection 50 mg (50 mg Intravenous Given 10/8/19 1932)   diphenhydrAMINE (BENADRYL) injection 50 mg (50 mg Intravenous Given 10/8/19 2203)   ondansetron (ZOFRAN) injection 4 mg (4 mg Intravenous Given 10/8/19 2202)       Diagnostic Studies  Results Reviewed     Procedure Component Value Units Date/Time    UA w Reflex to Microscopic w Reflex to Culture [[id number]]  (Abnormal) Collected:  10/08/19 2129    Lab Status:  Final result Specimen:  Urine, Clean Catch Updated:  10/08/19 2149     Color, UA Yellow     Clarity, UA Clear     Specific Gravity, UA 1 015     pH, UA 7 0     Leukocytes, UA Negative     Nitrite, UA Negative     Protein, UA Negative mg/dl Glucose, UA Negative mg/dl      Ketones, UA 15 (1+) mg/dl      Urobilinogen, UA 0 2 E U /dl      Bilirubin, UA Negative     Blood, UA Negative    Comprehensive metabolic panel [[id number]]  (Abnormal) Collected:  10/08/19 1738    Lab Status:  Final result Specimen:  Blood from Hand, Right Updated:  10/08/19 1803     Sodium 136 mmol/L      Potassium 3 9 mmol/L      Chloride 99 mmol/L      CO2 24 mmol/L      ANION GAP 13 mmol/L      BUN 5 mg/dL      Creatinine 0 77 mg/dL      Glucose 142 mg/dL      Calcium 9 3 mg/dL      AST 18 U/L      ALT 16 U/L      Alkaline Phosphatase 88 U/L      Total Protein 7 8 g/dL      Albumin 3 4 g/dL      Total Bilirubin 0 30 mg/dL      eGFR 113 ml/min/1 73sq m     Narrative:       National Kidney Disease Foundation guidelines for Chronic Kidney Disease (CKD):     Stage 1 with normal or high GFR (GFR > 90 mL/min/1 73 square meters)    Stage 2 Mild CKD (GFR = 60-89 mL/min/1 73 square meters)    Stage 3A Moderate CKD (GFR = 45-59 mL/min/1 73 square meters)    Stage 3B Moderate CKD (GFR = 30-44 mL/min/1 73 square meters)    Stage 4 Severe CKD (GFR = 15-29 mL/min/1 73 square meters)    Stage 5 End Stage CKD (GFR <15 mL/min/1 73 square meters)  Note: GFR calculation is accurate only with a steady state creatinine    Lipase [[id number]]  (Normal) Collected:  10/08/19 1738    Lab Status:  Final result Specimen:  Blood from Hand, Right Updated:  10/08/19 1803     Lipase 327 u/L     CBC and differential [[id number]]  (Abnormal) Collected:  10/08/19 1737    Lab Status:  Final result Specimen:  Blood from Hand, Right Updated:  10/08/19 1746     WBC 11 47 Thousand/uL      RBC 5 06 Million/uL      Hemoglobin 13 4 g/dL      Hematocrit 42 6 %      MCV 84 fL      MCH 26 5 pg      MCHC 31 5 g/dL      RDW 15 9 %      MPV 10 4 fL      Platelets 146 Thousands/uL      nRBC 0 /100 WBCs      Neutrophils Relative 87 %      Immat GRANS % 0 %      Lymphocytes Relative 10 %      Monocytes Relative 3 % Eosinophils Relative 0 %      Basophils Relative 0 %      Neutrophils Absolute 9 88 Thousands/µL      Immature Grans Absolute 0 04 Thousand/uL      Lymphocytes Absolute 1 14 Thousands/µL      Monocytes Absolute 0 38 Thousand/µL      Eosinophils Absolute 0 02 Thousand/µL      Basophils Absolute 0 01 Thousands/µL                  CT abdomen pelvis wo contrast   Final Result by Joanne Leslie MD (10/08 2121)      1  Decreased in focal thickening of the antrum of the stomach which may be due to gastritis and decreased inflammatory change of the duodenum when compared with prior exam   Correlate with endoscopic findings  2   Persistent cystic structure within the stomach wall likely due to pseudocyst    3   No evidence of bowel obstruction  4   Inflammatory change about the pancreatic tail markedly improved since prior examination  Correlate with lipase for pancreatitis  5   Hepatic steatosis  6   Stable 2 cm left adrenal nodule           Workstation performed: [id number]                    Procedures  Procedures       ED Course                               MDM  Number of Diagnoses or Management Options  Nausea & vomiting: new and requires workup     Amount and/or Complexity of Data Reviewed  Clinical lab tests: ordered and reviewed  Tests in the radiology section of CPT®: ordered and reviewed  Decide to obtain previous medical records or to obtain history from someone other than the patient: yes  Review and summarize past medical records: yes    Patient Progress  Patient progress: stable      Disposition  Final diagnoses:   Nausea & vomiting     Time reflects when diagnosis was documented in both MDM as applicable and the Disposition within this note     Time User Action Codes Description Comment    10/8/2019 11:00 PM Dolly Raphael Add [R11 2] Nausea & vomiting       ED Disposition     ED Disposition Condition Date/Time Comment    Discharge Stable Tue Oct 8, 2019 11:00 PM Mary Jane Cross discharge to home/self care             Follow-up Information     Follow up With Specialties Details Why [address] [place] [address], DO Family Medicine   [address] [address] [facility] [address]  [phone]            Discharge Medication List as of 10/8/2019 11:01 PM      START taking these medications    Details   dicyclomine (BENTYL) 20 mg tablet Take 1 tablet (20 mg total) by mouth 2 (two) times a day, Starting Tue 10/8/2019, Normal      ondansetron (ZOFRAN-ODT) 4 mg disintegrating tablet Take 1 tablet (4 mg total) by mouth every 8 (eight) hours as needed for nausea or vomiting, Starting Tue 10/8/2019, Normal      prochlorperazine (COMPAZINE) 25 mg suppository Insert 1 suppository (25 mg total) into the rectum every 12 (twelve) hours as needed for nausea or vomiting, Starting Tue 10/8/2019, Normal         CONTINUE these medications which have NOT CHANGED    Details   !! atenolol (TENORMIN) 100 mg tablet Starting Fri 9/13/2019, Historical Med      !! atenolol (TENORMIN) 50 mg tablet Take 100 mg by mouth daily , Historical Med      !! methadone (DOLOPHINE) 10 mg/mL oral concentrated solution Take 110 mg by mouth daily in the early morning, Historical Med      !! methadone (DOLOPHINE) 10 mg/mL oral concentrated solution Take 100 mg by mouth, Historical Med      metoclopramide (REGLAN) 5 mg tablet Take 1 tablet (5 mg total) by mouth 4 (four) times a day as needed (nausea) If symptoms still persist may increase to 10 mg 4 times a day p r n  Per GI instruction, Starting Fri 9/27/2019, Print      omeprazole (PriLOSEC) 40 MG capsule Starting Fri 9/13/2019, Historical Med      pregabalin (LYRICA) 100 mg capsule Starting Tue 9/17/2019, Historical Med      promethazine (PHENERGAN) 25 mg tablet Starting Thu 9/19/2019, Historical Med       !! - Potential duplicate medications found  Please discuss with provider  No discharge procedures on file      ED Provider  Electronically Signed by           Arline Robbins DO  10/09/19 0948

## 2019-10-09 NOTE — SOCIAL WORK
Follow up call placed, cm spoke with wife  contact information provided if patient had any questions regarding follow up appointments/resources

## 2019-10-10 ENCOUNTER — TELEPHONE (OUTPATIENT)
Dept: GASTROENTEROLOGY | Facility: AMBULARY SURGERY CENTER | Age: 39
End: 2019-10-10

## 2019-10-14 DIAGNOSIS — K85.90 ACUTE PANCREATITIS: ICD-10-CM

## 2019-10-14 DIAGNOSIS — K29.70 GASTRITIS: ICD-10-CM

## 2019-10-14 NOTE — TELEPHONE ENCOUNTER
Collins Norris pt - Pt states he is having a lot of problems and he wants to know if he should go back to the hospital  Please call [phone]   Ty

## 2019-10-15 RX ORDER — METOCLOPRAMIDE 5 MG/1
5 TABLET ORAL 4 TIMES DAILY PRN
Qty: 240 TABLET | Refills: 0 | Status: SHIPPED | OUTPATIENT
Start: 2019-10-15 | End: 2019-12-14

## 2019-10-29 ENCOUNTER — TELEPHONE (OUTPATIENT)
Dept: GASTROENTEROLOGY | Facility: CLINIC | Age: 39
End: 2019-10-29

## 2019-10-29 RX ORDER — CIPROFLOXACIN 500 MG/1
TABLET, FILM COATED ORAL
COMMUNITY
Start: 2019-10-28 | End: 2019-11-11 | Stop reason: ALTCHOICE

## 2019-10-29 RX ORDER — RANITIDINE 150 MG/1
150 TABLET ORAL 2 TIMES DAILY
COMMUNITY
Start: 2019-10-03 | End: 2019-11-14 | Stop reason: HOSPADM

## 2019-10-29 RX ORDER — POLYETHYLENE GLYCOL 3350 17 G/17G
17 POWDER, FOR SOLUTION ORAL DAILY PRN
COMMUNITY
Start: 2019-10-28 | End: 2019-11-14 | Stop reason: HOSPADM

## 2019-10-29 NOTE — TELEPHONE ENCOUNTER
Patients insurance nurse Maggie Asher from [facility] is calling to schedule patient a sooner appointment  Called patient left VM to call back  Please schedule patient with Rand Lopez PA-C this week if he calls back  Dr Dahl has no openings

## 2019-10-29 NOTE — TELEPHONE ENCOUNTER
Dr Jackson Rogers from [facility] called for Patient Medical records  I told her we needed a signed Records Release from Patient

## 2019-10-30 ENCOUNTER — TELEPHONE (OUTPATIENT)
Dept: GASTROENTEROLOGY | Facility: CLINIC | Age: 39
End: 2019-10-30

## 2019-10-30 ENCOUNTER — OFFICE VISIT (OUTPATIENT)
Dept: GASTROENTEROLOGY | Facility: CLINIC | Age: 39
End: 2019-10-30
Payer: COMMERCIAL

## 2019-10-30 VITALS
WEIGHT: 276 LBS | SYSTOLIC BLOOD PRESSURE: 132 MMHG | HEART RATE: 66 BPM | DIASTOLIC BLOOD PRESSURE: 84 MMHG | BODY MASS INDEX: 43.32 KG/M2 | HEIGHT: 67 IN

## 2019-10-30 DIAGNOSIS — K86.3 PSEUDOCYST OF PANCREAS: Primary | ICD-10-CM

## 2019-10-30 DIAGNOSIS — K85.90 RECURRENT PANCREATITIS: ICD-10-CM

## 2019-10-30 PROCEDURE — 99214 OFFICE O/P EST MOD 30 MIN: CPT | Performed by: PHYSICIAN ASSISTANT

## 2019-10-30 NOTE — PROGRESS NOTES
[facility]  Heidy Abraham 37 y o  male MRN: [id number]       CC: Follow-up     HPI:  Meron Young is a 57-year-old male who is known to us for history of recurrent pancreatitis complicated by pseudocyst   His pancreatitis was thought to be secondary to history of smoking and obesity  He also has history of GERD, chronic methadone use, hypertension, and CAD  He is not on any anticoagulation  Patient is here for follow-up  He was hospitalized in late September with nausea and vomiting symptoms secondary to a pseudocyst causing extrinsic compression into the stomach  After discharge, the patient had EUS with Dr Rob Curiel  During the procedure, it was noted that he had a cyst measuring 60 mm x 42 mm  This was therapeutically drained  There was an additional cyst measuring 32 mm x 28 mm, that was also drain  After the procedure, the patient was seen in the emergency room  A CT scan was performed at that time  The patient was noted to have a persistent cystic structure within the stomach  There was marked  Improvement of ton pancreatic inflammation and decrease focal thickening in the stomach as well  Patient reports that despite having EUS, he is still having nausea and vomiting symptoms  He reports that these are inconsistent  He is also having upper abdominal pain  Review of Systems:    CONSTITUTIONAL: Denies any fever, chills, or rigors  Good appetite, and no recent weight loss  HEENT: No earache or tinnitus  Denies hearing loss or visual disturbances  CARDIOVASCULAR: No chest pain or palpitations  RESPIRATORY: Denies any cough, hemoptysis, shortness of breath or dyspnea on exertion  GASTROINTESTINAL: As noted in the History of Present Illness  GENITOURINARY: No problems with urination  Denies any hematuria or dysuria  NEUROLOGIC: No dizziness or vertigo, denies headaches  MUSCULOSKELETAL: Denies any muscle or joint pain  SKIN: Denies skin rashes or itching     ENDOCRINE: Denies excessive thirst  Denies intolerance to heat or cold  PSYCHOSOCIAL: Denies depression or anxiety  Denies any recent memory loss  Current Outpatient Medications   Medication Sig Dispense Refill    atenolol (TENORMIN) 100 mg tablet       ciprofloxacin (CIPRO) 500 mg tablet       dicyclomine (BENTYL) 20 mg tablet Take 1 tablet (20 mg total) by mouth 2 (two) times a day 20 tablet 0    methadone (DOLOPHINE) 10 mg/mL oral concentrated solution Take 100 mg by mouth daily in the early morning       metoclopramide (REGLAN) 5 mg tablet Take 1 tablet (5 mg total) by mouth 4 (four) times a day as needed (nausea) If symptoms still persist may increase to 10 mg 4 times a day p r n  Per GI instruction 240 tablet 0    omeprazole (PriLOSEC) 40 MG capsule       polyethylene glycol (GLYCOLAX) powder       pregabalin (LYRICA) 100 mg capsule       prochlorperazine (COMPAZINE) 25 mg suppository Insert 1 suppository (25 mg total) into the rectum every 12 (twelve) hours as needed for nausea or vomiting 12 suppository 0    ranitidine (ZANTAC) 150 mg tablet       atenolol (TENORMIN) 50 mg tablet Take 100 mg by mouth daily       methadone (DOLOPHINE) 10 mg/mL oral concentrated solution Take 100 mg by mouth      ondansetron (ZOFRAN-ODT) 4 mg disintegrating tablet Take 1 tablet (4 mg total) by mouth every 8 (eight) hours as needed for nausea or vomiting (Patient not taking: Reported on 10/30/2019) 20 tablet 0    promethazine (PHENERGAN) 25 mg tablet        No current facility-administered medications for this visit        Past Medical History:   Diagnosis Date    Back pain     Cardiac disease     Chronic pain disorder     GERD (gastroesophageal reflux disease)     Hypertension     My[address] infarction [address] [address] )     Neck pain      Past Surgical History:   Procedure Laterality Date    BACK SURGERY      HERNIA REPAIR      HERNIA REPAIR  2018    KNEE SURGERY      NECK SURGERY  2005     Social History Socioeconomic History    Marital status: /Civil Union     Spouse name: None    Number of children: None    Years of education: None    Highest education level: None   Occupational History    None   Social Needs    Financial resource strain: None    Food insecurity:     Worry: None     Inability: None    Transportation needs:     Medical: None     Non-medical: None   Tobacco Use    Smoking status: Current Every Day Smoker     Packs/day: 0 25    Smokeless tobacco: Never Used   Substance and Sexual Activity    Alcohol use: Not Currently    Drug use: Never    Sexual activity: Not Currently   Lifestyle    Physical activity:     Days per week: None     Minutes per session: None    Stress: None   Relationships    Social connections:     Talks on phone: None     Gets together: None     Attends Yarsanism service: None     Active member of club or organization: None     Attends meetings of clubs or organizations: None     Relationship status: None    Intimate partner violence:     Fear of current or ex partner: None     Emotionally abused: None     Physically abused: None     Forced sexual activity: None   Other Topics Concern    None   Social History Narrative    None     Family History   Problem Relation Age of Onset    Diabetes Mother     Heart disease Mother     Cancer Father     No Known Problems Brother     Aneurysm Maternal Grandmother     No Known Problems Maternal Grandfather     No Known Problems Paternal Grandmother     No Known Problems Paternal Grandfather             PHYSICAL EXAM:    Vitals:    10/30/19 0805   BP: 132/84   Pulse: 66   Weight: 125 kg (276 lb)   Height: 5' 7" (1 702 m)     General Appearance:   Alert and oriented x 3   Cooperative, and in no respiratory distress   HEENT:   Normocephalic, atraumatic, anicteric      Neck:  Supple, symmetrical, trachea midline   Lungs:   Clear to auscultation bilaterally    Heart[de-identified]   Regular rate and rhythm   Abdomen:   Soft, non-tender, non-distended; normal bowel sounds; no masses, no organomegaly    Genitalia:   Deferred    Rectal:   Deferred    Extremities:  No cyanosis, clubbing or edema    Pulses:  2+ and symmetric all extremities    Skin:  Skin color, texture, turgor normal, no rashes or lesions    Lymph nodes:  No palpable cervical or supraclavicular lymphadenopathy        Lab Results:   Results from last 6 Months   Lab Units 10/08/19  1737   WBC Thousand/uL 11 47*   HEMOGLOBIN g/dL 13 4   HEMATOCRIT % 42 6   PLATELETS Thousands/uL 252   NEUTROS PCT % 87*   LYMPHS PCT % 10*   MONOS PCT % 3*   EOS PCT % 0     Results from last 6 Months   Lab Units 10/08/19  1738   POTASSIUM mmol/L 3 9   CHLORIDE mmol/L 99*   CO2 mmol/L 24   BUN mg/dL 5   CREATININE mg/dL 0 77   CALCIUM mg/dL 9 3   ALK PHOS U/L 88   ALT U/L 16   AST U/L 18     Results from last 6 Months   Lab Units 09/25/19  0014   INR  1 10     Results from last 6 Months   Lab Units 10/08/19  1738   LIPASE u/L 327       Imaging Studies: I have personally reviewed pertinent imaging studies  Ct Abdomen Pelvis Wo Contrast    Result Date: 10/8/2019  Impression: 1  Decreased in focal thickening of the antrum of the stomach which may be due to gastritis and decreased inflammatory change of the duodenum when compared with prior exam   Correlate with endoscopic findings  2   Persistent cystic structure within the stomach wall likely due to pseudocyst  3   No evidence of bowel obstruction  4   Inflammatory change about the pancreatic tail markedly improved since prior examination  Correlate with lipase for pancreatitis  5   Hepatic steatosis  6   Stable 2 cm left adrenal nodule  Workstation performed: [id number]       ASSESSMENT and PLAN:      1) Nausea, vomiting and upper abdominal pain with history of pancreatic pseudocyst causing extrinsic compression - Patient is still having symptoms despite having EUS with therapeutic drainage on 10/02    He had a CT scan shortly after this in the emergency room that showed a persistent pseudocyst   He is tolerating some oral intake at this point  He has been taking Reglan, which does temporize his symptoms   - Discussed the case with Dr Mike Coello  - We will send him for CT scan   - Depending on above results, he may likely need a repeat EUS  - He will continue Reglan for now        Follow up after CT

## 2019-10-30 NOTE — TELEPHONE ENCOUNTER
Please let patient know that I spoke with Dr Vero Sapp  I would like him to get CT first before we schedule for another EUS per my conversation with Dr Vero Sapp  Please provide him with the central scheduling number  Thank you!

## 2019-10-30 NOTE — TELEPHONE ENCOUNTER
Spoke with patient advised patient per Giselle Snyder previous note  He understands and will call central scheduling to schedule CT scan

## 2019-11-11 ENCOUNTER — APPOINTMENT (EMERGENCY)
Dept: CT IMAGING | Facility: HOSPITAL | Age: 39
DRG: 282 | End: 2019-11-11
Payer: COMMERCIAL

## 2019-11-11 ENCOUNTER — HOSPITAL ENCOUNTER (INPATIENT)
Facility: HOSPITAL | Age: 39
LOS: 2 days | Discharge: HOME | DRG: 282 | End: 2019-11-14
Attending: EMERGENCY MEDICINE | Admitting: FAMILY MEDICINE
Payer: COMMERCIAL

## 2019-11-11 DIAGNOSIS — K86.2 PANCREATIC CYST: ICD-10-CM

## 2019-11-11 DIAGNOSIS — K85.90 ACUTE PANCREATITIS: Primary | ICD-10-CM

## 2019-11-11 DIAGNOSIS — K85.90 ACUTE ON CHRONIC PANCREATITIS (HCC): ICD-10-CM

## 2019-11-11 DIAGNOSIS — F11.20 OPIATE DEPENDENCE, CONTINUOUS (HCC): ICD-10-CM

## 2019-11-11 DIAGNOSIS — K86.1 ACUTE ON CHRONIC PANCREATITIS (HCC): ICD-10-CM

## 2019-11-11 PROBLEM — M50.30 DDD (DEGENERATIVE DISC DISEASE), CERVICAL: Status: ACTIVE | Noted: 2019-11-11

## 2019-11-11 PROBLEM — M19.90 DJD (DEGENERATIVE JOINT DISEASE): Status: ACTIVE | Noted: 2019-11-11

## 2019-11-11 PROBLEM — E11.9 DIABETES MELLITUS WITHOUT COMPLICATION (HCC): Status: ACTIVE | Noted: 2019-06-15

## 2019-11-11 LAB
ALBUMIN SERPL BCP-MCNC: 3.1 G/DL (ref 3.5–5)
ALP SERPL-CCNC: 131 U/L (ref 46–116)
ALT SERPL W P-5'-P-CCNC: 13 U/L (ref 12–78)
ANION GAP SERPL CALCULATED.3IONS-SCNC: 14 MMOL/L (ref 4–13)
AST SERPL W P-5'-P-CCNC: 16 U/L (ref 5–45)
BASOPHILS # BLD AUTO: 0.01 THOUSANDS/ΜL (ref 0–0.1)
BASOPHILS NFR BLD AUTO: 0 % (ref 0–1)
BILIRUB SERPL-MCNC: 0.4 MG/DL (ref 0.2–1)
BILIRUB UR QL STRIP: ABNORMAL
BUN SERPL-MCNC: 6 MG/DL (ref 5–25)
CALCIUM SERPL-MCNC: 9.5 MG/DL (ref 8.3–10.1)
CHLORIDE SERPL-SCNC: 100 MMOL/L (ref 100–108)
CLARITY UR: CLEAR
CO2 SERPL-SCNC: 25 MMOL/L (ref 21–32)
COLOR UR: YELLOW
CREAT SERPL-MCNC: 0.67 MG/DL (ref 0.6–1.3)
EOSINOPHIL # BLD AUTO: 0.06 THOUSAND/ΜL (ref 0–0.61)
EOSINOPHIL NFR BLD AUTO: 1 % (ref 0–6)
ERYTHROCYTE [DISTWIDTH] IN BLOOD BY AUTOMATED COUNT: 15.3 % (ref 11.6–15.1)
GFR SERPL CREATININE-BSD FRML MDRD: 119 ML/MIN/1.73SQ M
GLUCOSE SERPL-MCNC: 119 MG/DL (ref 65–140)
GLUCOSE UR STRIP-MCNC: NEGATIVE MG/DL
HCT VFR BLD AUTO: 43.6 % (ref 36.5–49.3)
HGB BLD-MCNC: 13.6 G/DL (ref 12–17)
HGB UR QL STRIP.AUTO: NEGATIVE
IMM GRANULOCYTES # BLD AUTO: 0.05 THOUSAND/UL (ref 0–0.2)
IMM GRANULOCYTES NFR BLD AUTO: 1 % (ref 0–2)
KETONES UR STRIP-MCNC: ABNORMAL MG/DL
LACTATE SERPL-SCNC: 1.3 MMOL/L (ref 0.5–2)
LEUKOCYTE ESTERASE UR QL STRIP: NEGATIVE
LIPASE SERPL-CCNC: 507 U/L (ref 73–393)
LYMPHOCYTES # BLD AUTO: 1.49 THOUSANDS/ΜL (ref 0.6–4.47)
LYMPHOCYTES NFR BLD AUTO: 14 % (ref 14–44)
MCH RBC QN AUTO: 25.7 PG (ref 26.8–34.3)
MCHC RBC AUTO-ENTMCNC: 31.2 G/DL (ref 31.4–37.4)
MCV RBC AUTO: 82 FL (ref 82–98)
MONOCYTES # BLD AUTO: 0.41 THOUSAND/ΜL (ref 0.17–1.22)
MONOCYTES NFR BLD AUTO: 4 % (ref 4–12)
NEUTROPHILS # BLD AUTO: 8.61 THOUSANDS/ΜL (ref 1.85–7.62)
NEUTS SEG NFR BLD AUTO: 80 % (ref 43–75)
NITRITE UR QL STRIP: NEGATIVE
NRBC BLD AUTO-RTO: 0 /100 WBCS
PH UR STRIP.AUTO: 8.5 [PH]
PLATELET # BLD AUTO: 287 THOUSANDS/UL (ref 149–390)
PLATELET # BLD AUTO: 303 THOUSANDS/UL (ref 149–390)
PMV BLD AUTO: 10.3 FL (ref 8.9–12.7)
PMV BLD AUTO: 10.3 FL (ref 8.9–12.7)
POTASSIUM SERPL-SCNC: 3.9 MMOL/L (ref 3.5–5.3)
PROT SERPL-MCNC: 7.7 G/DL (ref 6.4–8.2)
PROT UR STRIP-MCNC: NEGATIVE MG/DL
RBC # BLD AUTO: 5.29 MILLION/UL (ref 3.88–5.62)
SODIUM SERPL-SCNC: 139 MMOL/L (ref 136–145)
SP GR UR STRIP.AUTO: 1.01 (ref 1–1.03)
TRIGL SERPL-MCNC: 132 MG/DL
UROBILINOGEN UR QL STRIP.AUTO: 1 E.U./DL
WBC # BLD AUTO: 10.63 THOUSAND/UL (ref 4.31–10.16)

## 2019-11-11 PROCEDURE — 99285 EMERGENCY DEPT VISIT HI MDM: CPT | Performed by: PHYSICIAN ASSISTANT

## 2019-11-11 PROCEDURE — 99285 EMERGENCY DEPT VISIT HI MDM: CPT

## 2019-11-11 PROCEDURE — 99214 OFFICE O/P EST MOD 30 MIN: CPT | Performed by: INTERNAL MEDICINE

## 2019-11-11 PROCEDURE — 85049 AUTOMATED PLATELET COUNT: CPT | Performed by: INTERNAL MEDICINE

## 2019-11-11 PROCEDURE — 74176 CT ABD & PELVIS W/O CONTRAST: CPT

## 2019-11-11 PROCEDURE — 96374 THER/PROPH/DIAG INJ IV PUSH: CPT

## 2019-11-11 PROCEDURE — 96361 HYDRATE IV INFUSION ADD-ON: CPT

## 2019-11-11 PROCEDURE — 96375 TX/PRO/DX INJ NEW DRUG ADDON: CPT

## 2019-11-11 PROCEDURE — 96376 TX/PRO/DX INJ SAME DRUG ADON: CPT

## 2019-11-11 PROCEDURE — 84478 ASSAY OF TRIGLYCERIDES: CPT | Performed by: PHYSICIAN ASSISTANT

## 2019-11-11 PROCEDURE — 83690 ASSAY OF LIPASE: CPT | Performed by: PHYSICIAN ASSISTANT

## 2019-11-11 PROCEDURE — 85025 COMPLETE CBC W/AUTO DIFF WBC: CPT | Performed by: PHYSICIAN ASSISTANT

## 2019-11-11 PROCEDURE — 83605 ASSAY OF LACTIC ACID: CPT | Performed by: PHYSICIAN ASSISTANT

## 2019-11-11 PROCEDURE — 99220 PR INITIAL OBSERVATION CARE/DAY 70 MINUTES: CPT | Performed by: INTERNAL MEDICINE

## 2019-11-11 PROCEDURE — 80053 COMPREHEN METABOLIC PANEL: CPT | Performed by: PHYSICIAN ASSISTANT

## 2019-11-11 PROCEDURE — 36415 COLL VENOUS BLD VENIPUNCTURE: CPT | Performed by: PHYSICIAN ASSISTANT

## 2019-11-11 PROCEDURE — 81003 URINALYSIS AUTO W/O SCOPE: CPT | Performed by: PHYSICIAN ASSISTANT

## 2019-11-11 RX ORDER — ONDANSETRON 2 MG/ML
4 INJECTION INTRAMUSCULAR; INTRAVENOUS ONCE
Status: COMPLETED | OUTPATIENT
Start: 2019-11-11 | End: 2019-11-11

## 2019-11-11 RX ORDER — HEPARIN SODIUM 5000 [USP'U]/ML
5000 INJECTION, SOLUTION INTRAVENOUS; SUBCUTANEOUS EVERY 8 HOURS SCHEDULED
Status: DISCONTINUED | OUTPATIENT
Start: 2019-11-11 | End: 2019-11-14 | Stop reason: HOSPADM

## 2019-11-11 RX ORDER — HYDROMORPHONE HCL/PF 1 MG/ML
1 SYRINGE (ML) INJECTION ONCE
Status: COMPLETED | OUTPATIENT
Start: 2019-11-11 | End: 2019-11-11

## 2019-11-11 RX ORDER — PROMETHAZINE HYDROCHLORIDE 25 MG/ML
12.5 INJECTION, SOLUTION INTRAMUSCULAR; INTRAVENOUS ONCE
Status: COMPLETED | OUTPATIENT
Start: 2019-11-11 | End: 2019-11-11

## 2019-11-11 RX ORDER — ONDANSETRON 2 MG/ML
4 INJECTION INTRAMUSCULAR; INTRAVENOUS EVERY 6 HOURS PRN
Status: DISCONTINUED | OUTPATIENT
Start: 2019-11-11 | End: 2019-11-14 | Stop reason: HOSPADM

## 2019-11-11 RX ORDER — HYDROMORPHONE HCL 110MG/55ML
2.5 PATIENT CONTROLLED ANALGESIA SYRINGE INTRAVENOUS
Status: DISCONTINUED | OUTPATIENT
Start: 2019-11-11 | End: 2019-11-12

## 2019-11-11 RX ORDER — PROMETHAZINE HYDROCHLORIDE 25 MG/ML
12.5 INJECTION, SOLUTION INTRAMUSCULAR; INTRAVENOUS EVERY 4 HOURS PRN
Status: DISCONTINUED | OUTPATIENT
Start: 2019-11-11 | End: 2019-11-13

## 2019-11-11 RX ORDER — METHADONE HYDROCHLORIDE 10 MG/ML
100 CONCENTRATE ORAL DAILY
Status: DISCONTINUED | OUTPATIENT
Start: 2019-11-11 | End: 2019-11-14 | Stop reason: HOSPADM

## 2019-11-11 RX ORDER — SODIUM CHLORIDE, SODIUM LACTATE, POTASSIUM CHLORIDE, CALCIUM CHLORIDE 600; 310; 30; 20 MG/100ML; MG/100ML; MG/100ML; MG/100ML
125 INJECTION, SOLUTION INTRAVENOUS CONTINUOUS
Status: DISCONTINUED | OUTPATIENT
Start: 2019-11-11 | End: 2019-11-14 | Stop reason: HOSPADM

## 2019-11-11 RX ORDER — METOCLOPRAMIDE HYDROCHLORIDE 5 MG/ML
10 INJECTION INTRAMUSCULAR; INTRAVENOUS EVERY 6 HOURS PRN
Status: DISCONTINUED | OUTPATIENT
Start: 2019-11-11 | End: 2019-11-11

## 2019-11-11 RX ORDER — HYDROMORPHONE HCL 110MG/55ML
2 PATIENT CONTROLLED ANALGESIA SYRINGE INTRAVENOUS
Status: DISCONTINUED | OUTPATIENT
Start: 2019-11-11 | End: 2019-11-11

## 2019-11-11 RX ADMIN — HYDROMORPHONE HYDROCHLORIDE 1 MG: 1 INJECTION, SOLUTION INTRAMUSCULAR; INTRAVENOUS; SUBCUTANEOUS at 06:54

## 2019-11-11 RX ADMIN — ONDANSETRON 4 MG: 2 INJECTION INTRAMUSCULAR; INTRAVENOUS at 19:21

## 2019-11-11 RX ADMIN — HYDROMORPHONE HYDROCHLORIDE 2 MG: 2 INJECTION, SOLUTION INTRAMUSCULAR; INTRAVENOUS; SUBCUTANEOUS at 14:22

## 2019-11-11 RX ADMIN — SODIUM CHLORIDE, SODIUM LACTATE, POTASSIUM CHLORIDE, AND CALCIUM CHLORIDE 250 ML/HR: .6; .31; .03; .02 INJECTION, SOLUTION INTRAVENOUS at 21:31

## 2019-11-11 RX ADMIN — METOCLOPRAMIDE 10 MG: 5 INJECTION, SOLUTION INTRAMUSCULAR; INTRAVENOUS at 09:27

## 2019-11-11 RX ADMIN — HYDROMORPHONE HYDROCHLORIDE 2.5 MG: 2 INJECTION, SOLUTION INTRAMUSCULAR; INTRAVENOUS; SUBCUTANEOUS at 20:23

## 2019-11-11 RX ADMIN — HYDROMORPHONE HYDROCHLORIDE 2.5 MG: 2 INJECTION, SOLUTION INTRAMUSCULAR; INTRAVENOUS; SUBCUTANEOUS at 23:31

## 2019-11-11 RX ADMIN — ONDANSETRON 4 MG: 2 INJECTION INTRAMUSCULAR; INTRAVENOUS at 06:52

## 2019-11-11 RX ADMIN — PROMETHAZINE HYDROCHLORIDE 12.5 MG: 25 INJECTION INTRAMUSCULAR; INTRAVENOUS at 23:27

## 2019-11-11 RX ADMIN — METHADONE HYDROCHLORIDE 100 MG: 10 CONCENTRATE ORAL at 12:29

## 2019-11-11 RX ADMIN — HYDROMORPHONE HYDROCHLORIDE 1 MG: 1 INJECTION, SOLUTION INTRAMUSCULAR; INTRAVENOUS; SUBCUTANEOUS at 16:54

## 2019-11-11 RX ADMIN — SODIUM CHLORIDE 1000 ML: 0.9 INJECTION, SOLUTION INTRAVENOUS at 06:55

## 2019-11-11 RX ADMIN — HYDROMORPHONE HYDROCHLORIDE 1 MG: 1 INJECTION, SOLUTION INTRAMUSCULAR; INTRAVENOUS; SUBCUTANEOUS at 05:32

## 2019-11-11 RX ADMIN — SODIUM CHLORIDE, SODIUM LACTATE, POTASSIUM CHLORIDE, AND CALCIUM CHLORIDE 250 ML/HR: .6; .31; .03; .02 INJECTION, SOLUTION INTRAVENOUS at 10:22

## 2019-11-11 RX ADMIN — SODIUM CHLORIDE 1000 ML: 0.9 INJECTION, SOLUTION INTRAVENOUS at 05:32

## 2019-11-11 RX ADMIN — PROMETHAZINE HYDROCHLORIDE 12.5 MG: 25 INJECTION INTRAMUSCULAR; INTRAVENOUS at 16:53

## 2019-11-11 RX ADMIN — PROMETHAZINE HYDROCHLORIDE 12.5 MG: 25 INJECTION INTRAMUSCULAR; INTRAVENOUS at 11:07

## 2019-11-11 RX ADMIN — ONDANSETRON 4 MG: 2 INJECTION INTRAMUSCULAR; INTRAVENOUS at 05:32

## 2019-11-11 NOTE — CONSULTS
Consultation - [address] [facility]  Johnny Mcdaniel 37 y o  male MRN: [id number]  Unit/Bed#: ED 28 Encounter: [id number]        Consults    Reason for Consult / Principal Problem: Abdominal Pain    HPI: Samra James is a pleasant 38 yo M with a PMH of recurrent idiopathic pancreatitis complicated by pseudocyst formation, CAD, HTN, tobacco use, chronic methadone use, presenting for acute worsening of chronic abdominal pain associated with nausea, poor PO intake, and weight loss of about 100 lbs in the past 7-8 months unintentionally  He was last hospitalized at the end of September with similar symptoms due to partial gastric outlet obstruction from a large pancreatic pseudocyst  He had a EUS performed on 10/2 and his lisinopril was stopped at that time as well  The EUS showed a pseudocyst measuring 6 cm x 4 2 cm s/p FNA transduodenally, drainage appeared mucous and purulent 15 cc drained  Another cyst measuring 3 2 cm x 2 8 cm s/p FNA from gastric antrum of 8 cc milky white fluid  Changes consistent with chronic pancreatitis noted  He reports after the EUS he had significant pain related to the drainage sites and continued to have pain as well as nausea and vomiting  He could tolerate PO maybe 2 days out of a week  He has lost 100 lbs unintentionally over the past 7-8 months due to these same symptoms  He does not drink alcohol  He has not started any new medications  His father has cancer, reports this is gastric, pancreatic, and in the brain but unclear primary      REVIEW OF SYSTEMS: Negative except for as stated above    Historical Information   Past Medical History:   Diagnosis Date    Back pain     Cardiac disease     Chronic pain disorder     GERD (gastroesophageal reflux disease)     Hypertension     Myocardial infarction ([facility] [address] )     Neck pain      Past Surgical History:   Procedure Laterality Date    BACK SURGERY      HERNIA REPAIR      HERNIA REPAIR  2018    KNEE SURGERY      NECK SURGERY 2005     Social History   Social History     Substance and Sexual Activity   Alcohol Use Not Currently     Social History     Substance and Sexual Activity   Drug Use Never     Social History     Tobacco Use   Smoking Status Current Every Day Smoker    Packs/day: 0 25   Smokeless Tobacco Never Used     Family History   Problem Relation Age of Onset    Diabetes Mother     Heart disease Mother     Cancer Father     No Known Problems Brother     Aneurysm Maternal Grandmother     No Known Problems Maternal Grandfather     No Known Problems Paternal Grandmother     No Known Problems Paternal Grandfather        Meds/Allergies       (Not in a hospital admission)  Current Facility-Administered Medications   Medication Dose Route Frequency    heparin (porcine) subcutaneous injection 5,000 Units  5,000 Units Subcutaneous Q8H [address]    HYDROmorphone (DILAUDID) injection 1 mg  1 mg Intravenous Once    HYDROmorphone (DILAUDID) injection 2 5 mg  2 5 mg Intravenous Q3H PRN    lactated ringers infusion  250 mL/hr Intravenous Continuous    methadone (DOLOPHINE) 10 mg/mL oral concentrated solution 100 mg  100 mg Oral Daily    ondansetron (ZOFRAN) injection 4 mg  4 mg Intravenous Q6H PRN    promethazine (PHENERGAN) injection 12 5 mg  12 5 mg Intravenous Q4H PRN       Allergies   Allergen Reactions    Iodinated Diagnostic Agents     Ketorolac Tromethamine      Other reaction(s): Other (Please comment)  Pt goes into shock if given    Penicillins Hives     Other reaction(s): Other (Please comment)  Trouble breathing    Shellfish Allergy     Varenicline Hives           Objective     Blood pressure 138/73, pulse 62, temperature 98 1 °F (36 7 °C), temperature source Oral, resp  rate 14, height 5' 8" (1 727 m), weight 121 kg (265 lb 14 oz), SpO2 97 %        Intake/Output Summary (Last 24 hours) at 11/11/2019 1646  Last data filed at 11/11/2019 1019  Gross per 24 hour   Intake 2000 ml   Output    Net 2000 ml         PHYSICAL EXAM:      General Appearance:   Alert, cooperative, no distress, appears stated age    HEENT:   Normocephalic, atraumatic, anicteric      Neck:  Supple, symmetrical, trachea midline   Lungs:   Clear to auscultation bilaterally, no respiratory distress   Heart[de-identified]   S1 and S2 normal; regular rate and rhythm; no murmur, rub, or gallop  Abdomen:   Non-distended, soft, BS active, (+) TTP in the epigastric region   Rectal:   Deferred    Extremities:  No cyanosis, clubbing or edema    Pulses:  2+ and symmetric all extremities    Skin:  No jaundice or pallor, no rashes or lesions      Lab Results:   Results from last 7 days   Lab Units 11/11/19  0927 11/11/19  0532   WBC Thousand/uL  --  10 63*   HEMOGLOBIN g/dL  --  13 6   HEMATOCRIT %  --  43 6   PLATELETS Thousands/uL 287 303   NEUTROS PCT %  --  80*   LYMPHS PCT %  --  14   MONOS PCT %  --  4   EOS PCT %  --  1     Results from last 7 days   Lab Units 11/11/19  0532   POTASSIUM mmol/L 3 9   CHLORIDE mmol/L 100   CO2 mmol/L 25   BUN mg/dL 6   CREATININE mg/dL 0 67   CALCIUM mg/dL 9 5   ALK PHOS U/L 131*   ALT U/L 13   AST U/L 16         Results from last 7 days   Lab Units 11/11/19  0532   LIPASE u/L 507*       Imaging Studies: I have personally reviewed pertinent imaging studies  Ct Abdomen Pelvis Wo Contrast  Result Date: 11/11/2019  Impression: Findings suggestive of mild acute or subacute pancreatitis  New 4 5 x 2 5 x 3 cm fluid collection anterior to the proximal jejunum immediately distal to the ligament of Treitz attributed to complex pancreatic pseudocyst  New additional potentially communicating smaller collections in the left upper abdomen, the larger of which measures 3 4 x 2 3 x 2 2 cm anterior to the proximal descending colon  The smaller collection immediately superolateral to the dominant collection adjacent to the proximal jejunum measures 1 4 x 1 x 1 4 cm also attributed to complex pancreatic pseudocysts   Interval decreased size of proximal gastric intramural fluid collection currently approximately 1 4 x 0 8 x 0 8 cm and previously 2 8 x 2 x 2 4 cm  ASSESSMENT and PLAN:      Multiple Pancreatic Pseudocysts  Recurrent Idiopathic Pancreatitis  Unintentional Weight Loss  - CT A/P on admission with multiple pseudocysts present, new 4 5 x 2 5 x 3 cm fluid collection anterior to proximal jejunum, multiple new smaller collections in the left upper abdomen the largest measuring 3 4 x 2 3 x 2 2 next to the proximal descending colon, there is decrease in size of the gastric intramural fluid collection  - Given new pseudocysts on imaging, suspect he is having recurrent episodes of pancreatitis which are idiopathic  - No alcohol abuse, RUQ US previously negative for cholelithiasis/sludge, TG normal in the past  - Recheck TG, check IgG4  - LR at 250 ml/hr  - Pain control, antiemetics PRN  - Will need to consider initiation of creon given significant weight loss and evidence of chronic pancreatitis on EUS  - Lisinopril stopped in the past for possible medication induced pancreatitis, would discontinue omeprazole and ranitidine on discharge as these can be associated with pancreatitis  - Will discuss with advanced endoscopist regarding repeat EUS to evaluate biliary ducts to rule out microlithiasis/sludge and also for possible cyst gastrostomy  - Serial abdominal exams  - Incentive spirometry and DVT prophylaxis while hospitalized  - Consider empiric cholecystectomy given recurrent idiopathic pancreatitis though this may be difficult given multiple pseudocysts present      The patient will be seen by Dr Isabell Hassan

## 2019-11-11 NOTE — ED PROVIDER NOTES
History  Chief Complaint   Patient presents with    Abdominal Pain     Mid abd pain and vomitting since yesterday, denies fevers or diarrhea, reports hx of pancreatitis     Patient is a 55-year-old male presents emergency department with complaints of abdominal pain that began yesterday  Patient has history of pancreatitis and pancreatic pseudocyst   Patient states the pain has been getting progressively worse  He states he has also nauseous with vomiting  Patient denies fever, chills, chest pain, shortness of breath  Prior to Admission Medications   Prescriptions Last Dose Informant Patient Reported? Taking?   atenolol (TENORMIN) 100 mg tablet  Self Yes No   atenolol (TENORMIN) 50 mg tablet  Self Yes No   Sig: Take 100 mg by mouth daily    ciprofloxacin (CIPRO) 500 mg tablet  Self Yes No   dicyclomine (BENTYL) 20 mg tablet  Self No No   Sig: Take 1 tablet (20 mg total) by mouth 2 (two) times a day   methadone (DOLOPHINE) 10 mg/mL oral concentrated solution  Self Yes No   Sig: Take 100 mg by mouth daily in the early morning    methadone (DOLOPHINE) 10 mg/mL oral concentrated solution  Self Yes No   Sig: Take 100 mg by mouth   metoclopramide (REGLAN) 5 mg tablet  Self No No   Sig: Take 1 tablet (5 mg total) by mouth 4 (four) times a day as needed (nausea) If symptoms still persist may increase to 10 mg 4 times a day p r n   Per GI instruction   omeprazole (PriLOSEC) 40 MG capsule  Self Yes No   ondansetron (ZOFRAN-ODT) 4 mg disintegrating tablet  Self No No   Sig: Take 1 tablet (4 mg total) by mouth every 8 (eight) hours as needed for nausea or vomiting   Patient not taking: Reported on 10/30/2019   polyethylene glycol (GLYCOLAX) powder  Self Yes No   pregabalin (LYRICA) 100 mg capsule  Self Yes No   prochlorperazine (COMPAZINE) 25 mg suppository  Self No No   Sig: Insert 1 suppository (25 mg total) into the rectum every 12 (twelve) hours as needed for nausea or vomiting   promethazine (PHENERGAN) 25 mg tablet  Self Yes No   ranitidine (ZANTAC) 150 mg tablet  Self Yes No      Facility-Administered Medications: None       Past Medical History:   Diagnosis Date    Back pain     Cardiac disease     Chronic pain disorder     GERD (gastroesophageal reflux disease)     Hypertension     Myocardial infarction ([address] )     Neck pain        Past Surgical History:   Procedure Laterality Date    BACK SURGERY      HERNIA REPAIR      HERNIA REPAIR  2018    KNEE SURGERY      NECK SURGERY  2005       Family History   Problem Relation Age of Onset    Diabetes Mother     Heart disease Mother     Cancer Father     No Known Problems Brother     Aneurysm Maternal Grandmother     No Known Problems Maternal Grandfather     No Known Problems Paternal Grandmother     No Known Problems Paternal Grandfather      I have reviewed and agree with the history as documented  Social History     Tobacco Use    Smoking status: Current Every Day Smoker     Packs/day: 0 25    Smokeless tobacco: Never Used   Substance Use Topics    Alcohol use: Not Currently    Drug use: Never        Review of Systems   Constitutional: Negative for fever  Respiratory: Negative for shortness of breath  Cardiovascular: Negative for chest pain  Gastrointestinal: Positive for abdominal pain, nausea and vomiting  All other systems reviewed and are negative  Physical Exam  Physical Exam   Constitutional: He is oriented to person, place, and time  He appears well-developed and well-nourished  HENT:   Head: Normocephalic and atraumatic  Eyes: Pupils are equal, round, and reactive to light  EOM are normal    Cardiovascular: Normal rate, normal heart sounds and intact distal pulses  Pulmonary/Chest: Effort normal and breath sounds normal    Abdominal: Soft  Normal appearance and bowel sounds are normal  There is generalized tenderness  There is rebound and guarding  Neurological: He is alert and oriented to person, place, and time  Skin: Skin is warm  Capillary refill takes less than 2 seconds  Psychiatric: He has a normal mood and affect  His behavior is normal    Vitals reviewed  Vital Signs  ED Triage Vitals [11/11/19 0456]   Temperature Pulse Respirations Blood Pressure SpO2   98 1 °F (36 7 °C) 70 18 147/71 98 %      Temp Source Heart Rate Source Patient Position - Orthostatic VS BP Location FiO2 (%)   Oral Monitor Lying Right arm --      Pain Score       8           Vitals:    11/11/19 0456 11/11/19 0656   BP: 147/71 149/83   Pulse: 70 69   Patient Position - Orthostatic VS: Lying Sitting         Visual Acuity      ED Medications  Medications   sodium chloride 0 9 % bolus 1,000 mL (1,000 mL Intravenous New Bag 11/11/19 0532)   sodium chloride 0 9 % bolus 1,000 mL (1,000 mL Intravenous New Bag 11/11/19 0655)   ondansetron (ZOFRAN) injection 4 mg (4 mg Intravenous Given 11/11/19 0532)   HYDROmorphone (DILAUDID) injection 1 mg (1 mg Intravenous Given 11/11/19 0532)   HYDROmorphone (DILAUDID) injection 1 mg (1 mg Intravenous Given 11/11/19 0654)   ondansetron (ZOFRAN) injection 4 mg (4 mg Intravenous Given 11/11/19 0652)       Diagnostic Studies  Results Reviewed     Procedure Component Value Units Date/Time    Lactic acid, plasma [[id number]]  (Normal) Collected:  11/11/19 0532    Lab Status:  Final result Specimen:  Blood from Arm, Right Updated:  11/11/19 7508     LACTIC ACID 1 3 mmol/L     Narrative:       Result may be elevated if tourniquet was used during collection      CMP [[id number]]  (Abnormal) Collected:  11/11/19 0532    Lab Status:  Final result Specimen:  Blood from Arm, Right Updated:  11/11/19 0604     Sodium 139 mmol/L      Potassium 3 9 mmol/L      Chloride 100 mmol/L      CO2 25 mmol/L      ANION GAP 14 mmol/L      BUN 6 mg/dL      Creatinine 0 67 mg/dL      Glucose 119 mg/dL      Calcium 9 5 mg/dL      AST 16 U/L      ALT 13 U/L      Alkaline Phosphatase 131 U/L      Total Protein 7 7 g/dL      Albumin 3 1 g/dL Total Bilirubin 0 40 mg/dL      eGFR 119 ml/min/1 73sq m     Narrative:       [place] guidelines for Chronic Kidney Disease (CKD):     Stage 1 with normal or high GFR (GFR > 90 mL/min/1 73 square meters)    Stage 2 Mild CKD (GFR = 60-89 mL/min/1 73 square meters)    Stage 3A Moderate CKD (GFR = 45-59 mL/min/1 73 square meters)    Stage 3B Moderate CKD (GFR = 30-44 mL/min/1 73 square meters)    Stage 4 Severe CKD (GFR = 15-29 mL/min/1 73 square meters)    Stage 5 End Stage CKD (GFR <15 mL/min/1 73 square meters)  Note: GFR calculation is accurate only with a steady state creatinine    Lipase [[id number]]  (Abnormal) Collected:  11/11/19 0532    Lab Status:  Final result Specimen:  Blood from Arm, Right Updated:  11/11/19 0604     Lipase 507 u/L     CBC and differential [[id number]]  (Abnormal) Collected:  11/11/19 0532    Lab Status:  Final result Specimen:  Blood from Arm, Right Updated:  11/11/19 0548     WBC 10 63 Thousand/uL      RBC 5 29 Million/uL      Hemoglobin 13 6 g/dL      Hematocrit 43 6 %      MCV 82 fL      MCH 25 7 pg      MCHC 31 2 g/dL      RDW 15 3 %      MPV 10 3 fL      Platelets 905 Thousands/uL      nRBC 0 /100 WBCs      Neutrophils Relative 80 %      Immat GRANS % 1 %      Lymphocytes Relative 14 %      Monocytes Relative 4 %      Eosinophils Relative 1 %      Basophils Relative 0 %      Neutrophils Absolute 8 61 Thousands/µL      Immature Grans Absolute 0 05 Thousand/uL      Lymphocytes Absolute 1 49 Thousands/µL      Monocytes Absolute 0 41 Thousand/µL      Eosinophils Absolute 0 06 Thousand/µL      Basophils Absolute 0 01 Thousands/µL     UA w Reflex to Microscopic w Reflex to Culture [[id number]]     Lab Status:  No result Specimen:  Urine                  CT abdomen pelvis wo contrast   Final Result by Sandy Lewis MD (11/11 0703)      Findings suggestive of mild acute or subacute pancreatitis        New 4 5 x 2 5 x 3 cm fluid collection anterior to the proximal jejunum immediately distal to the ligament of Treitz attributed to complex pancreatic pseudocyst       New additional potentially communicating smaller collections in the left upper abdomen, the larger of which measures 3 4 x 2 3 x 2 2 cm anterior to the proximal descending colon  The smaller collection immediately superolateral to the dominant    collection adjacent to the proximal jejunum measures 1 4 x 1 x 1 4 cm also attributed to complex pancreatic pseudocysts  Interval decreased size of proximal gastric intramural fluid collection currently approximately 1 4 x 0 8 x 0 8 cm and previously 2 8 x 2 x 2 4 cm  The study was marked in [facility] for immediate notification  Workstation performed: [id number]                    Procedures  Procedures       ED Course                               MDM  Number of Diagnoses or Management Options  Acute pancreatitis:   Diagnosis management comments: Patient is a 37 year male presents emergency department complaints of abdominal pain, nausea, vomiting  Patient has history of pancreatitis  Lab evaluation suggest pancreatitis with the elevated lipase  CT scan abdomen was also reviewed and also shows evidence of acute pancreatitis  There is also evidence of enlarging pseudocyst   Case was discussed with Dr Shae Contreras  Patient is receiving IV fluids, antiemetics, pain medication  He will be admitted to the hospital for further evaluation         Amount and/or Complexity of Data Reviewed  Clinical lab tests: ordered and reviewed  Tests in the radiology section of CPT®: ordered and reviewed  Review and summarize past medical records: yes  Discuss the patient with other providers: yes    Risk of Complications, Morbidity, and/or Mortality  Presenting problems: moderate  Diagnostic procedures: moderate  Management options: moderate    Patient Progress  Patient progress: stable      Disposition  Final diagnoses:   Acute pancreatitis     Time reflects when diagnosis was documented in both MDM as applicable and the Disposition within this note     Time User Action Codes Description Comment    11/11/2019  7:23 AM Constance Nancy Tadeo [[id number]] Acute pancreatitis       ED Disposition     ED Disposition Condition Date/Time Comment    Admit Stable Mon Nov 11, 2019  7:22 AM Case was discussed with Dr Kimberli Elizalde and the patient's admission status was agreed to be Admission Status: observation status to the service of Dr Kimberli Elizalde   Follow-up Information    None         Patient's Medications   Discharge Prescriptions    No medications on file     No discharge procedures on file      ED Provider  Electronically Signed by           José Serrato PA-C  11/11/19 6648

## 2019-11-11 NOTE — ED NOTES
Patient transported to [facility], [address] [facility]  11/11/19 5602

## 2019-11-11 NOTE — ASSESSMENT & PLAN NOTE
Patient has a well-known history of recurrent pancreatitis as well as pancreatic pseudocyst   This was thought to be secondary to smoking and obesity  He follows up with GI as an outpatient  He was hospitalized in late September due to symptoms of a pseudocyst causing compression on the stomach, he had an EUS after discharge, and had the cyst drained    · Continue IV fluid hydration  · NPO, hold oral meds  · GI consult  · Pain regimen  · reglan prn

## 2019-11-11 NOTE — ASSESSMENT & PLAN NOTE
Continue daily methadone  [address] Larson to inform them that patient will be receiving methadone in the hospital

## 2019-11-11 NOTE — ASSESSMENT & PLAN NOTE
As noted on previous imaging  He has had recurrent cyst in the past that have required drainage  Now there appears to be new collection per CT imaging    · Consult GI  · See plan for pancreatitis

## 2019-11-11 NOTE — ASSESSMENT & PLAN NOTE
Lab Results   Component Value Date    HGBA1C 6 0 09/05/2019       No results for input(s): POCGLU in the last 72 hours  Blood Sugar Average: Last 72 hrs:     Currently diet controlled  Patient not on medication    Will hold off on insulin and glucose checks for now

## 2019-11-11 NOTE — H&P
H&P- Luis Martinez 1980, 37 y o  male MRN: [id number]    Unit/Bed#: ED 28 Encounter: [id number]    Primary Care Provider: Roberta Sullivan DO   Date and time admitted to hospital: 11/11/2019  4:50 AM        * Acute on chronic pancreatitis [facility])  Assessment & Plan  Patient has a well-known history of recurrent pancreatitis as well as pancreatic pseudocyst   This was thought to be secondary to smoking and obesity  He follows up with GI as an outpatient  He was hospitalized in late September due to symptoms of a pseudocyst causing compression on the stomach, he had an EUS after discharge, and had the cyst drained  · Continue IV fluid hydration  · NPO, hold oral meds  · GI consult  · Pain regimen  · reglan prn    Pancreatic cyst  Assessment & Plan  As noted on previous imaging  He has had recurrent cyst in the past that have required drainage  Now there appears to be new collection per CT imaging  · Consult GI  · See plan for pancreatitis    Diabetes mellitus without complication [facility])  Assessment & Plan  Lab Results   Component Value Date    HGBA1C 6 0 09/05/2019       No results for input(s): POCGLU in the last 72 hours  Blood Sugar Average: Last 72 hrs:     Currently diet controlled  Patient not on medication    Will hold off on insulin and glucose checks for now    Prolonged Q-T interval on ECG  Assessment & Plan  Demonstrated on previous EKG, but appears resolved on latest EKG  Exercise caution with QT prolonging medications    Essential hypertension  Assessment & Plan  Continue atenolol daily when able to tolerate oral intake    Opiate dependence, continuous ([facility] [address] )  Assessment & Plan  Continue daily methadone  Miranda Larson to inform them that patient will be receiving methadone in the hospital    Tobacco use  Assessment & Plan   on cessation  Patient refused nicotine patch    Morbid obesity with BMI of 40 0-44 9, adult [facility])  Assessment & Plan   on weight loss  Consider nutrition consult        VTE Prophylaxis: Enoxaparin (Lovenox)  / reason for no mechanical VTE prophylaxis moderate risk DVT   Code Status: LVL 1 - Full Code  POLST: POLST form is not discussed and not completed at this time  Anticipated Length of Stay:  Patient will be admitted on an Observation basis with an anticipated length of stay of < 2 midnights  Justification for Hospital Stay: Please see detailed plans noted above  Chief Complaint:     Abdominal pain    History of Present Illness:  Luis Martinez is a 37 y o  male who presents with abdominal pain found to be acute pancreatitis  Patient has a well-known history of pancreatitis and has followed up with GI as an outpatient  He also has a history of pancreatic pseudocyst which was drained via EUS  Despite this, patient has had consistent problems with pancreatitis  Per GI evaluation, this was thought to be secondary to his weight and smoking  On evaluation in the ED, his initial lipase was 507  His white blood cell count was 10 63  He received Dilaudid, Zofran, and normal saline  He was admitted to the hospital for acute pancreatitis  Currently, patient is appearing uncomfortable, complaining of abdominal pain, nausea  Not in any respiratory distress  He largely denies other symptoms including chest pain, shortness of breath, fevers, dizziness, lightheadedness, numbness/tingling  Review of Systems   Constitutional: Negative for activity change, chills and fever  HENT: Negative  Negative for hearing loss, sore throat and trouble swallowing  Eyes: Negative for visual disturbance  Respiratory: Negative for cough, shortness of breath and wheezing  Cardiovascular: Negative for chest pain, palpitations and leg swelling  Gastrointestinal: Positive for abdominal pain, nausea and vomiting  Negative for abdominal distention, blood in stool, constipation and diarrhea  Endocrine: Negative      Genitourinary: Negative for dysuria, frequency and hematuria  Musculoskeletal: Negative for arthralgias, joint swelling and myalgias  Skin: Negative  Allergic/Immunologic: Negative for immunocompromised state  Neurological: Negative for dizziness, facial asymmetry, speech difficulty, weakness, numbness and headaches  Hematological: Negative  Psychiatric/Behavioral: Negative for confusion  Past Medical and Surgical History:   Past Medical History:   Diagnosis Date    Back pain     Cardiac disease     Chronic pain disorder     GERD (gastroesophageal reflux disease)     Hypertension     Myocardial infarction ([address] )     Neck pain      Past Surgical History:   Procedure Laterality Date    BACK SURGERY      HERNIA REPAIR      HERNIA REPAIR  2018    KNEE SURGERY      NECK SURGERY  2005       Meds/Allergies:    (Not in a hospital admission)    Allergies: Allergies   Allergen Reactions    Iodinated Diagnostic Agents     Ketorolac Tromethamine      Other reaction(s): Other (Please comment)  Pt goes into shock if given    Penicillins Hives     Other reaction(s):  Other (Please comment)  Trouble breathing    Shellfish Allergy     Varenicline Hives     History:  Marital Status: /Civil Union     Substance Use History:   Social History     Substance and Sexual Activity   Alcohol Use Not Currently     Social History     Tobacco Use   Smoking Status Current Every Day Smoker    Packs/day: 0 25   Smokeless Tobacco Never Used     Social History     Substance and Sexual Activity   Drug Use Never       Family History:  Family History   Problem Relation Age of Onset    Diabetes Mother     Heart disease Mother     Cancer Father     No Known Problems Brother     Aneurysm Maternal Grandmother     No Known Problems Maternal Grandfather     No Known Problems Paternal Grandmother     No Known Problems Paternal Grandfather        Physical Exam:     Vitals:   Blood Pressure: 149/83 (11/11/19 0656)  Pulse: 69 (11/11/19 7893)  Temperature: 98 1 °F (36 7 °C) (11/11/19 0456)  Temp Source: Oral (11/11/19 0456)  Respirations: 17 (11/11/19 0656)  Height: 5' 8" (172 7 cm) (11/11/19 0456)  Weight - Scale: 121 kg (265 lb 14 oz) (11/11/19 0456)  SpO2: 94 % (11/11/19 0656)    Constitutional:  Well developed, well nourished, no acute distress, uncomfortable appearing  Obese  Eyes:  PERRL, conjunctiva normal   HENT:  Atraumatic, external ears normal, nose normal, oropharynx moist, no pharyngeal exudates  Neck - normal range of motion, no tenderness, supple   Respiratory:  No respiratory distress  Normal breath sounds  No rales, no wheezing   Cardiovascular:  Normal rate, normal rhythm, no murmurs, no gallops, no rubs   GI:  Soft, nondistended, normal bowel sounds, epigastric tenderness, no organomegaly, no mass, no rebound, no guarding   :  No costovertebral angle tenderness  Musculoskeletal:  No edema, no tenderness, no deformities  Back - no tenderness  Integument:  Well hydrated, no rash   Lymphatic:  No lymphadenopathy noted   Neurologic:  Alert & awake, communicative, CN 2-12 normal, normal motor function, normal sensory function, no focal deficits noted   Psychiatric:  Speech and behavior appropriate       Lab Results: I have personally reviewed pertinent reports  Results from last 7 days   Lab Units 11/11/19  0532   WBC Thousand/uL 10 63*   HEMOGLOBIN g/dL 13 6   HEMATOCRIT % 43 6   PLATELETS Thousands/uL 303   NEUTROS PCT % 80*   LYMPHS PCT % 14   MONOS PCT % 4   EOS PCT % 1     Results from last 7 days   Lab Units 11/11/19  0532   POTASSIUM mmol/L 3 9   CHLORIDE mmol/L 100   CO2 mmol/L 25   BUN mg/dL 6   CREATININE mg/dL 0 67   CALCIUM mg/dL 9 5   ALK PHOS U/L 131*   ALT U/L 13   AST U/L 16           EKG:  Sinus rhythm    Imaging: I have personally reviewed pertinent reports     and I have personally reviewed pertinent films in PACS    Ct Abdomen Pelvis Wo Contrast    Result Date: 11/11/2019  Narrative: CT ABDOMEN AND PELVIS WITHOUT IV CONTRAST INDICATION:   Abdominal pain, acute, nonlocalized  COMPARISON:  CT abdomen and pelvis 10/8/2019 and MRI abdomen 9/5/2019 TECHNIQUE:  CT examination of the abdomen and pelvis was performed without intravenous contrast   Axial, sagittal, and coronal 2D reformatted images were created from the source data and submitted for interpretation  Radiation dose length product (DLP) for this visit:  76 310 744 mGy-cm   This examination, like all CT scans performed in the [facility], was performed utilizing techniques to minimize radiation dose exposure, including the use of iterative reconstruction and automated exposure control  Enteric contrast was administered  FINDINGS: ABDOMEN LOWER CHEST:  Dependent hypoventilatory changes  LIVER/BILIARY TREE:  Liver is diffusely decreased in density consistent with fatty change  No CT evidence of suspicious hepatic mass  Normal hepatic contours  No biliary dilatation  GALLBLADDER:  No calcified gallstones  No pericholecystic inflammatory change  SPLEEN:  Unremarkable  PANCREAS:  Trace stranding adjacent to the distal body  Otherwise stable moderate fatty infiltration of the head and uncinate process and complete fatty replacement of the distal tail of the pancreas versus hypoplasia  ADRENAL GLANDS:  Stable 2 cm left adrenal low-density nodule characterized as adrenal cyst on the prior MRI  Unremarkable right adrenal gland  KIDNEYS/URETERS:  Unremarkable  No hydronephrosis  STOMACH AND BOWEL:  Mild thickening of the proximal jejunum immediately distal to the ligament of Treitz with mild adjacent stranding  4 No bowel obstruction  Mild nonspecific thickening of the gastric body  Focal low density in the proximal wall of the lesser curve measures 1 4 x 0 8 x 0 8 cm image 28 series 2 and image 54 series 601 previously 2 8 x 2 x 2 4 cm  APPENDIX:  A normal appendix was visualized   ABDOMINOPELVIC CAVITY:  4 3 x 2 5 x 3 cm loculated fluid density immediately adjacent to the anterior wall of the proximal jejunum image 36 series 2 and image 67 series 601 is new since October 8, 2019  1 4 x 1 x 1 4 cm new low-density lesion immediately superolateral to the proximal jejunum and above-described fluid collection 32 series 2 and image 72 series 601  New lobulated low density immediately anterior to the proximal descending colon measures 3 4 x 2 3 x 2 2 cm image 75 series 601 and image 27 series 2 with potentially communicating tail between this collection and the smaller collection described above images 29 through 32 series 2 and image 71 and 72 series 601  No free gas or enlarged lymph nodes  VESSELS:  Trace calcification bilateral common iliac arteries  No abdominal aortic aneurysm  Gastrohepatic and upper abdominal collateral vessels  PELVIS REPRODUCTIVE ORGANS:  Unremarkable for patient's age  URINARY BLADDER:  Unremarkable  ABDOMINAL WALL/INGUINAL REGIONS: Subcentimeter ventral right periumbilical abdominal wall diastases containing fat  No bowel herniation  Tiny bilateral fat-containing inguinal hernias  No inguinal mass  OSSEOUS STRUCTURES:  No acute fracture or osseous destructive lesion identified  Degenerative changes of the spine, pubic symphysis, and multiple joints  Stable grade 1 retrolisthesis of L3 on L4  Posterior decompression at L4  Impression: Findings suggestive of mild acute or subacute pancreatitis  New 4 5 x 2 5 x 3 cm fluid collection anterior to the proximal jejunum immediately distal to the ligament of Treitz attributed to complex pancreatic pseudocyst  New additional potentially communicating smaller collections in the left upper abdomen, the larger of which measures 3 4 x 2 3 x 2 2 cm anterior to the proximal descending colon  The smaller collection immediately superolateral to the dominant collection adjacent to the proximal jejunum measures 1 4 x 1 x 1 4 cm also attributed to complex pancreatic pseudocysts  Interval decreased size of proximal gastric intramural fluid collection currently approximately 1 4 x 0 8 x 0 8 cm and previously 2 8 x 2 x 2 4 cm  The study was marked in [facility] for immediate notification  Workstation performed: [id number]         Portions of the record may have been created with voice recognition software  Occasional wrong word or "sound a like" substitutions may have occurred due to the inherent limitations of voice recognition software  Read the chart carefully and recognize, using context, where substitutions have occurred

## 2019-11-11 NOTE — ASSESSMENT & PLAN NOTE
Demonstrated on previous EKG, but appears resolved on latest EKG  Exercise caution with QT prolonging medications

## 2019-11-11 NOTE — ED NOTES
Trauma: No     1  CC: abdominal pain, acute pancreatitis    2  Admission related to injury? No     3  Orientation status A&O x4     4  Abnormal labs/abnormal focused assessment/abnormal vitals: CT scan showed pancreatic cyst, VS wnl     5  Medications/drips LR @ 250ml/hr, pt NPO    6  Last time narcotics given/ pain meds: IV dilaudid last dose 1700, IV phenergan 1700, has zofran ordered if phenergan does not work pain, 8/10 LUQ abdomen    7  IV lines/drains/ etc RAC 20g     8  Isolation status none    9  Skin wnl     10  Ambulation status Independent    11   ED phone 19 [facility], [address] [place] [address]  11/11/19 6382

## 2019-11-12 LAB
ALBUMIN SERPL BCP-MCNC: 2.9 G/DL (ref 3.5–5)
ALP SERPL-CCNC: 104 U/L (ref 46–116)
ALT SERPL W P-5'-P-CCNC: 10 U/L (ref 12–78)
ANION GAP SERPL CALCULATED.3IONS-SCNC: 11 MMOL/L (ref 4–13)
AST SERPL W P-5'-P-CCNC: 12 U/L (ref 5–45)
BILIRUB SERPL-MCNC: 0.3 MG/DL (ref 0.2–1)
BUN SERPL-MCNC: 4 MG/DL (ref 5–25)
CALCIUM SERPL-MCNC: 8.7 MG/DL (ref 8.3–10.1)
CHLORIDE SERPL-SCNC: 101 MMOL/L (ref 100–108)
CO2 SERPL-SCNC: 28 MMOL/L (ref 21–32)
CREAT SERPL-MCNC: 0.7 MG/DL (ref 0.6–1.3)
ERYTHROCYTE [DISTWIDTH] IN BLOOD BY AUTOMATED COUNT: 15.5 % (ref 11.6–15.1)
GFR SERPL CREATININE-BSD FRML MDRD: 117 ML/MIN/1.73SQ M
GLUCOSE SERPL-MCNC: 78 MG/DL (ref 65–140)
HCT VFR BLD AUTO: 42.2 % (ref 36.5–49.3)
HGB BLD-MCNC: 12.8 G/DL (ref 12–17)
LIPASE SERPL-CCNC: 685 U/L (ref 73–393)
MAGNESIUM SERPL-MCNC: 1.8 MG/DL (ref 1.6–2.6)
MCH RBC QN AUTO: 26 PG (ref 26.8–34.3)
MCHC RBC AUTO-ENTMCNC: 30.3 G/DL (ref 31.4–37.4)
MCV RBC AUTO: 86 FL (ref 82–98)
PHOSPHATE SERPL-MCNC: 3.3 MG/DL (ref 2.7–4.5)
PLATELET # BLD AUTO: 143 THOUSANDS/UL (ref 149–390)
PMV BLD AUTO: 10.7 FL (ref 8.9–12.7)
POTASSIUM SERPL-SCNC: 4 MMOL/L (ref 3.5–5.3)
PROT SERPL-MCNC: 7 G/DL (ref 6.4–8.2)
RBC # BLD AUTO: 4.93 MILLION/UL (ref 3.88–5.62)
SODIUM SERPL-SCNC: 140 MMOL/L (ref 136–145)
WBC # BLD AUTO: 7.75 THOUSAND/UL (ref 4.31–10.16)

## 2019-11-12 PROCEDURE — 84100 ASSAY OF PHOSPHORUS: CPT | Performed by: NURSE PRACTITIONER

## 2019-11-12 PROCEDURE — 80053 COMPREHEN METABOLIC PANEL: CPT | Performed by: NURSE PRACTITIONER

## 2019-11-12 PROCEDURE — 83735 ASSAY OF MAGNESIUM: CPT | Performed by: NURSE PRACTITIONER

## 2019-11-12 PROCEDURE — 99232 SBSQ HOSP IP/OBS MODERATE 35: CPT | Performed by: INTERNAL MEDICINE

## 2019-11-12 PROCEDURE — 82787 IGG 1 2 3 OR 4 EACH: CPT | Performed by: PHYSICIAN ASSISTANT

## 2019-11-12 PROCEDURE — 85027 COMPLETE CBC AUTOMATED: CPT | Performed by: INTERNAL MEDICINE

## 2019-11-12 PROCEDURE — 82784 ASSAY IGA/IGD/IGG/IGM EACH: CPT | Performed by: PHYSICIAN ASSISTANT

## 2019-11-12 PROCEDURE — 83690 ASSAY OF LIPASE: CPT | Performed by: NURSE PRACTITIONER

## 2019-11-12 PROCEDURE — 99232 SBSQ HOSP IP/OBS MODERATE 35: CPT | Performed by: NURSE PRACTITIONER

## 2019-11-12 RX ORDER — HYDROMORPHONE HCL 110MG/55ML
2 PATIENT CONTROLLED ANALGESIA SYRINGE INTRAVENOUS
Status: DISCONTINUED | OUTPATIENT
Start: 2019-11-12 | End: 2019-11-14 | Stop reason: HOSPADM

## 2019-11-12 RX ORDER — PREGABALIN 100 MG/1
100 CAPSULE ORAL 2 TIMES DAILY
Status: DISCONTINUED | OUTPATIENT
Start: 2019-11-12 | End: 2019-11-14 | Stop reason: HOSPADM

## 2019-11-12 RX ORDER — ATENOLOL 50 MG/1
100 TABLET ORAL DAILY
Status: DISCONTINUED | OUTPATIENT
Start: 2019-11-13 | End: 2019-11-14 | Stop reason: HOSPADM

## 2019-11-12 RX ORDER — MAGNESIUM HYDROXIDE/ALUMINUM HYDROXICE/SIMETHICONE 120; 1200; 1200 MG/30ML; MG/30ML; MG/30ML
30 SUSPENSION ORAL EVERY 4 HOURS PRN
Status: DISCONTINUED | OUTPATIENT
Start: 2019-11-12 | End: 2019-11-13

## 2019-11-12 RX ADMIN — HYDROMORPHONE HYDROCHLORIDE 2.5 MG: 2 INJECTION, SOLUTION INTRAMUSCULAR; INTRAVENOUS; SUBCUTANEOUS at 10:48

## 2019-11-12 RX ADMIN — ONDANSETRON 4 MG: 2 INJECTION INTRAMUSCULAR; INTRAVENOUS at 03:12

## 2019-11-12 RX ADMIN — PROMETHAZINE HYDROCHLORIDE 12.5 MG: 25 INJECTION INTRAMUSCULAR; INTRAVENOUS at 17:56

## 2019-11-12 RX ADMIN — PROMETHAZINE HYDROCHLORIDE 12.5 MG: 25 INJECTION INTRAMUSCULAR; INTRAVENOUS at 05:43

## 2019-11-12 RX ADMIN — PREGABALIN 100 MG: 100 CAPSULE ORAL at 17:54

## 2019-11-12 RX ADMIN — SODIUM CHLORIDE, SODIUM LACTATE, POTASSIUM CHLORIDE, AND CALCIUM CHLORIDE 250 ML/HR: .6; .31; .03; .02 INJECTION, SOLUTION INTRAVENOUS at 01:26

## 2019-11-12 RX ADMIN — HYDROMORPHONE HYDROCHLORIDE 2 MG: 2 INJECTION INTRAMUSCULAR; INTRAVENOUS; SUBCUTANEOUS at 13:32

## 2019-11-12 RX ADMIN — HYDROMORPHONE HYDROCHLORIDE 2.5 MG: 2 INJECTION, SOLUTION INTRAMUSCULAR; INTRAVENOUS; SUBCUTANEOUS at 07:09

## 2019-11-12 RX ADMIN — HYDROMORPHONE HYDROCHLORIDE 2 MG: 2 INJECTION INTRAMUSCULAR; INTRAVENOUS; SUBCUTANEOUS at 21:39

## 2019-11-12 RX ADMIN — HYDROMORPHONE HYDROCHLORIDE 2 MG: 2 INJECTION INTRAMUSCULAR; INTRAVENOUS; SUBCUTANEOUS at 17:57

## 2019-11-12 RX ADMIN — PREGABALIN 100 MG: 100 CAPSULE ORAL at 09:36

## 2019-11-12 RX ADMIN — SODIUM CHLORIDE, SODIUM LACTATE, POTASSIUM CHLORIDE, AND CALCIUM CHLORIDE 250 ML/HR: .6; .31; .03; .02 INJECTION, SOLUTION INTRAVENOUS at 09:37

## 2019-11-12 RX ADMIN — PROMETHAZINE HYDROCHLORIDE 12.5 MG: 25 INJECTION INTRAMUSCULAR; INTRAVENOUS at 13:30

## 2019-11-12 RX ADMIN — SODIUM CHLORIDE, SODIUM LACTATE, POTASSIUM CHLORIDE, AND CALCIUM CHLORIDE 250 ML/HR: .6; .31; .03; .02 INJECTION, SOLUTION INTRAVENOUS at 05:39

## 2019-11-12 RX ADMIN — SODIUM CHLORIDE, SODIUM LACTATE, POTASSIUM CHLORIDE, AND CALCIUM CHLORIDE 250 ML/HR: .6; .31; .03; .02 INJECTION, SOLUTION INTRAVENOUS at 21:39

## 2019-11-12 RX ADMIN — METHADONE HYDROCHLORIDE 100 MG: 10 CONCENTRATE ORAL at 08:51

## 2019-11-12 RX ADMIN — ONDANSETRON 4 MG: 2 INJECTION INTRAMUSCULAR; INTRAVENOUS at 21:38

## 2019-11-12 RX ADMIN — SODIUM CHLORIDE, SODIUM LACTATE, POTASSIUM CHLORIDE, AND CALCIUM CHLORIDE 250 ML/HR: .6; .31; .03; .02 INJECTION, SOLUTION INTRAVENOUS at 17:34

## 2019-11-12 RX ADMIN — ONDANSETRON 4 MG: 2 INJECTION INTRAMUSCULAR; INTRAVENOUS at 09:37

## 2019-11-12 RX ADMIN — SODIUM CHLORIDE, SODIUM LACTATE, POTASSIUM CHLORIDE, AND CALCIUM CHLORIDE 250 ML/HR: .6; .31; .03; .02 INJECTION, SOLUTION INTRAVENOUS at 13:37

## 2019-11-12 RX ADMIN — HYDROMORPHONE HYDROCHLORIDE 2.5 MG: 2 INJECTION, SOLUTION INTRAMUSCULAR; INTRAVENOUS; SUBCUTANEOUS at 03:12

## 2019-11-12 NOTE — MALNUTRITION/BMI
This medical record reflects one or more clinical indicators suggestive of malnutrition and/or morbid obesity  Malnutrition Findings:   Malnutrition type: Chronic illness  Degree of Malnutrition: Other severe protein calorie malnutrition  Malnutrition Characteristics: Inadequate energy, Weight loss    Malnutrition related to prolonged inadequate energy intake as evidenced by <75% energy intake compared to estimated needs >1 month, 55#/17% weight loss since 1/29/19, 19#/7% weight loss since 10/8/19, lack of interest in food  Recommend ensure clear and gelatein supplements if able to tolerate PO diet  BMI Findings:  BMI Classifications: Morbid Obesity 40-44 9     Body mass index is 40 29 kg/m²  See Nutrition note dated 11/12/2019 for additional details  Completed nutrition assessment is viewable in the nutrition documentation

## 2019-11-12 NOTE — ASSESSMENT & PLAN NOTE
As noted on previous imaging  He has had recurrent cyst in the past that have required drainage  Now there appears to be new collection per CT imaging    · GI following  · Will need to follow-up with Dr Yesenia Barnes, advanced endoscopist at [place], to evaluate for further drainage   · See plan for pancreatitis

## 2019-11-12 NOTE — UTILIZATION REVIEW
Notification of Inpatient Admission/Inpatient Authorization Request   This is a Notification of Inpatient Admission for [address]  Be advised that this patient was admitted to our facility under Inpatient Status  Contact Abdi Salinas at [phone] for additional admission information  [facility] DEPT DEDICATED Joie Klein [phone]  Patient Name:   Heidy Abraham   YOB: 1980       [address]   [address]:   [address]   Tax ID: [id number]  NPI: [id number] Attending Provider/NPI: Neetu Ennis Md [[id number]]   Place of Service Code: 24     Place of Service Name:  [address] [place] [address]   Start Date: 11/12/19 1008     Discharge Date & Time: No discharge date for patient encounter  Type of Admission: Inpatient Status Discharge Disposition   (if discharged): Home/Self Care   Patient Diagnoses: Acute pancreatitis [K85 90]  Pancreatic cyst [K86 2]  Abdominal pain [R10 9]     Orders: Admission Orders (From admission, onward)     Ordered        11/12/19 1008  Inpatient Admission  Once         11/11/19 0723  Place in Observation  Once                    Assigned Utilization Review Contact: Abdi Salinas  Utilization   Network Utilization Review Department  Phone: [phone]; Fax [phone]  Email: [email or web address]   ATTENTION PAYERS: Please call the assigned Utilization  directly with any questions or concerns ALL voicemails in the department are confidential  Send all requests for admission clinical reviews, approved or denied determinations and any other requests to dedicated fax number belonging to the campus where the patient is receiving treatment

## 2019-11-12 NOTE — ASSESSMENT & PLAN NOTE
Patient has a well-known history of recurrent pancreatitis as well as pancreatic pseudocyst   This was thought to be secondary to smoking and obesity  He follows up with GI as an outpatient  He was hospitalized in late September due to symptoms of a pseudocyst causing compression on the stomach, he had an EUS after discharge, and had the cyst drained    · Continue aggressive IV fluid hydration, LR at 250 mL/hr  · NPO, pain control and antiemetics   · I S    · GI following, appreciate input  · Recheck triglycerides and autoimmune pancreatitis markers   · Will likely need Creon on discharge   · Discontinued PPI and Ranitidine   · Consider empiric cholecystectomy pending progress   · Will need follow-up with Dr Yesenia Barnes in [place] for consideration of further endoscopic drainage of pseudocyst

## 2019-11-12 NOTE — PLAN OF CARE
Problem: Nutrition/Hydration-ADULT  Goal: Nutrient/Hydration intake appropriate for improving, restoring or maintaining nutritional needs  Description  Monitor and assess patient's nutrition/hydration status for malnutrition  Collaborate with interdisciplinary team and initiate plan and interventions as ordered  Monitor patient's weight and dietary intake as ordered or per policy  Utilize nutrition screening tool and intervene as necessary  Determine patient's food preferences and provide high-protein, high-caloric foods as appropriate       INTERVENTIONS:  - Monitor oral intake, urinary output, labs, and treatment plans  - Assess nutrition and hydration status and recommend course of action  - Evaluate amount of meals eaten  - Assist patient with eating if necessary   - Allow adequate time for meals  - Recommend/ encourage appropriate diets, oral nutritional supplements, and vitamin/mineral supplements  - Order, calculate, and assess calorie counts as needed  - Recommend, monitor, and adjust tube feedings and TPN/PPN based on assessed needs  - Assess need for intravenous fluids  - Provide specific nutrition/hydration education as appropriate  - Include patient/family/caregiver in decisions related to nutrition  Outcome: Progressing     Problem: PAIN - ADULT  Goal: Verbalizes/displays adequate comfort level or baseline comfort level  Description  Interventions:  - Encourage patient to monitor pain and request assistance  - Assess pain using appropriate pain scale  - Administer analgesics based on type and severity of pain and evaluate response  - Implement non-pharmacological measures as appropriate and evaluate response  - Consider cultural and social influences on pain and pain management  - Notify physician/advanced practitioner if interventions unsuccessful or patient reports new pain  Outcome: Progressing     Problem: INFECTION - ADULT  Goal: Absence or prevention of progression during hospitalization  Description  INTERVENTIONS:  - Assess and monitor for signs and symptoms of infection  - Monitor lab/diagnostic results  - Monitor all insertion sites, i e  indwelling lines, tubes, and drains  - Monitor endotracheal if appropriate and nasal secretions for changes in amount and color  - [place] appropriate cooling/warming therapies per order  - Administer medications as ordered  - Instruct and encourage patient and family to use good hand hygiene technique  - Identify and instruct in appropriate isolation precautions for identified infection/condition  Outcome: Progressing  Goal: Absence of fever/infection during neutropenic period  Description  INTERVENTIONS:  - Monitor WBC    Outcome: Progressing     Problem: SAFETY ADULT  Goal: Patient will remain free of falls  Description  INTERVENTIONS:  - Assess patient frequently for physical needs  -  Identify cognitive and physical deficits and behaviors that affect risk of falls    -  [place] fall precautions as indicated by assessment   - Educate patient/family on patient safety including physical limitations  - Instruct patient to call for assistance with activity based on assessment  - Modify environment to reduce risk of injury  - Consider OT/PT consult to assist with strengthening/mobility  Outcome: Progressing  Goal: Maintain or return to baseline ADL function  Description  INTERVENTIONS:  -  Assess patient's ability to carry out ADLs; assess patient's baseline for ADL function and identify physical deficits which impact ability to perform ADLs (bathing, care of mouth/teeth, toileting, grooming, dressing, etc )  - Assess/evaluate cause of self-care deficits   - Assess range of motion  - Assess patient's mobility; develop plan if impaired  - Assess patient's need for assistive devices and provide as appropriate  - Encourage maximum independence but intervene and supervise when necessary  - Involve family in performance of ADLs  - Assess for home care needs following discharge   - Consider OT consult to assist with ADL evaluation and planning for discharge  - Provide patient education as appropriate  Outcome: Progressing  Goal: Maintain or return mobility status to optimal level  Description  INTERVENTIONS:  - Assess patient's baseline mobility status (ambulation, transfers, stairs, etc )    - Identify cognitive and physical deficits and behaviors that affect mobility  - Identify mobility aids required to assist with transfers and/or ambulation (gait belt, sit-to-stand, lift, walker, cane, etc )  - [place] fall precautions as indicated by assessment  - Record patient progress and toleration of activity level on Mobility SBAR; progress patient to next Phase/Stage  - Instruct patient to call for assistance with activity based on assessment  - Consider rehabilitation consult to assist with strengthening/weightbearing, etc   Outcome: Progressing     Problem: DISCHARGE PLANNING  Goal: Discharge to home or other facility with appropriate resources  Description  INTERVENTIONS:  - Identify barriers to discharge w/patient and caregiver  - Arrange for needed discharge resources and transportation as appropriate  - Identify discharge learning needs (meds, wound care, etc )  - Arrange for interpretive services to assist at discharge as needed  - Refer to Case Management Department for coordinating discharge planning if the patient needs post-hospital services based on physician/advanced practitioner order or complex needs related to functional status, cognitive ability, or social support system  Outcome: Progressing     Problem: Knowledge Deficit  Goal: Patient/family/caregiver demonstrates understanding of disease process, treatment plan, medications, and discharge instructions  Description  Complete learning assessment and assess knowledge base    Interventions:  - Provide teaching at level of understanding  - Provide teaching via preferred learning methods  Outcome: Progressing     Problem: GASTROINTESTINAL - ADULT  Goal: Minimal or absence of nausea and/or vomiting  Description  INTERVENTIONS:  - Administer IV fluids if ordered to ensure adequate hydration  - Maintain NPO status until nausea and vomiting are resolved  - Nasogastric tube if ordered  - Administer ordered antiemetic medications as needed  - Provide nonpharmacologic comfort measures as appropriate  - Advance diet as tolerated, if ordered  - Consider nutrition services referral to assist patient with adequate nutrition and appropriate food choices  Outcome: Progressing  Goal: Maintains or returns to baseline bowel function  Description  INTERVENTIONS:  - Assess bowel function  - Encourage oral fluids to ensure adequate hydration  - Administer IV fluids if ordered to ensure adequate hydration  - Administer ordered medications as needed  - Encourage mobilization and activity  - Consider nutritional services referral to assist patient with adequate nutrition and appropriate food choices  Outcome: Progressing  Goal: Maintains adequate nutritional intake  Description  INTERVENTIONS:  - Monitor percentage of each meal consumed  - Identify factors contributing to decreased intake, treat as appropriate  - Assist with meals as needed  - Monitor I&O, weight, and lab values if indicated  - Obtain nutrition services referral as needed  Outcome: Progressing I will SWITCH the dose or number of times a day I take the medications listed below when I get home from the hospital:  None

## 2019-11-12 NOTE — PROGRESS NOTES
GI Progress Note - Barbara Huffman 37 y o  male MRN: [id number]    Unit/Bed#: -01 Encounter: [id number]    Subjective: He reports mild improvement in symptoms today, nausea is staying controlled with alternating zofran and phenergan  Tolerated pills with water  Wants to try hot tea  Objective:     Vitals: Blood pressure 146/84, pulse 66, temperature 98 5 °F (36 9 °C), temperature source Oral, resp  rate 20, height 5' 8" (1 727 m), weight 120 kg (265 lb), SpO2 95 %  ,Body mass index is 40 29 kg/m²  Intake/Output Summary (Last 24 hours) at 11/12/2019 1422  Last data filed at 11/12/2019 3391  Gross per 24 hour   Intake 5812 5 ml   Output    Net 5812 5 ml       Physical Exam:     General Appearance: Alert, appears stated age and cooperative  Lungs: Clear to auscultation bilaterally, no rales or rhonchi, no labored breathing/accessory muscle use  Heart: Regular rate and rhythm, S1, S2 normal, no murmur, click, rub or gallop  Abdomen: Soft, non-tender, non-distended; bowel sounds normal; no masses or no organomegaly  Extremities: No cyanosis, edema    Invasive Devices     Peripheral Intravenous Line            Peripheral IV 11/11/19 Right Antecubital 1 day                Lab Results:  Results from last 7 days   Lab Units 11/12/19  0533  11/11/19  0532   WBC Thousand/uL 7 75  --  10 63*   HEMOGLOBIN g/dL 12 8  --  13 6   HEMATOCRIT % 42 2  --  43 6   PLATELETS Thousands/uL 143*   < > 303   NEUTROS PCT %  --   --  80*   LYMPHS PCT %  --   --  14   MONOS PCT %  --   --  4   EOS PCT %  --   --  1    < > = values in this interval not displayed       Results from last 7 days   Lab Units 11/12/19  0533   POTASSIUM mmol/L 4 0   CHLORIDE mmol/L 101   CO2 mmol/L 28   BUN mg/dL 4*   CREATININE mg/dL 0 70   CALCIUM mg/dL 8 7   ALK PHOS U/L 104   ALT U/L 10*   AST U/L 12         Results from last 7 days   Lab Units 11/12/19  0533   LIPASE u/L 685*       Imaging Studies: I have personally reviewed pertinent imaging studies  Ct Abdomen Pelvis Wo Contrast  Result Date: 11/11/2019  Impression: Findings suggestive of mild acute or subacute pancreatitis  New 4 5 x 2 5 x 3 cm fluid collection anterior to the proximal jejunum immediately distal to the ligament of Treitz attributed to complex pancreatic pseudocyst  New additional potentially communicating smaller collections in the left upper abdomen, the larger of which measures 3 4 x 2 3 x 2 2 cm anterior to the proximal descending colon  The smaller collection immediately superolateral to the dominant collection adjacent to the proximal jejunum measures 1 4 x 1 x 1 4 cm also attributed to complex pancreatic pseudocysts  Interval decreased size of proximal gastric intramural fluid collection currently approximately 1 4 x 0 8 x 0 8 cm and previously 2 8 x 2 x 2 4 cm         Assessment and Plan:        Multiple Pancreatic Pseudocysts  Recurrent Idiopathic Pancreatitis  Unintentional Weight Loss  - CT A/P on admission with multiple pseudocysts present, the largest measuring 4 5 x 2 5 x 3 cm fluid collection anterior to proximal jejunum, there is decrease in size of the gastric intramural fluid collection  - He must be having recurrent pancreatitis episodes to develop new pseudocysts, unclear etiology of pancreatitis  - No alcohol abuse, RUQ US previously negative for cholelithiasis/sludge, TG normal   - IgG4 pending for autoimmune pancreatitis  - Will need to consider repeat EUS as outpatient to rule out microlithiasis/sludge as well as possible cyst gastrostomy given recurrent pseudocyst formation which was previously causing extrinsic compression in the antrum and his symptoms of nausea/vomiting/pain with poor PO intake, waiting to hear from advanced endoscopist after review of CT imaging  - Trial clear liquid diet, add ensure clear TID AC  - LR at 250 ml/hr until tolerating liquids  - Pain control and antiemetics  - Lisinopril stopped in the past for possible medication induced pancreatitis, will stop omeprazole and ranitidine as these could be linked with pancreatitis as well  - Serial abdominal exams  - Incentive spirometry and DVT prophylaxis while hospitalized  - Will add mylanta PRN or can consider carafate 1 g BID for prior history of gastritis to see if this helps his pain      The patient will be seen by Dr Geraldo Dunham

## 2019-11-12 NOTE — ASSESSMENT & PLAN NOTE
Lab Results   Component Value Date    HGBA1C 6 0 09/05/2019       No results for input(s): POCGLU in the last 72 hours  Blood Sugar Average: Last 72 hrs:   A1c 6 9  Currently diet controlled  Patient not on medication    Will hold off on insulin and glucose checks for now

## 2019-11-12 NOTE — UTILIZATION REVIEW
Initial Clinical Review    Admission: Date/Time/Statement:  OBS  11/11  0723 converted to IP on 11/12 1008 for continued treatment of pancreatitis     Admitting Physician YAZAN CLEANING    Level of Care Med Surg    Estimated length of stay More than 2 Midnights    Certification I certify that inpatient services are medically necessary for this patient for a duration of greater than two midnights  See H&P and MD Progress Notes for additional information about the patient's course of treatment  ED Arrival Information     Expected Arrival Acuity Means of Arrival Escorted By Service Admission Type    - 11/11/2019 04:46 Urgent Walk-In Self General Medicine Urgent    Arrival Complaint    vomiting        Chief Complaint   Patient presents with    Abdominal Pain     Mid abd pain and vomitting since yesterday, denies fevers or diarrhea, reports hx of pancreatitis     Assessment/Plan:  37 y male to ED from home w/ abd pain , found to be acute pancreatitis   Admitted OBS status with plan to cont IVF , NPO , GI consult , pain regimen , reglan prn       11/11 GI consult   Multiple pancreatic pseudocysts , recurrent idiopathic pancreatitis and unintentional weight loss  Cont IVF , pain  Control and antiemetics   Consider initiation of creon ,Consider empiric cholecystectomy given recurrent idiopathic pancreatitis though this may be difficult given multiple pseudocysts present     ED Triage Vitals [11/11/19 0456]   Temperature Pulse Respirations Blood Pressure SpO2   98 1 °F (36 7 °C) 70 18 147/71 98 %      Temp Source Heart Rate Source Patient Position - Orthostatic VS BP Location FiO2 (%)   Oral Monitor Lying Right arm --      Pain Score       8        Wt Readings from Last 1 Encounters:   11/11/19 121 kg (265 lb 14 oz)     Additional Vital Signs:   11/12/19 0739  98 5 °F (36 9 °C)  66  20  146/84    95 %  None (Room air)  Sitting   11/11/19 2314  98 4 °F (36 9 °C)  67  17  124/69    93 %  None (Room air)  Lying 11/11/19 1808  98 2 °F (36 8 °C)  63  17  138/77  103  96 %  None (Room air)  Sitting   11/11/19 1750    63  15  121/66    94 %  None (Room air)  Lying   11/11/19 1418    62  14  138/73    97 %  None (Room air)  Sitting   11/11/19 1224    69    152/88    98 %  None (Room air)  Sitting   11/11/19 0923    67  15  133/73    96 %  None (Room air)  Lying   11/11/19 0656    69  17  149/83    94 %  None (Room          Pertinent Labs/Diagnostic Test Results:   11/11 CT abd -  Findings suggestive of mild acute or subacute pancreatitis  New 4 5 x 2 5 x 3 cm fluid collection anterior to the proximal jejunum immediately distal to the ligament of Treitz attributed to complex pancreatic pseudocyst  New additional potentially communicating smaller collections in the left upper abdomen, the larger of which measures 3 4 x 2 3 x 2 2 cm anterior to the proximal descending colon  The smaller collection immediately superolateral to the dominant collection adjacent to the proximal jejunum measures 1 4 x 1 x 1 4 cm also attributed to complex pancreatic pseudocysts  Interval decreased size of proximal gastric intramural fluid collection currently approximately 1 4 x 0 8 x 0 8 cm and previously 2 8 x 2 x 2 4 cm     Results from last 7 days   Lab Units 11/12/19 0533 11/11/19 0927 11/11/19  0532   WBC Thousand/uL 7 75  --  10 63*   HEMOGLOBIN g/dL 12 8  --  13 6   HEMATOCRIT % 42 2  --  43 6   PLATELETS Thousands/uL 143* 287 303   NEUTROS ABS Thousands/µL  --   --  8 61*     Results from last 7 days   Lab Units 11/12/19  0533 11/11/19  0532   SODIUM mmol/L 140 139   POTASSIUM mmol/L 4 0 3 9   CHLORIDE mmol/L 101 100   CO2 mmol/L 28 25   ANION GAP mmol/L 11 14*   BUN mg/dL 4* 6   CREATININE mg/dL 0 70 0 67   EGFR ml/min/1 73sq m 117 119   CALCIUM mg/dL 8 7 9 5   MAGNESIUM mg/dL 1 8  --    PHOSPHORUS mg/dL 3 3  --      Results from last 7 days   Lab Units 11/12/19  0533 11/11/19  0532   AST U/L 12 16   ALT U/L 10* 13   ALK PHOS U/L 104 131*   TOTAL PROTEIN g/dL 7 0 7 7   ALBUMIN g/dL 2 9* 3 1*   TOTAL BILIRUBIN mg/dL 0 30 0 40     Results from last 7 days   Lab Units 11/12/19  0533 11/11/19  0532   GLUCOSE RANDOM mg/dL 78 119     Results from last 7 days   Lab Units 11/11/19  0532   LACTIC ACID mmol/L 1 3       Results from last 7 days   Lab Units 11/12/19  0533 11/11/19  0532   LIPASE u/L 685* 507*     Results from last 7 days   Lab Units 11/11/19  0801   CLARITY UA  Clear   COLOR UA  Yellow   SPEC GRAV UA  1 015   PH UA  8 5*   GLUCOSE UA mg/dl Negative   KETONES UA mg/dl >=80 (3+)*   BLOOD UA  Negative   PROTEIN UA mg/dl Negative   NITRITE UA  Negative   BILIRUBIN UA  Small*   UROBILINOGEN UA E U /dl 1 0   LEUKOCYTES UA  Negative       ED Treatment:   Medication Administration from 11/11/2019 0446 to 11/11/2019 1808       Date/Time Order Dose Route Action     11/11/2019 0532 ondansetron (ZOFRAN) injection 4 mg 4 mg Intravenous Given     11/11/2019 0532 HYDROmorphone (DILAUDID) injection 1 mg 1 mg Intravenous Given     11/11/2019 0532 sodium chloride 0 9 % bolus 1,000 mL 1,000 mL Intravenous New Bag     11/11/2019 0654 HYDROmorphone (DILAUDID) injection 1 mg 1 mg Intravenous Given     11/11/2019 0652 ondansetron (ZOFRAN) injection 4 mg 4 mg Intravenous Given     11/11/2019 0655 sodium chloride 0 9 % bolus 1,000 mL 1,000 mL Intravenous New Bag     11/11/2019 1229 methadone (DOLOPHINE) 10 mg/mL oral concentrated solution 100 mg 100 mg Oral Given     11/11/2019 0927 metoclopramide (REGLAN) injection 10 mg 10 mg Intravenous Given     11/11/2019 1022 lactated ringers infusion 250 mL/hr Intravenous New Bag     11/11/2019 1422 HYDROmorphone (DILAUDID) injection 2 mg 2 mg Intravenous Given     11/11/2019 1107 promethazine (PHENERGAN) injection 12 5 mg 12 5 mg Intravenous Given     11/11/2019 1653 promethazine (PHENERGAN) injection 12 5 mg 12 5 mg Intravenous Given     11/11/2019 1654 HYDROmorphone (DILAUDID) injection 1 mg 1 mg Intravenous Given Past Medical History:   Diagnosis Date    Back pain     Cardiac disease     Chronic pain disorder     GERD (gastroesophageal reflux disease)     Hypertension     Myocardial infarction ([address] )     Neck pain      Present on Admission:   Acute on chronic pancreatitis (HCC)   Pancreatic cyst   Opiate dependence, continuous (HCC)   Tobacco use   Essential hypertension   Prolonged Q-T interval on ECG   Diabetes mellitus without complication (HCC)      Admitting Diagnosis: Acute pancreatitis [K85 90]  Pancreatic cyst [K86 2]  Abdominal pain [R10 9]  Age/Sex: 37 y o  male  Admission Orders:  Scheduled Medications:    Medications:  heparin (porcine) 5,000 Units Subcutaneous Q8H [facility]   methadone 100 mg Oral Daily   pregabalin 100 mg Oral BID     Continuous IV Infusions:    lactated ringers 250 mL/hr Intravenous Continuous     PRN Meds:    HYDROmorphone 2 5 mg Intravenous Q3H PRN x4   ondansetron 4 mg Intravenous Q6H PRN x3   promethazine 12 5 mg Intravenous Q4H PRN x3     Up w/ assist   NPO     IP CONSULT TO GASTROENTEROLOGY    Network Utilization Review Department  [email or web address]  ATTENTION: Please call with any questions or concerns to [phone] and carefully listen to the prompts so that you are directed to the right person  All voicemails are confidential   Ziyad Almeida all requests for admission clinical reviews, approved or denied determinations and any other requests to dedicated fax number below belonging to the campus where the patient is receiving treatment    FACILITY NAME UR FAX NUMBER   ADMISSION DENIALS (Administrative/Medical Necessity) [phone]   PARENT CHILD HEALTH (Maternity/NICU/Pediatrics) [phone]   [facility] [address] [facility] [phone]   [address] [place] [address] [address] [facility] [phone]     [facility] [address] [facility] [phone]   [address] [facility] [phone]

## 2019-11-12 NOTE — PROGRESS NOTES
Progress Note - Matrha Herzog 1980, 37 y o  male MRN: [id number]    Unit/Bed#: -01 Encounter: [id number]    Primary Care Provider: Diane Edwards DO   Date and time admitted to hospital: 11/11/2019  4:50 AM        * Acute on chronic pancreatitis [facility])  Assessment & Plan  Patient has a well-known history of recurrent pancreatitis as well as pancreatic pseudocyst   This was thought to be secondary to smoking and obesity  He follows up with GI as an outpatient  He was hospitalized in late September due to symptoms of a pseudocyst causing compression on the stomach, he had an EUS after discharge, and had the cyst drained  · Continue aggressive IV fluid hydration, LR at 250 mL/hr  · NPO, pain control and antiemetics   · I S    · GI following, appreciate input  · Recheck triglycerides and autoimmune pancreatitis markers   · Will likely need Creon on discharge   · Discontinued PPI and Ranitidine   · Consider empiric cholecystectomy pending progress   · Will need follow-up with Dr Willie Wilkes in [place] for consideration of further endoscopic drainage of pseudocyst     Pancreatic cyst  Assessment & Plan  As noted on previous imaging  He has had recurrent cyst in the past that have required drainage  Now there appears to be new collection per CT imaging  · GI following  · Will need to follow-up with Dr Willie Wilkes, advanced endoscopist at [place], to evaluate for further drainage   · See plan for pancreatitis    Essential hypertension  Assessment & Plan  Continue atenolol     Opiate dependence, continuous ([facility] [address] )  Assessment & Plan  Continue daily methadone  Received Methadone from [facility]    Diabetes mellitus without complication [facility])  Assessment & Plan  Lab Results   Component Value Date    HGBA1C 6 0 09/05/2019       No results for input(s): POCGLU in the last 72 hours  Blood Sugar Average: Last 72 hrs:   A1c 6 9  Currently diet controlled  Patient not on medication    Will hold off on insulin and glucose checks for now    Prolonged Q-T interval on ECG  Assessment & Plan  Demonstrated on previous EKG, but appears resolved on latest EKG  Exercise caution with QT prolonging medications    Tobacco use  Assessment & Plan   on cessation  Patient refused nicotine patch    Morbid obesity with BMI of 40 0-44 9, adult ([facility] [address] )  Assessment & Plan   on weight loss  Consider nutrition consult    VTE Pharmacologic Prophylaxis:   Pharmacologic: Heparin  Mechanical VTE Prophylaxis in Place: Yes    Patient Centered Rounds: I have performed bedside rounds with nursing staff today  Discussions with Specialists or Other Care Team Provider: Nursing, CM, GI    Education and Discussions with Family / Patient: I have answered all questions to the best of my ability  Time Spent for Care: 20 minutes  More than 50% of total time spent on counseling and coordination of care as described above  Current Length of Stay: 0 day(s)    Current Patient Status: Inpatient   Certification Statement: The patient will continue to require additional inpatient hospital stay due to acute pancreatitis with worsening lipase and ongoing pain    Discharge Plan: Patient is not medically stable for discharge today, likely in 24-48 hours  Code Status: Level 1 - Full Code      Subjective:   Reports persistent nausea  No vomiting since last night  Pain is intermittent, sharp, 8/10  Dilaudid relieves pain  He is following strict NPO  Tolerating fluids  Making good urine  Objective:     Vitals:   Temp (24hrs), Av 4 °F (36 9 °C), Min:98 2 °F (36 8 °C), Max:98 5 °F (36 9 °C)    Temp:  [98 2 °F (36 8 °C)-98 5 °F (36 9 °C)] 98 5 °F (36 9 °C)  HR:  [62-69] 66  Resp:  [14-20] 20  BP: (121-152)/(66-88) 146/84  SpO2:  [93 %-98 %] 95 %  Body mass index is 40 43 kg/m²  Input and Output Summary (last 24 hours):        Intake/Output Summary (Last 24 hours) at 2019 1213  Last data filed at 2019 0937  Gross per 24 hour   Intake 5812 5 ml   Output    Net 5812 5 ml       Physical Exam:     Physical Exam   Constitutional: He is oriented to person, place, and time  He appears well-developed and well-nourished  No distress  Pleasant, obese gentleman resting comfortably in bed on room air    HENT:   Head: Normocephalic  Mouth/Throat: Oropharynx is clear and moist    Eyes: Pupils are equal, round, and reactive to light  Conjunctivae and EOM are normal  Right eye exhibits no discharge  Left eye exhibits no discharge  No scleral icterus  Neck: Normal range of motion  Neck supple  No JVD present  Cardiovascular: Normal rate, regular rhythm, normal heart sounds and intact distal pulses  Pulmonary/Chest: Effort normal  No accessory muscle usage  No tachypnea  No respiratory distress  He has decreased breath sounds in the right lower field and the left lower field  He has no wheezes  He has no rhonchi  He has no rales  Abdominal: Soft  Bowel sounds are normal  He exhibits no distension  There is tenderness  There is no rebound and no guarding  Musculoskeletal: Normal range of motion  He exhibits no edema  Neurological: He is alert and oriented to person, place, and time  He has normal reflexes  Skin: Skin is warm and dry  Capillary refill takes less than 2 seconds  No rash noted  He is not diaphoretic  No erythema  Psychiatric: He has a normal mood and affect  His behavior is normal  Judgment and thought content normal    Nursing note and vitals reviewed  Additional Data:     Labs:    Results from last 7 days   Lab Units 11/12/19  0533  11/11/19  0532   WBC Thousand/uL 7 75  --  10 63*   HEMOGLOBIN g/dL 12 8  --  13 6   HEMATOCRIT % 42 2  --  43 6   PLATELETS Thousands/uL 143*   < > 303   NEUTROS PCT %  --   --  80*   LYMPHS PCT %  --   --  14   MONOS PCT %  --   --  4   EOS PCT %  --   --  1    < > = values in this interval not displayed       Results from last 7 days   Lab Units 11/12/19  0533   POTASSIUM mmol/L 4  0   CHLORIDE mmol/L 101   CO2 mmol/L 28   BUN mg/dL 4*   CREATININE mg/dL 0 70   CALCIUM mg/dL 8 7   ALK PHOS U/L 104   ALT U/L 10*   AST U/L 12           * I Have Reviewed All Lab Data Listed Above  * Additional Pertinent Lab Tests Reviewed: All Labs Within Last 24 Hours Reviewed    Imaging:    Imaging Reports Reviewed Today Include: CT A/P  Imaging Personally Reviewed by Myself Includes:  None     Recent Cultures (last 7 days):           Last 24 Hours Medication List:     Current Facility-Administered Medications:  heparin (porcine) 5,000 Units Subcutaneous Q8H [facility] Desean Harrell DO    HYDROmorphone 2 5 mg Intravenous Q3H PRN Adelaida Gonzalez PA-C    lactated ringers 250 mL/hr Intravenous Continuous Desean Harrell DO Last Rate: 250 mL/hr (11/12/19 1040)   methadone 100 mg Oral Daily Xavier Machado DO    ondansetron 4 mg Intravenous Q6H PRN Adelaida Gonzalez PA-C    pregabalin 100 mg Oral BID GALO Galvez    promethazine 12 5 mg Intravenous Q4H PRN Chao Blas PA-C         Today, Patient Was Seen By: GALO Castañeda    ** Please Note: Dictation voice to text software may have been used in the creation of this document   **

## 2019-11-13 LAB
ALBUMIN SERPL BCP-MCNC: 2.7 G/DL (ref 3.5–5)
ALP SERPL-CCNC: 96 U/L (ref 46–116)
ALT SERPL W P-5'-P-CCNC: 10 U/L (ref 12–78)
AMYLASE SERPL-CCNC: 40 IU/L (ref 25–115)
ANION GAP SERPL CALCULATED.3IONS-SCNC: 9 MMOL/L (ref 4–13)
AST SERPL W P-5'-P-CCNC: 12 U/L (ref 5–45)
ATRIAL RATE: 58 BPM
BASOPHILS # BLD AUTO: 0.01 THOUSANDS/ΜL (ref 0–0.1)
BASOPHILS NFR BLD AUTO: 0 % (ref 0–1)
BILIRUB SERPL-MCNC: 0.2 MG/DL (ref 0.2–1)
BUN SERPL-MCNC: 4 MG/DL (ref 5–25)
CALCIUM SERPL-MCNC: 8.6 MG/DL (ref 8.3–10.1)
CHLORIDE SERPL-SCNC: 103 MMOL/L (ref 100–108)
CO2 SERPL-SCNC: 30 MMOL/L (ref 21–32)
CREAT SERPL-MCNC: 0.7 MG/DL (ref 0.6–1.3)
EOSINOPHIL # BLD AUTO: 0.17 THOUSAND/ΜL (ref 0–0.61)
EOSINOPHIL NFR BLD AUTO: 3 % (ref 0–6)
ERYTHROCYTE [DISTWIDTH] IN BLOOD BY AUTOMATED COUNT: 15.5 % (ref 11.6–15.1)
GFR SERPL CREATININE-BSD FRML MDRD: 117 ML/MIN/1.73SQ M
GLUCOSE SERPL-MCNC: 74 MG/DL (ref 65–140)
HCT VFR BLD AUTO: 39.5 % (ref 36.5–49.3)
HGB BLD-MCNC: 11.8 G/DL (ref 12–17)
IMM GRANULOCYTES # BLD AUTO: 0.02 THOUSAND/UL (ref 0–0.2)
IMM GRANULOCYTES NFR BLD AUTO: 0 % (ref 0–2)
LIPASE SERPL-CCNC: 549 U/L (ref 73–393)
LYMPHOCYTES # BLD AUTO: 1.96 THOUSANDS/ΜL (ref 0.6–4.47)
LYMPHOCYTES NFR BLD AUTO: 34 % (ref 14–44)
MAGNESIUM SERPL-MCNC: 1.7 MG/DL (ref 1.6–2.6)
MCH RBC QN AUTO: 25.8 PG (ref 26.8–34.3)
MCHC RBC AUTO-ENTMCNC: 29.9 G/DL (ref 31.4–37.4)
MCV RBC AUTO: 86 FL (ref 82–98)
MONOCYTES # BLD AUTO: 0.43 THOUSAND/ΜL (ref 0.17–1.22)
MONOCYTES NFR BLD AUTO: 7 % (ref 4–12)
NEUTROPHILS # BLD AUTO: 3.25 THOUSANDS/ΜL (ref 1.85–7.62)
NEUTS SEG NFR BLD AUTO: 56 % (ref 43–75)
NRBC BLD AUTO-RTO: 0 /100 WBCS
P AXIS: 18 DEGREES
PHOSPHATE SERPL-MCNC: 4 MG/DL (ref 2.7–4.5)
PLATELET # BLD AUTO: 110 THOUSANDS/UL (ref 149–390)
PMV BLD AUTO: 10.3 FL (ref 8.9–12.7)
POTASSIUM SERPL-SCNC: 3.7 MMOL/L (ref 3.5–5.3)
PR INTERVAL: 156 MS
PROT SERPL-MCNC: 6.5 G/DL (ref 6.4–8.2)
QRS AXIS: -3 DEGREES
QRSD INTERVAL: 92 MS
QT INTERVAL: 460 MS
QTC INTERVAL: 451 MS
RBC # BLD AUTO: 4.58 MILLION/UL (ref 3.88–5.62)
SODIUM SERPL-SCNC: 142 MMOL/L (ref 136–145)
T WAVE AXIS: 14 DEGREES
VENTRICULAR RATE: 58 BPM
WBC # BLD AUTO: 5.84 THOUSAND/UL (ref 4.31–10.16)

## 2019-11-13 PROCEDURE — 83690 ASSAY OF LIPASE: CPT | Performed by: NURSE PRACTITIONER

## 2019-11-13 PROCEDURE — 99232 SBSQ HOSP IP/OBS MODERATE 35: CPT | Performed by: PHYSICIAN ASSISTANT

## 2019-11-13 PROCEDURE — 93010 ELECTROCARDIOGRAM REPORT: CPT | Performed by: INTERNAL MEDICINE

## 2019-11-13 PROCEDURE — 82150 ASSAY OF AMYLASE: CPT | Performed by: NURSE PRACTITIONER

## 2019-11-13 PROCEDURE — 85025 COMPLETE CBC W/AUTO DIFF WBC: CPT | Performed by: NURSE PRACTITIONER

## 2019-11-13 PROCEDURE — 83735 ASSAY OF MAGNESIUM: CPT | Performed by: NURSE PRACTITIONER

## 2019-11-13 PROCEDURE — 99232 SBSQ HOSP IP/OBS MODERATE 35: CPT | Performed by: INTERNAL MEDICINE

## 2019-11-13 PROCEDURE — 84100 ASSAY OF PHOSPHORUS: CPT | Performed by: NURSE PRACTITIONER

## 2019-11-13 PROCEDURE — 93005 ELECTROCARDIOGRAM TRACING: CPT

## 2019-11-13 PROCEDURE — 80053 COMPREHEN METABOLIC PANEL: CPT | Performed by: NURSE PRACTITIONER

## 2019-11-13 RX ORDER — OXYCODONE HYDROCHLORIDE 10 MG/1
10 TABLET ORAL EVERY 4 HOURS PRN
Status: DISCONTINUED | OUTPATIENT
Start: 2019-11-13 | End: 2019-11-14 | Stop reason: HOSPADM

## 2019-11-13 RX ORDER — METOCLOPRAMIDE HYDROCHLORIDE 5 MG/ML
10 INJECTION INTRAMUSCULAR; INTRAVENOUS EVERY 6 HOURS PRN
Status: DISCONTINUED | OUTPATIENT
Start: 2019-11-13 | End: 2019-11-13

## 2019-11-13 RX ORDER — METOCLOPRAMIDE HYDROCHLORIDE 5 MG/ML
10 INJECTION INTRAMUSCULAR; INTRAVENOUS EVERY 6 HOURS SCHEDULED
Status: DISCONTINUED | OUTPATIENT
Start: 2019-11-13 | End: 2019-11-14 | Stop reason: HOSPADM

## 2019-11-13 RX ORDER — SUCRALFATE ORAL 1 G/10ML
1000 SUSPENSION ORAL
Status: DISCONTINUED | OUTPATIENT
Start: 2019-11-13 | End: 2019-11-14 | Stop reason: HOSPADM

## 2019-11-13 RX ADMIN — OXYCODONE HYDROCHLORIDE 10 MG: 10 TABLET ORAL at 11:01

## 2019-11-13 RX ADMIN — ONDANSETRON 4 MG: 2 INJECTION INTRAMUSCULAR; INTRAVENOUS at 19:27

## 2019-11-13 RX ADMIN — SODIUM CHLORIDE, SODIUM LACTATE, POTASSIUM CHLORIDE, AND CALCIUM CHLORIDE 250 ML/HR: .6; .31; .03; .02 INJECTION, SOLUTION INTRAVENOUS at 01:10

## 2019-11-13 RX ADMIN — PROMETHAZINE HYDROCHLORIDE 12.5 MG: 25 INJECTION INTRAMUSCULAR; INTRAVENOUS at 05:05

## 2019-11-13 RX ADMIN — METOCLOPRAMIDE 10 MG: 5 INJECTION, SOLUTION INTRAMUSCULAR; INTRAVENOUS at 23:17

## 2019-11-13 RX ADMIN — HYDROMORPHONE HYDROCHLORIDE 2 MG: 2 INJECTION INTRAMUSCULAR; INTRAVENOUS; SUBCUTANEOUS at 13:39

## 2019-11-13 RX ADMIN — SODIUM CHLORIDE, SODIUM LACTATE, POTASSIUM CHLORIDE, AND CALCIUM CHLORIDE 125 ML/HR: .6; .31; .03; .02 INJECTION, SOLUTION INTRAVENOUS at 10:59

## 2019-11-13 RX ADMIN — METOCLOPRAMIDE 10 MG: 5 INJECTION, SOLUTION INTRAMUSCULAR; INTRAVENOUS at 11:01

## 2019-11-13 RX ADMIN — OXYCODONE HYDROCHLORIDE 10 MG: 10 TABLET ORAL at 19:27

## 2019-11-13 RX ADMIN — ONDANSETRON 4 MG: 2 INJECTION INTRAMUSCULAR; INTRAVENOUS at 03:55

## 2019-11-13 RX ADMIN — HYDROMORPHONE HYDROCHLORIDE 2 MG: 2 INJECTION INTRAMUSCULAR; INTRAVENOUS; SUBCUTANEOUS at 03:55

## 2019-11-13 RX ADMIN — SUCRALFATE 1000 MG: 1 SUSPENSION ORAL at 15:50

## 2019-11-13 RX ADMIN — ATENOLOL 100 MG: 50 TABLET ORAL at 08:22

## 2019-11-13 RX ADMIN — SODIUM CHLORIDE, SODIUM LACTATE, POTASSIUM CHLORIDE, AND CALCIUM CHLORIDE 250 ML/HR: .6; .31; .03; .02 INJECTION, SOLUTION INTRAVENOUS at 05:47

## 2019-11-13 RX ADMIN — PREGABALIN 100 MG: 100 CAPSULE ORAL at 08:22

## 2019-11-13 RX ADMIN — PROMETHAZINE HYDROCHLORIDE 12.5 MG: 25 INJECTION INTRAMUSCULAR; INTRAVENOUS at 01:04

## 2019-11-13 RX ADMIN — METOCLOPRAMIDE 10 MG: 5 INJECTION, SOLUTION INTRAMUSCULAR; INTRAVENOUS at 17:07

## 2019-11-13 RX ADMIN — HYDROMORPHONE HYDROCHLORIDE 2 MG: 2 INJECTION INTRAMUSCULAR; INTRAVENOUS; SUBCUTANEOUS at 23:17

## 2019-11-13 RX ADMIN — SODIUM CHLORIDE, SODIUM LACTATE, POTASSIUM CHLORIDE, AND CALCIUM CHLORIDE 125 ML/HR: .6; .31; .03; .02 INJECTION, SOLUTION INTRAVENOUS at 16:25

## 2019-11-13 RX ADMIN — HYDROMORPHONE HYDROCHLORIDE 2 MG: 2 INJECTION INTRAMUSCULAR; INTRAVENOUS; SUBCUTANEOUS at 01:04

## 2019-11-13 RX ADMIN — SODIUM CHLORIDE, SODIUM LACTATE, POTASSIUM CHLORIDE, AND CALCIUM CHLORIDE 250 ML/HR: .6; .31; .03; .02 INJECTION, SOLUTION INTRAVENOUS at 08:17

## 2019-11-13 RX ADMIN — HYDROMORPHONE HYDROCHLORIDE 2 MG: 2 INJECTION INTRAMUSCULAR; INTRAVENOUS; SUBCUTANEOUS at 16:37

## 2019-11-13 RX ADMIN — PREGABALIN 100 MG: 100 CAPSULE ORAL at 17:07

## 2019-11-13 RX ADMIN — METHADONE HYDROCHLORIDE 100 MG: 10 CONCENTRATE ORAL at 08:22

## 2019-11-13 RX ADMIN — HYDROMORPHONE HYDROCHLORIDE 2 MG: 2 INJECTION INTRAMUSCULAR; INTRAVENOUS; SUBCUTANEOUS at 07:30

## 2019-11-13 NOTE — ASSESSMENT & PLAN NOTE
Lab Results   Component Value Date    HGBA1C 6 0 09/05/2019       No results for input(s): POCGLU in the last 72 hours  Blood Sugar Average: Last 72 hrs:  · A1c 6 9  · Currently diet controlled  Patient not on medication    · Will hold off on insulin and glucose checks for now

## 2019-11-13 NOTE — PLAN OF CARE
Problem: Nutrition/Hydration-ADULT  Goal: Nutrient/Hydration intake appropriate for improving, restoring or maintaining nutritional needs  Description  Monitor and assess patient's nutrition/hydration status for malnutrition  Collaborate with interdisciplinary team and initiate plan and interventions as ordered  Monitor patient's weight and dietary intake as ordered or per policy  Utilize nutrition screening tool and intervene as necessary  Determine patient's food preferences and provide high-protein, high-caloric foods as appropriate       INTERVENTIONS:  - Monitor oral intake, urinary output, labs, and treatment plans  - Assess nutrition and hydration status and recommend course of action  - Evaluate amount of meals eaten  - Assist patient with eating if necessary   - Allow adequate time for meals  - Recommend/ encourage appropriate diets, oral nutritional supplements, and vitamin/mineral supplements  - Order, calculate, and assess calorie counts as needed  - Recommend, monitor, and adjust tube feedings and TPN/PPN based on assessed needs  - Assess need for intravenous fluids  - Provide specific nutrition/hydration education as appropriate  - Include patient/family/caregiver in decisions related to nutrition  Outcome: Progressing     Problem: INFECTION - ADULT  Goal: Absence or prevention of progression during hospitalization  Description  INTERVENTIONS:  - Assess and monitor for signs and symptoms of infection  - Monitor lab/diagnostic results  - Monitor all insertion sites, i e  indwelling lines, tubes, and drains  - Monitor endotracheal if appropriate and nasal secretions for changes in amount and color  - [place] appropriate cooling/warming therapies per order  - Administer medications as ordered  - Instruct and encourage patient and family to use good hand hygiene technique  - Identify and instruct in appropriate isolation precautions for identified infection/condition  Outcome: Progressing  Goal: Absence of fever/infection during neutropenic period  Description  INTERVENTIONS:  - Monitor WBC    Outcome: Progressing     Problem: SAFETY ADULT  Goal: Patient will remain free of falls  Description  INTERVENTIONS:  - Assess patient frequently for physical needs  -  Identify cognitive and physical deficits and behaviors that affect risk of falls    -  [place] fall precautions as indicated by assessment   - Educate patient/family on patient safety including physical limitations  - Instruct patient to call for assistance with activity based on assessment  - Modify environment to reduce risk of injury  - Consider OT/PT consult to assist with strengthening/mobility  Outcome: Progressing  Goal: Maintain or return to baseline ADL function  Description  INTERVENTIONS:  -  Assess patient's ability to carry out ADLs; assess patient's baseline for ADL function and identify physical deficits which impact ability to perform ADLs (bathing, care of mouth/teeth, toileting, grooming, dressing, etc )  - Assess/evaluate cause of self-care deficits   - Assess range of motion  - Assess patient's mobility; develop plan if impaired  - Assess patient's need for assistive devices and provide as appropriate  - Encourage maximum independence but intervene and supervise when necessary  - Involve family in performance of ADLs  - Assess for home care needs following discharge   - Consider OT consult to assist with ADL evaluation and planning for discharge  - Provide patient education as appropriate  Outcome: Progressing  Goal: Maintain or return mobility status to optimal level  Description  INTERVENTIONS:  - Assess patient's baseline mobility status (ambulation, transfers, stairs, etc )    - Identify cognitive and physical deficits and behaviors that affect mobility  - Identify mobility aids required to assist with transfers and/or ambulation (gait belt, sit-to-stand, lift, walker, cane, etc )  - [place] fall precautions as indicated by assessment  - Record patient progress and toleration of activity level on Mobility SBAR; progress patient to next Phase/Stage  - Instruct patient to call for assistance with activity based on assessment  - Consider rehabilitation consult to assist with strengthening/weightbearing, etc   Outcome: Progressing     Problem: DISCHARGE PLANNING  Goal: Discharge to home or other facility with appropriate resources  Description  INTERVENTIONS:  - Identify barriers to discharge w/patient and caregiver  - Arrange for needed discharge resources and transportation as appropriate  - Identify discharge learning needs (meds, wound care, etc )  - Arrange for interpretive services to assist at discharge as needed  - Refer to Case Management Department for coordinating discharge planning if the patient needs post-hospital services based on physician/advanced practitioner order or complex needs related to functional status, cognitive ability, or social support system  Outcome: Progressing     Problem: Knowledge Deficit  Goal: Patient/family/caregiver demonstrates understanding of disease process, treatment plan, medications, and discharge instructions  Description  Complete learning assessment and assess knowledge base    Interventions:  - Provide teaching at level of understanding  - Provide teaching via preferred learning methods  Outcome: Progressing     Problem: GASTROINTESTINAL - ADULT  Goal: Minimal or absence of nausea and/or vomiting  Description  INTERVENTIONS:  - Administer IV fluids if ordered to ensure adequate hydration  - Maintain NPO status until nausea and vomiting are resolved  - Nasogastric tube if ordered  - Administer ordered antiemetic medications as needed  - Provide nonpharmacologic comfort measures as appropriate  - Advance diet as tolerated, if ordered  - Consider nutrition services referral to assist patient with adequate nutrition and appropriate food choices  Outcome: Progressing  Goal: Maintains or returns to baseline bowel function  Description  INTERVENTIONS:  - Assess bowel function  - Encourage oral fluids to ensure adequate hydration  - Administer IV fluids if ordered to ensure adequate hydration  - Administer ordered medications as needed  - Encourage mobilization and activity  - Consider nutritional services referral to assist patient with adequate nutrition and appropriate food choices  Outcome: Progressing  Goal: Maintains adequate nutritional intake  Description  INTERVENTIONS:  - Monitor percentage of each meal consumed  - Identify factors contributing to decreased intake, treat as appropriate  - Assist with meals as needed  - Monitor I&O, weight, and lab values if indicated  - Obtain nutrition services referral as needed  Outcome: Progressing     Problem: Potential for Falls  Goal: Patient will remain free of falls  Description  INTERVENTIONS:  - Assess patient frequently for physical needs  -  Identify cognitive and physical deficits and behaviors that affect risk of falls    -  [place] fall precautions as indicated by assessment   - Educate patient/family on patient safety including physical limitations  - Instruct patient to call for assistance with activity based on assessment  - Modify environment to reduce risk of injury  - Consider OT/PT consult to assist with strengthening/mobility  Outcome: Progressing

## 2019-11-13 NOTE — SOCIAL WORK
CM met with pt at bedside  Pt lives in [place] with his wife  [facility] [address] with railings  Pt denies DME and completes ADL's independently  Pt denies hx of hhc and rehab but has OP CM with [facility]  Pt uses [facility]  Pt denies hx of mh and sa  Pt's wife, Cade is hcp  Pt does not have ad and declined information  Pt is disabled and drives  CM reviewed discharge planning process including the following: identifying help at home, patient preference for discharge planning needs, pharmacy preference, and availability of treatment team to discuss questions or concerns patient and/or family may have regarding understanding medications and recognizing signs and symptoms once discharged  CM also encouraged patient to follow up with all recommended appointments after discharge  Patient advised of importance for patient and family to participate in managing patients medical well being  CM discussed follow up with [facility] OP CM  Pt is agreeable  Pt has no other needs at this time

## 2019-11-13 NOTE — ASSESSMENT & PLAN NOTE
Patient has a well-known history of recurrent pancreatitis as well as pancreatic pseudocyst   This was thought to be secondary to smoking and obesity  He follows up with GI as an outpatient  He was hospitalized in late September due to symptoms of a pseudocyst causing compression on the stomach, he had an EUS after discharge, and had the cyst drained  · Continue IV fluid hydration, LR at 125 mL/hr  · Advance diet to surgical full liquid per GI  · GI following, appreciate input - trying to coordinate EUS outpatient with [facility]  · Recheck triglycerides is normal; pending autoimmune pancreatitis markers   · Will likely need Creon on discharge?   · Discontinued PPI and Ranitidine   · Will need follow-up with Dr Yesenia Barnes in [place]

## 2019-11-13 NOTE — PLAN OF CARE
Problem: Nutrition/Hydration-ADULT  Goal: Nutrient/Hydration intake appropriate for improving, restoring or maintaining nutritional needs  Description  Monitor and assess patient's nutrition/hydration status for malnutrition  Collaborate with interdisciplinary team and initiate plan and interventions as ordered  Monitor patient's weight and dietary intake as ordered or per policy  Utilize nutrition screening tool and intervene as necessary  Determine patient's food preferences and provide high-protein, high-caloric foods as appropriate       INTERVENTIONS:  - Monitor oral intake, urinary output, labs, and treatment plans  - Assess nutrition and hydration status and recommend course of action  - Evaluate amount of meals eaten  - Assist patient with eating if necessary   - Allow adequate time for meals  - Recommend/ encourage appropriate diets, oral nutritional supplements, and vitamin/mineral supplements  - Order, calculate, and assess calorie counts as needed  - Recommend, monitor, and adjust tube feedings and TPN/PPN based on assessed needs  - Assess need for intravenous fluids  - Provide specific nutrition/hydration education as appropriate  - Include patient/family/caregiver in decisions related to nutrition  Outcome: Progressing     Problem: PAIN - ADULT  Goal: Verbalizes/displays adequate comfort level or baseline comfort level  Description  Interventions:  - Encourage patient to monitor pain and request assistance  - Assess pain using appropriate pain scale  - Administer analgesics based on type and severity of pain and evaluate response  - Implement non-pharmacological measures as appropriate and evaluate response  - Consider cultural and social influences on pain and pain management  - Notify physician/advanced practitioner if interventions unsuccessful or patient reports new pain  Outcome: Progressing     Problem: INFECTION - ADULT  Goal: Absence or prevention of progression during hospitalization  Description  INTERVENTIONS:  - Assess and monitor for signs and symptoms of infection  - Monitor lab/diagnostic results  - Monitor all insertion sites, i e  indwelling lines, tubes, and drains  - Monitor endotracheal if appropriate and nasal secretions for changes in amount and color  - [place] appropriate cooling/warming therapies per order  - Administer medications as ordered  - Instruct and encourage patient and family to use good hand hygiene technique  - Identify and instruct in appropriate isolation precautions for identified infection/condition  Outcome: Progressing  Goal: Absence of fever/infection during neutropenic period  Description  INTERVENTIONS:  - Monitor WBC    Outcome: Progressing     Problem: SAFETY ADULT  Goal: Patient will remain free of falls  Description  INTERVENTIONS:  - Assess patient frequently for physical needs  -  Identify cognitive and physical deficits and behaviors that affect risk of falls    -  [place] fall precautions as indicated by assessment   - Educate patient/family on patient safety including physical limitations  - Instruct patient to call for assistance with activity based on assessment  - Modify environment to reduce risk of injury  - Consider OT/PT consult to assist with strengthening/mobility  Outcome: Progressing  Goal: Maintain or return to baseline ADL function  Description  INTERVENTIONS:  -  Assess patient's ability to carry out ADLs; assess patient's baseline for ADL function and identify physical deficits which impact ability to perform ADLs (bathing, care of mouth/teeth, toileting, grooming, dressing, etc )  - Assess/evaluate cause of self-care deficits   - Assess range of motion  - Assess patient's mobility; develop plan if impaired  - Assess patient's need for assistive devices and provide as appropriate  - Encourage maximum independence but intervene and supervise when necessary  - Involve family in performance of ADLs  - Assess for home care needs following discharge   - Consider OT consult to assist with ADL evaluation and planning for discharge  - Provide patient education as appropriate  Outcome: Progressing  Goal: Maintain or return mobility status to optimal level  Description  INTERVENTIONS:  - Assess patient's baseline mobility status (ambulation, transfers, stairs, etc )    - Identify cognitive and physical deficits and behaviors that affect mobility  - Identify mobility aids required to assist with transfers and/or ambulation (gait belt, sit-to-stand, lift, walker, cane, etc )  - [place] fall precautions as indicated by assessment  - Record patient progress and toleration of activity level on Mobility SBAR; progress patient to next Phase/Stage  - Instruct patient to call for assistance with activity based on assessment  - Consider rehabilitation consult to assist with strengthening/weightbearing, etc   Outcome: Progressing     Problem: DISCHARGE PLANNING  Goal: Discharge to home or other facility with appropriate resources  Description  INTERVENTIONS:  - Identify barriers to discharge w/patient and caregiver  - Arrange for needed discharge resources and transportation as appropriate  - Identify discharge learning needs (meds, wound care, etc )  - Arrange for interpretive services to assist at discharge as needed  - Refer to Case Management Department for coordinating discharge planning if the patient needs post-hospital services based on physician/advanced practitioner order or complex needs related to functional status, cognitive ability, or social support system  Outcome: Progressing     Problem: Knowledge Deficit  Goal: Patient/family/caregiver demonstrates understanding of disease process, treatment plan, medications, and discharge instructions  Description  Complete learning assessment and assess knowledge base    Interventions:  - Provide teaching at level of understanding  - Provide teaching via preferred learning methods  Outcome: Progressing     Problem: GASTROINTESTINAL - ADULT  Goal: Minimal or absence of nausea and/or vomiting  Description  INTERVENTIONS:  - Administer IV fluids if ordered to ensure adequate hydration  - Maintain NPO status until nausea and vomiting are resolved  - Nasogastric tube if ordered  - Administer ordered antiemetic medications as needed  - Provide nonpharmacologic comfort measures as appropriate  - Advance diet as tolerated, if ordered  - Consider nutrition services referral to assist patient with adequate nutrition and appropriate food choices  Outcome: Progressing  Goal: Maintains or returns to baseline bowel function  Description  INTERVENTIONS:  - Assess bowel function  - Encourage oral fluids to ensure adequate hydration  - Administer IV fluids if ordered to ensure adequate hydration  - Administer ordered medications as needed  - Encourage mobilization and activity  - Consider nutritional services referral to assist patient with adequate nutrition and appropriate food choices  Outcome: Progressing  Goal: Maintains adequate nutritional intake  Description  INTERVENTIONS:  - Monitor percentage of each meal consumed  - Identify factors contributing to decreased intake, treat as appropriate  - Assist with meals as needed  - Monitor I&O, weight, and lab values if indicated  - Obtain nutrition services referral as needed  Outcome: Progressing     Problem: Potential for Falls  Goal: Patient will remain free of falls  Description  INTERVENTIONS:  - Assess patient frequently for physical needs  -  Identify cognitive and physical deficits and behaviors that affect risk of falls    -  [place] fall precautions as indicated by assessment   - Educate patient/family on patient safety including physical limitations  - Instruct patient to call for assistance with activity based on assessment  - Modify environment to reduce risk of injury  - Consider OT/PT consult to assist with strengthening/mobility  Outcome: Progressing

## 2019-11-13 NOTE — PROGRESS NOTES
Progress Note - Mike Garay 1980, 37 y o  male MRN: [id number]    Unit/Bed#: -01 Encounter: [id number]    Primary Care Provider: Johnny Greer DO   Date and time admitted to hospital: 11/11/2019  4:50 AM      * Acute on chronic pancreatitis [facility])  Assessment & Plan  Patient has a well-known history of recurrent pancreatitis as well as pancreatic pseudocyst   This was thought to be secondary to smoking and obesity  He follows up with GI as an outpatient  He was hospitalized in late September due to symptoms of a pseudocyst causing compression on the stomach, he had an EUS after discharge, and had the cyst drained  · Continue IV fluid hydration, LR at 125 mL/hr  · Advance diet to surgical full liquid per GI  · GI following, appreciate input - trying to coordinate EUS outpatient with [facility]  · Recheck triglycerides is normal; pending autoimmune pancreatitis markers   · Will likely need Creon on discharge? · Discontinued PPI and Ranitidine   · Will need follow-up with Dr Franky Perry in [facility]     Pancreatic cyst  [address] [place] [address]  As noted on previous imaging  He has had recurrent cyst in the past that have required drainage  Now there appears to be new collection per CT imaging  · GI following - EUS outpatient, trying to coordinate this with Dr Franky Perry, advanced endoscopist at [facility], to evaluate for further drainage   · See plan for pancreatitis    Opiate dependence, continuous ([facility] [address] )  Assessment & Plan  · Continue daily methadone  · Received Methadone from [facility]    Diabetes mellitus without complication [facility])  Assessment & Plan  Lab Results   Component Value Date    HGBA1C 6 0 09/05/2019       No results for input(s): POCGLU in the last 72 hours  Blood Sugar Average: Last 72 hrs:  · A1c 6 9  · Currently diet controlled  Patient not on medication    · Will hold off on insulin and glucose checks for now    Essential hypertension  Assessment & Plan  · Continue atenolol Morbid obesity with BMI of 40 0-44 9, adult ([facility] [address] )  Assessment & Plan  ·  on weight loss, consider nutrition consult    Prolonged Q-T interval on ECG  Assessment & Plan  · Demonstrated on previous EKG, but appears resolved on latest EKG  · Exercise caution with QT prolonging medications    Tobacco use  Assessment & Plan  ·  on cessation; Patient refused nicotine patch      VTE Pharmacologic Prophylaxis:   Pharmacologic: Heparin  Mechanical VTE Prophylaxis in Place: No ambulate     Patient Centered Rounds: I have performed bedside rounds with nursing staff today  Discussions with Specialists or Other Care Team Provider: GI CM    Education and Discussions with Family / Patient: patient     Time Spent for Care: 20 minutes  More than 50% of total time spent on counseling and coordination of care as described above  Current Length of Stay: 1 day(s)    Current Patient Status: Inpatient   Certification Statement: The patient will continue to require additional inpatient hospital stay due to await GI clearance    Discharge Plan: anticipate next 24 hrs - need to set up EUS and advance diet     Code Status: Level 1 - Full Code      Subjective:   Patient seen, has chronic pain but does not have any worsening of his pain with eating and advancing his diet  Denies any fevers or chills  No nausea or vomiting  Passing gas normally  Objective:     Vitals:   Temp (24hrs), Av 2 °F (36 8 °C), Min:97 9 °F (36 6 °C), Max:98 5 °F (36 9 °C)    Temp:  [97 9 °F (36 6 °C)-98 5 °F (36 9 °C)] 98 2 °F (36 8 °C)  HR:  [59-63] 63  Resp:  [20] 20  BP: (140-179)/(80-96) 147/80  SpO2:  [92 %-94 %] 92 %  Body mass index is 40 29 kg/m²  Input and Output Summary (last 24 hours):        Intake/Output Summary (Last 24 hours) at 2019 1628  Last data filed at 2019 1352  Gross per 24 hour   Intake 6497 92 ml   Output    Net 6497 92 ml       Physical Exam:     Physical Exam   Constitutional: He is oriented to person, place, and time  Vital signs are normal  He appears well-developed and well-nourished  No distress  Cardiovascular: Normal rate, regular rhythm, S1 normal, S2 normal and normal heart sounds  Pulmonary/Chest: Effort normal and breath sounds normal  No respiratory distress  He has no wheezes  He has no rhonchi  He has no rales  Abdominal: Soft  Bowel sounds are normal  He exhibits no distension  There is no tenderness  Obese   Musculoskeletal: He exhibits no edema  Neurological: He is alert and oriented to person, place, and time  Psychiatric: He has a normal mood and affect  Nursing note and vitals reviewed  Additional Data:     Labs:    Results from last 7 days   Lab Units 11/13/19  0453   WBC Thousand/uL 5 84   HEMOGLOBIN g/dL 11 8*   HEMATOCRIT % 39 5   PLATELETS Thousands/uL 110*   NEUTROS PCT % 56   LYMPHS PCT % 34   MONOS PCT % 7   EOS PCT % 3     Results from last 7 days   Lab Units 11/13/19  0453   SODIUM mmol/L 142   POTASSIUM mmol/L 3 7   CHLORIDE mmol/L 103   CO2 mmol/L 30   BUN mg/dL 4*   CREATININE mg/dL 0 70   ANION GAP mmol/L 9   CALCIUM mg/dL 8 6   ALBUMIN g/dL 2 7*   TOTAL BILIRUBIN mg/dL 0 20   ALK PHOS U/L 96   ALT U/L 10*   AST U/L 12   GLUCOSE RANDOM mg/dL 74                 Results from last 7 days   Lab Units 11/11/19  0532   LACTIC ACID mmol/L 1 3           * I Have Reviewed All Lab Data Listed Above  * Additional Pertinent Lab Tests Reviewed:  All Labs Within Last 24 Hours Reviewed    Imaging:    Imaging Reports Reviewed Today Include:   Imaging Personally Reviewed by Myself Includes:      Recent Cultures (last 7 days):           Last 24 Hours Medication List:     Current Facility-Administered Medications:  atenolol 100 mg Oral Daily GALO Ricks    heparin (porcine) 5,000 Units Subcutaneous Q8H [address] Yolanda Craze, DO    HYDROmorphone 2 mg Intravenous Q3H PRN GALO Ricks    lactated ringers 125 mL/hr Intravenous Continuous Adelaida Gonzalez, ZULLY Last Rate: 125 mL/hr (11/13/19 8125)   methadone 100 mg Oral Daily Thompson Machado DO    metoclopramide 10 mg Intravenous Q6H [facility] Adelaida Gonzalez PA-C    ondansetron 4 mg Intravenous Q6H PRN Adelaida Gonzalez PA-C    oxyCODONE 10 mg Oral Q4H PRN Adelaida Gonzalez PA-C    pregabalin 100 mg Oral BID GALO Galvez    sucralfate 1,000 mg Oral BID CHARLETTE Pryor PA-C         Today, Patient Was Seen By: Peyton Vera PA-C    ** Please Note: Dictation voice to text software may have been used in the creation of this document   **

## 2019-11-13 NOTE — ASSESSMENT & PLAN NOTE
As noted on previous imaging  He has had recurrent cyst in the past that have required drainage  Now there appears to be new collection per CT imaging    · GI following - EUS outpatient, trying to coordinate this with Dr Wilda Rizvi, advanced endoscopist at [place], to evaluate for further drainage   · See plan for pancreatitis

## 2019-11-13 NOTE — PROGRESS NOTES
GI Progress Note - Karthik Villeda 37 y o  male MRN: [id number]    Unit/Bed#: -01 Encounter: [id number]    Subjective: He continues to have uncontrolled pain with nausea, 1 episode of vomiting yesterday  Last BM on Monday  Objective:     Vitals: Blood pressure (!) 179/96, pulse 59, temperature 98 5 °F (36 9 °C), temperature source Oral, resp  rate 20, height 5' 8" (1 727 m), weight 120 kg (265 lb), SpO2 94 %  ,Body mass index is 40 29 kg/m²  Intake/Output Summary (Last 24 hours) at 11/13/2019 1134  Last data filed at 11/13/2019 1059  Gross per 24 hour   Intake 6197 92 ml   Output    Net 6197 92 ml       Physical Exam:     General Appearance: Alert, appears stated age and cooperative  Lungs: Clear to auscultation bilaterally, no rales or rhonchi, no labored breathing/accessory muscle use  Heart: Regular rate and rhythm, S1, S2 normal, no murmur, click, rub or gallop  Abdomen: Non-distended, soft, BS active, (+) TTP in the epigastric region  Extremities: No cyanosis, edema    Invasive Devices     Peripheral Intravenous Line            Peripheral IV 11/11/19 Right Antecubital 2 days                Lab Results:  Results from last 7 days   Lab Units 11/13/19  0453   WBC Thousand/uL 5 84   HEMOGLOBIN g/dL 11 8*   HEMATOCRIT % 39 5   PLATELETS Thousands/uL 110*   NEUTROS PCT % 56   LYMPHS PCT % 34   MONOS PCT % 7   EOS PCT % 3     Results from last 7 days   Lab Units 11/13/19  0453   POTASSIUM mmol/L 3 7   CHLORIDE mmol/L 103   CO2 mmol/L 30   BUN mg/dL 4*   CREATININE mg/dL 0 70   CALCIUM mg/dL 8 6   ALK PHOS U/L 96   ALT U/L 10*   AST U/L 12         Results from last 7 days   Lab Units 11/13/19  0453   LIPASE u/L 549*   AMYLASE IU/L 40       Imaging Studies: I have personally reviewed pertinent imaging studies  Ct Abdomen Pelvis Wo Contrast  Result Date: 11/11/2019  Impression: Findings suggestive of mild acute or subacute pancreatitis   New 4 5 x 2 5 x 3 cm fluid collection anterior to the proximal jejunum immediately distal to the ligament of Treitz attributed to complex pancreatic pseudocyst  New additional potentially communicating smaller collections in the left upper abdomen, the larger of which measures 3 4 x 2 3 x 2 2 cm anterior to the proximal descending colon  The smaller collection immediately superolateral to the dominant collection adjacent to the proximal jejunum measures 1 4 x 1 x 1 4 cm also attributed to complex pancreatic pseudocysts  Interval decreased size of proximal gastric intramural fluid collection currently approximately 1 4 x 0 8 x 0 8 cm and previously 2 8 x 2 x 2 4 cm   The study was marked in EPIC for immediate notification      Assessment and Plan:     Multiple Pancreatic Pseudocysts  Recurrent Idiopathic Pancreatitis  Unintentional Weight Loss  - CT A/P on admission with multiple pseudocysts present, the largest measuring 4 5 x 2 5 x 3 cm fluid collection anterior to proximal jejunum, there is decrease in size of the gastric intramural fluid collection  - Suspect he is having recurrent episodes of pancreatitis causing recurrent pseudocyst formation, unfortunately unclear etiology of recurrent pancreatitis  - No alcohol abuse, RUQ US negative for cholelithiasis/sludge, TG normal, IgG4 pending  - Lisinopril stopped previously for possible medication induced pancreatitis, will defer further use of omeprazole and ranitidine from now on as well  - Pending further review of imaging with advanced endoscopist for consideration of repeat EUS to evaluate for microlithiasis/sludge and also for possible cyst gastrostomy  - Tolerating clear liquids, advance to full liquid/toast/crackers  - Ensure supplementation  - Decrease LR to 125 ml/hr  - Add scheduled reglan q6, zofran PRN  - Add carafate 1 g BID  - Check QTc with EKG  - Serial abdominal exams  - Incentive spirometry and DVT prophylaxis while hospitalized      The patient will be seen by Dr Rahat Hansen

## 2019-11-13 NOTE — ASSESSMENT & PLAN NOTE
· Continue daily methadone  · [facility] [place]

## 2019-11-13 NOTE — SOCIAL WORK
Per rn during rounds pt continues to be on clears for pancreatitis and has nausea  Pt reportedly has continuous pain 8/10  RN to discuss further with [facility]

## 2019-11-13 NOTE — ASSESSMENT & PLAN NOTE
· Demonstrated on previous EKG, but appears resolved on latest EKG  · Exercise caution with QT prolonging medications

## 2019-11-14 VITALS
WEIGHT: 265 LBS | BODY MASS INDEX: 40.16 KG/M2 | OXYGEN SATURATION: 95 % | HEART RATE: 64 BPM | DIASTOLIC BLOOD PRESSURE: 92 MMHG | RESPIRATION RATE: 20 BRPM | SYSTOLIC BLOOD PRESSURE: 164 MMHG | TEMPERATURE: 98.4 F | HEIGHT: 68 IN

## 2019-11-14 PROBLEM — E43 SEVERE PROTEIN-CALORIE MALNUTRITION (HCC): Status: ACTIVE | Noted: 2019-11-14

## 2019-11-14 LAB
IGG SERPL-MCNC: 953 MG/DL (ref 700–1600)
IGG1 SER-MCNC: 529 MG/DL (ref 248–810)
IGG2 SER-MCNC: 176 MG/DL (ref 130–555)
IGG3 SER-MCNC: 52 MG/DL (ref 15–102)
IGG4 SER-MCNC: 69 MG/DL (ref 2–96)

## 2019-11-14 PROCEDURE — 99238 HOSP IP/OBS DSCHRG MGMT 30/<: CPT | Performed by: NURSE PRACTITIONER

## 2019-11-14 PROCEDURE — 99232 SBSQ HOSP IP/OBS MODERATE 35: CPT | Performed by: INTERNAL MEDICINE

## 2019-11-14 RX ORDER — OXYCODONE HYDROCHLORIDE 5 MG/1
5 TABLET ORAL EVERY 6 HOURS PRN
Qty: 10 TABLET | Refills: 0 | Status: SHIPPED | OUTPATIENT
Start: 2019-11-14 | End: 2019-11-24

## 2019-11-14 RX ORDER — SUCRALFATE ORAL 1 G/10ML
1000 SUSPENSION ORAL
Qty: 420 ML | Refills: 0 | Status: SHIPPED | OUTPATIENT
Start: 2019-11-14

## 2019-11-14 RX ADMIN — HYDROMORPHONE HYDROCHLORIDE 2 MG: 2 INJECTION INTRAMUSCULAR; INTRAVENOUS; SUBCUTANEOUS at 08:19

## 2019-11-14 RX ADMIN — METOCLOPRAMIDE 10 MG: 5 INJECTION, SOLUTION INTRAMUSCULAR; INTRAVENOUS at 05:55

## 2019-11-14 RX ADMIN — SODIUM CHLORIDE, SODIUM LACTATE, POTASSIUM CHLORIDE, AND CALCIUM CHLORIDE 125 ML/HR: .6; .31; .03; .02 INJECTION, SOLUTION INTRAVENOUS at 00:17

## 2019-11-14 RX ADMIN — OXYCODONE HYDROCHLORIDE 10 MG: 10 TABLET ORAL at 05:55

## 2019-11-14 RX ADMIN — HYDROMORPHONE HYDROCHLORIDE 2 MG: 2 INJECTION INTRAMUSCULAR; INTRAVENOUS; SUBCUTANEOUS at 11:36

## 2019-11-14 RX ADMIN — METOCLOPRAMIDE 10 MG: 5 INJECTION, SOLUTION INTRAMUSCULAR; INTRAVENOUS at 11:36

## 2019-11-14 RX ADMIN — METHADONE HYDROCHLORIDE 100 MG: 10 CONCENTRATE ORAL at 08:18

## 2019-11-14 RX ADMIN — HYDROMORPHONE HYDROCHLORIDE 2 MG: 2 INJECTION INTRAMUSCULAR; INTRAVENOUS; SUBCUTANEOUS at 03:24

## 2019-11-14 RX ADMIN — PREGABALIN 100 MG: 100 CAPSULE ORAL at 08:18

## 2019-11-14 RX ADMIN — SUCRALFATE 1000 MG: 1 SUSPENSION ORAL at 08:18

## 2019-11-14 RX ADMIN — OXYCODONE HYDROCHLORIDE 10 MG: 10 TABLET ORAL at 13:17

## 2019-11-14 RX ADMIN — ONDANSETRON 4 MG: 2 INJECTION INTRAMUSCULAR; INTRAVENOUS at 03:24

## 2019-11-14 RX ADMIN — ATENOLOL 100 MG: 50 TABLET ORAL at 08:18

## 2019-11-14 RX ADMIN — SODIUM CHLORIDE, SODIUM LACTATE, POTASSIUM CHLORIDE, AND CALCIUM CHLORIDE 125 ML/HR: .6; .31; .03; .02 INJECTION, SOLUTION INTRAVENOUS at 08:10

## 2019-11-14 NOTE — DISCHARGE INSTRUCTIONS
Cigarette Smoking and Your Health, Ambulatory Care   GENERAL INFORMATION:   What are the risks to my health if I smoke? Chemicals in tobacco are addictive and damage every cell in your body  All tobacco products are dangerous to you and to nonsmokers who breathe your secondhand smoke  Even if you are a light smoker or a social smoker, you have an increased risk for cancer, heart disease, and lung disease  If you are pregnant or have diabetes, smoking increases your risk for complications  What are the benefits to my health if I stop smoking? · Lower risk of cancer, heart disease, blood clots, heart attack, and stroke    · Lower risk of diabetes complications, such as kidney, artery, or eye disease, and nerve damage that can result in amputations    · Lower risk of lung infections and diseases, such as pneumonia, asthma, chronic bronchitis, and emphysema           · Increased benefits of chemotherapy if you already have cancer, and decreased risk for cancer returning after treatment    · Improved circulation, allowing more oxygen to be delivered to your body    · Improved ability to heal and to fight infections  What are the benefits to the health of others if I stop smoking? · Lower risk of lung cancer and heart disease in nonsmokers    · Lower risk of miscarriage, early delivery, low birth weight, and stillbirth    · Lower risk of SIDS, obesity, developmental delay, ear infections, colds, pneumonia, bronchitis, asthma, and ADHD in your child  Where can I find more information and support to stop smoking? It is never too late to stop smoking  Ask your healthcare provider for more information if you need help  · [facility]  [employer]  Phone: 2- [phone]  Web Address: [employer]  Follow up with your healthcare provider as directed:  Write down your questions so you remember to ask them during your visits  CARE AGREEMENT:   You have the right to help plan your care   Learn about your health condition and how it may be treated  Discuss treatment options with your caregivers to decide what care you want to receive  You always have the right to refuse treatment  The above information is an  only  It is not intended as medical advice for individual conditions or treatments  Talk to your doctor, nurse or pharmacist before following any medical regimen to see if it is safe and effective for you  © 2014 [address] is for End User's use only and may not be sold, redistributed or otherwise used for commercial purposes  All illustrations and images included in CareNotes® are the copyrighted property of [employer]  or [employer]  Chronic Hypertension   WHAT YOU NEED TO KNOW:   Hypertension is high blood pressure (BP)  Your BP is the force of your blood moving against the walls of your arteries  Normal BP is less than 120/80  Prehypertension is between 120/80 and 139/89  Hypertension is 140/90 or higher  Hypertension causes your BP to get so high that your heart has to work much harder than normal  This can damage your heart  Chronic hypertension is a long-term condition that you can control with a healthy lifestyle or medicines  A controlled blood pressure helps protect your organs, such as your heart, lungs, brain, and kidneys  DISCHARGE INSTRUCTIONS:   Call 911 for any of the following:   · You have discomfort in your chest that feels like squeezing, pressure, fullness, or pain  · You become confused or have difficulty speaking  · You suddenly feel lightheaded or have trouble breathing  · You have pain or discomfort in your back, neck, jaw, stomach, or arm  Seek care immediately if:   · You have a severe headache or vision loss  · You have weakness in an arm or leg  Contact your healthcare provider if:   · You feel faint, dizzy, confused, or drowsy       · You have been taking your BP medicine and your BP is still higher than your healthcare provider says it should be  · You have questions or concerns about your condition or care  Medicines: You may need any of the following:  · Medicine  may be used to help lower your BP  You may need more than one type of medicine  Take the medicine exactly as directed  · Diuretics  help decrease extra fluid that collects in your body  This will help lower your BP  You may urinate more often while you take this medicine  · Cholesterol medicine  helps lower your cholesterol level  A low cholesterol level helps prevent heart disease and makes it easier to control your blood pressure  · Take your medicine as directed  Contact your healthcare provider if you think your medicine is not helping or if you have side effects  Tell him or her if you are allergic to any medicine  Keep a list of the medicines, vitamins, and herbs you take  Include the amounts, and when and why you take them  Bring the list or the pill bottles to follow-up visits  Carry your medicine list with you in case of an emergency  Follow up with your healthcare provider as directed: You will need to return to have your blood pressure checked and to have other lab tests done  Write down your questions so you remember to ask them during your visits  Manage chronic hypertension:  Talk with your healthcare provider about these and other ways to manage hypertension:  · Take your BP at home  Sit and rest for 5 minutes before you take your BP  Extend your arm and support it on a flat surface  Your arm should be at the same level as your heart  Follow the directions that came with your BP monitor  If possible, take at least 2 BP readings each time  Take your BP at least twice a day at the same times each day, such as morning and evening  Keep a record of your BP readings and bring it to your follow-up visits  Ask your healthcare provider what your blood pressure should be  · Limit sodium (salt) as directed    Too much sodium can affect your fluid balance  Check labels to find low-sodium or no-salt-added foods  Some low-sodium foods use potassium salts for flavor  Too much potassium can also cause health problems  Your healthcare provider will tell you how much sodium and potassium are safe for you to have in a day  He or she may recommend that you limit sodium to 2,300 mg a day  · Follow the meal plan recommended by your healthcare provider  A dietitian or your provider can give you more information on low-sodium plans or the DASH (Dietary Approaches to Stop Hypertension) eating plan  The DASH plan is low in sodium, unhealthy fats, and total fat  It is high in potassium, calcium, and fiber  · Exercise to maintain a healthy weight  Exercise at least 30 minutes per day, on most days of the week  This will help decrease your blood pressure  Ask about the best exercise plan for you  · Decrease stress  This may help lower your BP  Learn ways to relax, such as deep breathing or listening to music  · Limit alcohol  Women should limit alcohol to 1 drink a day  Men should limit alcohol to 2 drinks a day  A drink of alcohol is 12 ounces of beer, 5 ounces of wine, or 1½ ounces of liquor  · Do not smoke  Nicotine and other chemicals in cigarettes and cigars can increase your BP and also cause lung damage  Ask your healthcare provider for information if you currently smoke and need help to quit  E-cigarettes or smokeless tobacco still contain nicotine  Talk to your healthcare provider before you use these products  © 2017 [address] Information is for End User's use only and may not be sold, redistributed or otherwise used for commercial purposes  All illustrations and images included in CareNotes® are the copyrighted property of [employer] M , [place]  or Antwon Nunes  The above information is an  only  It is not intended as medical advice for individual conditions or treatments  Talk to your doctor, nurse or pharmacist before following any medical regimen to see if it is safe and effective for you  Obesity   AMBULATORY CARE:   Obesity  is when your body mass index (BMI) is greater than 30  Your healthcare provider will use your height and weight to measure your BMI  The risks of obesity include  many health problems, such as injuries or physical disability  You may need tests to check for the following:  · Diabetes     · High blood pressure or high cholesterol     · Heart disease     · Gallbladder or liver disease     · Cancer of the colon, breast, prostate, liver, or kidney     · Sleep apnea     · Arthritis or gout  Seek care immediately if:   · You have a severe headache, confusion, or difficulty speaking  · You have weakness on one side of your body  · You have chest pain, sweating, or shortness of breath  Contact your healthcare provider if:   · You have symptoms of gallbladder or liver disease, such as pain in your upper abdomen  · You have knee or hip pain and discomfort while walking  · You have symptoms of diabetes, such as intense hunger and thirst, and frequent urination  · You have symptoms of sleep apnea, such as snoring or daytime sleepiness  · You have questions or concerns about your condition or care  Treatment for obesity  focuses on helping you lose weight to improve your health  Even a small decrease in BMI can reduce the risk for many health problems  Your healthcare provider will help you set a weight-loss goal   · Lifestyle changes  are the first step in treating obesity  These include making healthy food choices and getting regular physical activity  Your healthcare provider may suggest a weight-loss program that involves coaching, education, and therapy  · Medicine  may help you lose weight when it is used with a healthy diet and physical activity       · Surgery  can help you lose weight if you are very obese and have other health problems  There are several types of weight-loss surgery  Ask your healthcare provider for more information  Be successful losing weight:   · Set small, realistic goals  An example of a small goal is to walk for 20 minutes 5 days a week  Anther goal is to lose 5% of your body weight  · Tell friends, family members, and coworkers about your goals  and ask for their support  Ask a friend to lose weight with you, or join a weight-loss support group  · Identify foods or triggers that may cause you to overeat , and find ways to avoid them  Remove tempting high-calorie foods from your home and workplace  Place a bowl of fresh fruit on your kitchen counter  If stress causes you to eat, then find other ways to cope with stress  · Keep a diary to track what you eat and drink  Also write down how many minutes of physical activity you do each day  Weigh yourself once a week and record it in your diary  Eating changes: You will need to eat 500 to 1,000 fewer calories each day than you currently eat to lose 1 to 2 pounds a week  The following changes will help you cut calories:  · Eat smaller portions  Use small plates, no larger than 9 inches in diameter  Fill your plate half full of fruits and vegetables  Measure your food using measuring cups until you know what a serving size looks like  · Eat 3 meals and 1 or 2 snacks each day  Plan your meals in advance  Elin [place] and eat at home most of the time  Eat slowly  · Eat fruits and vegetables at every meal   They are low in calories and high in fiber, which makes you feel full  Do not add butter, margarine, or cream sauce to vegetables  Use herbs to season steamed vegetables  · Eat less fat and fewer fried foods  Eat more baked or grilled chicken and fish  These protein sources are lower in calories and fat than red meat  Limit fast food  Dress your salads with olive oil and vinegar instead of bottled dressing  · Limit the amount of sugar you eat  Do not drink sugary beverages  Limit alcohol  Activity changes:  Physical activity is good for your body in many ways  It helps you burn calories and build strong muscles  It decreases stress and depression, and improves your mood  It can also help you sleep better  Talk to your healthcare provider before you begin an exercise program   · Exercise for at least 30 minutes 5 days a week  Start slowly  Set aside time each day for physical activity that you enjoy and that is convenient for you  It is best to do both weight training and an activity that increases your heart rate, such as walking, bicycling, or swimming  · Find ways to be more active  Do yard work and housecleaning  Walk up the stairs instead of using elevators  Spend your leisure time going to events that require walking, such as outdoor festivals or fairs  This extra physical activity can help you lose weight and keep it off  Follow up with your healthcare provider as directed: You may need to meet with a dietitian  Write down your questions so you remember to ask them during your visits  © 2017 [address] [facility] Information is for End User's use only and may not be sold, redistributed or otherwise used for commercial purposes  All illustrations and images included in CareNotes® are the copyrighted property of A D A M , Inc  or Antwon Nunes  The above information is an  only  It is not intended as medical advice for individual conditions or treatments  Talk to your doctor, nurse or pharmacist before following any medical regimen to see if it is safe and effective for you  Pancreatitis   WHAT YOU NEED TO KNOW:   Pancreatitis is inflammation of your pancreas  The pancreas is an organ that makes insulin  It also makes enzymes (digestive juices) that help your body digest food  Pancreatitis may be an acute (short-term) problem that happens only once   It may become a chronic (long-term) problem that comes and goes over time  DISCHARGE INSTRUCTIONS:   Return to the emergency department if:   · You have severe pain in your abdomen and you are vomiting  Contact your healthcare provider if:   · You have a fever  · You continue to lose weight  · Your skin or the whites of your eyes turn yellow  · You have questions or concerns about your condition or care  Medicines: You may need any of the following:  · Antibiotics  treat a bacterial infection  · Prescription pain medicine  may be given  Ask your healthcare provider how to take this medicine safely  Some prescription pain medicines contain acetaminophen  Do not take other medicines that contain acetaminophen without talking to your healthcare provider  Too much acetaminophen may cause liver damage  Prescription pain medicine may cause constipation  Ask your healthcare provider how to prevent or treat constipation  · Take your medicine as directed  Contact your healthcare provider if you think your medicine is not helping or if you have side effects  Tell him or her if you are allergic to any medicine  Keep a list of the medicines, vitamins, and herbs you take  Include the amounts, and when and why you take them  Bring the list or the pill bottles to follow-up visits  Carry your medicine list with you in case of an emergency  Self-care:   · Rest  when you feel it is needed  Slowly start to do more each day  Return to your usual activities as directed  · Do not drink any alcohol  If you need help to stop drinking, contact the following organization:   ¨ Alcohol[employer] Anonymous  Web Address: http://www [employer]/      · Ask your healthcare provider or dietitian about the best foods to eat  You may need to eat foods that are low in fat if you have chronic pancreatitis  Follow up with your healthcare provider as directed:  Write down your questions so you remember to ask them during your visits     © 2017 [address] Information is for End User's use only and may not be sold, redistributed or otherwise used for commercial purposes  All illustrations and images included in CareNotes® are the copyrighted property of [employer] M [employer] Inc  or [employer]twon Nunes  The above information is an  only  It is not intended as medical advice for individual conditions or treatments  Talk to your doctor, nurse or pharmacist before following any medical regimen to see if it is safe and effective for you  Opioid Dependence   WHAT YOU NEED TO KNOW:   What is opioid dependence? Opioids are medicines, such as morphine and codeine, used to treat pain  Dependence happens after you have used opioids regularly for a long period of time  Dependence means that your body gets used to how much medicine you take  Dependence is not the same as addiction  Addiction means that a person uses opioids to get high instead of using them to control pain  What are the signs and symptoms of opioid dependence? · You need more of the opioid to get the same amount of pain relief as you did when you first started taking it  · You have tried to use less opioid medicine but are not able to  · You have withdrawal symptoms when you take less of the opioid  What are the signs and symptoms of opioid withdrawal?  You may have the following signs and symptoms if you suddenly stop taking opioids or if you decrease the amount you normally take:  · Yawning     · Runny nose     · Nausea or vomiting     · Diarrhea     · Chills or goosebumps    · Muscle aches or cramps     · Anxiety  How is opioid dependence diagnosed? Your healthcare provider will do a physical exam  He will ask you questions about your symptoms and your use of opioids  He will also ask about your current and past use of other drugs and any family history of drug abuse  How is opioid dependence treated? You may be treated in a hospital or you may be treated as an outpatient   During detoxification (detox), healthcare providers will slowly decrease your dose of the opioid medicine you are dependent on  They may use another opioid medicine such as methadone to decrease symptoms of withdrawal  You may need to take this or another medicine for some time  Your healthcare provider will also replace the opioid with another pain medicine that is less likely to cause dependence  He may also suggest that you receive counseling and social support during treatment  What are the risks of opioid dependence? There is a risk of overdose during early treatment with methadone  You may become dependent on the medicines used to treat opioid dependence  Without treatment, you may develop other health problems or become addicted to opioids  Your risk of abusing alcohol and other drugs increases  You may also develop risky behaviors that can lead to an overdose, violence, and suicidal thoughts  When should I contact my healthcare provider? · Your speech is slurred  · You have difficulty staying awake  · You have nausea and vomiting  · You are easily upset or cry easily  · You have poor balance  · You have questions or concerns about your condition or care  When should I seek immediate care or call 911? · You feel lightheaded or faint  · You have a fast, slow, or irregular heartbeat  · You have a seizure  CARE AGREEMENT:   You have the right to help plan your care  Learn about your health condition and how it may be treated  Discuss treatment options with your caregivers to decide what care you want to receive  You always have the right to refuse treatment  The above information is an  only  It is not intended as medical advice for individual conditions or treatments  Talk to your doctor, nurse or pharmacist before following any medical regimen to see if it is safe and effective for you    © 2017 [address] Information is for End User's use only and may not be sold, redistributed or otherwise used for commercial purposes  All illustrations and images included in CareNotes® are the copyrighted property of A [employer] M [employer] Inc  or Antwon Nunes

## 2019-11-14 NOTE — ASSESSMENT & PLAN NOTE
Malnutrition Findings:   Malnutrition type: Chronic illness  Degree of Malnutrition: Other severe protein calorie malnutrition   Patient having decreased p o  Intake related to acute pancreatitis however patient is overall obese  BMI Findings:  BMI Classifications: Morbid Obesity 40-44 9     Body mass index is 40 29 kg/m²

## 2019-11-14 NOTE — DISCHARGE INSTR - AVS FIRST PAGE
Thank you for choosing [facility], please take all prescriptions as instructed, please make appropriate follow-up visits

## 2019-11-14 NOTE — ASSESSMENT & PLAN NOTE
As noted on previous imaging  He has had recurrent cyst in the past that have required drainage  Now there appears to be new collection per CT imaging    · GI following - EUS outpatient, trying to coordinate this with Dr Mike Coello, advanced endoscopist at [facility], to evaluate for further drainage   · See plan for pancreatitis

## 2019-11-14 NOTE — ASSESSMENT & PLAN NOTE
Patient has a well-known history of recurrent pancreatitis as well as pancreatic pseudocyst   This was thought to be secondary to smoking and obesity  He follows up with GI as an outpatient  He was hospitalized in late September due to symptoms of a pseudocyst causing compression on the stomach, he had an EUS after discharge, and had the cyst drained    · Patient tolerated diet advancement  · GI following, appreciate input - trying to coordinate EUS outpatient with [facility], will follow up outpatient  · Recheck triglycerides is normal; pending autoimmune pancreatitis markers, will follow-up result outpatient   · Discharged on Carafate and Reglan  · Discontinued PPI and Ranitidine   · Will need follow-up with Dr Jorge Ron in [place]

## 2019-11-14 NOTE — SOCIAL WORK
CM received message from [facility], [address], [address] [place] [address]  CM attempted to cb and lm with update

## 2019-11-14 NOTE — SOCIAL WORK
Per RN during rounds pt has pancretitis with pancreatic cyst  GI following pt  Pt may need eus pending adv diet

## 2019-11-14 NOTE — PLAN OF CARE
Problem: Nutrition/Hydration-ADULT  Goal: Nutrient/Hydration intake appropriate for improving, restoring or maintaining nutritional needs  Description  Monitor and assess patient's nutrition/hydration status for malnutrition  Collaborate with interdisciplinary team and initiate plan and interventions as ordered  Monitor patient's weight and dietary intake as ordered or per policy  Utilize nutrition screening tool and intervene as necessary  Determine patient's food preferences and provide high-protein, high-caloric foods as appropriate       INTERVENTIONS:  - Monitor oral intake, urinary output, labs, and treatment plans  - Assess nutrition and hydration status and recommend course of action  - Evaluate amount of meals eaten  - Assist patient with eating if necessary   - Allow adequate time for meals  - Recommend/ encourage appropriate diets, oral nutritional supplements, and vitamin/mineral supplements  - Order, calculate, and assess calorie counts as needed  - Recommend, monitor, and adjust tube feedings and TPN/PPN based on assessed needs  - Assess need for intravenous fluids  - Provide specific nutrition/hydration education as appropriate  - Include patient/family/caregiver in decisions related to nutrition  Outcome: Progressing     Problem: PAIN - ADULT  Goal: Verbalizes/displays adequate comfort level or baseline comfort level  Description  Interventions:  - Encourage patient to monitor pain and request assistance  - Assess pain using appropriate pain scale  - Administer analgesics based on type and severity of pain and evaluate response  - Implement non-pharmacological measures as appropriate and evaluate response  - Consider cultural and social influences on pain and pain management  - Notify physician/advanced practitioner if interventions unsuccessful or patient reports new pain  Outcome: Progressing     Problem: INFECTION - ADULT  Goal: Absence or prevention of progression during hospitalization  Description  INTERVENTIONS:  - Assess and monitor for signs and symptoms of infection  - Monitor lab/diagnostic results  - Monitor all insertion sites, i e  indwelling lines, tubes, and drains  - Monitor endotracheal if appropriate and nasal secretions for changes in amount and color  - [place] appropriate cooling/warming therapies per order  - Administer medications as ordered  - Instruct and encourage patient and family to use good hand hygiene technique  - Identify and instruct in appropriate isolation precautions for identified infection/condition  Outcome: Progressing  Goal: Absence of fever/infection during neutropenic period  Description  INTERVENTIONS:  - Monitor WBC    Outcome: Progressing     Problem: SAFETY ADULT  Goal: Patient will remain free of falls  Description  INTERVENTIONS:  - Assess patient frequently for physical needs  -  Identify cognitive and physical deficits and behaviors that affect risk of falls    -  [place] fall precautions as indicated by assessment   - Educate patient/family on patient safety including physical limitations  - Instruct patient to call for assistance with activity based on assessment  - Modify environment to reduce risk of injury  - Consider OT/PT consult to assist with strengthening/mobility  Outcome: Progressing  Goal: Maintain or return to baseline ADL function  Description  INTERVENTIONS:  -  Assess patient's ability to carry out ADLs; assess patient's baseline for ADL function and identify physical deficits which impact ability to perform ADLs (bathing, care of mouth/teeth, toileting, grooming, dressing, etc )  - Assess/evaluate cause of self-care deficits   - Assess range of motion  - Assess patient's mobility; develop plan if impaired  - Assess patient's need for assistive devices and provide as appropriate  - Encourage maximum independence but intervene and supervise when necessary  - Involve family in performance of ADLs  - Assess for home care needs following discharge   - Consider OT consult to assist with ADL evaluation and planning for discharge  - Provide patient education as appropriate  Outcome: Progressing  Goal: Maintain or return mobility status to optimal level  Description  INTERVENTIONS:  - Assess patient's baseline mobility status (ambulation, transfers, stairs, etc )    - Identify cognitive and physical deficits and behaviors that affect mobility  - Identify mobility aids required to assist with transfers and/or ambulation (gait belt, sit-to-stand, lift, walker, cane, etc )  - [place] fall precautions as indicated by assessment  - Record patient progress and toleration of activity level on Mobility SBAR; progress patient to next Phase/Stage  - Instruct patient to call for assistance with activity based on assessment  - Consider rehabilitation consult to assist with strengthening/weightbearing, etc   Outcome: Progressing     Problem: DISCHARGE PLANNING  Goal: Discharge to home or other facility with appropriate resources  Description  INTERVENTIONS:  - Identify barriers to discharge w/patient and caregiver  - Arrange for needed discharge resources and transportation as appropriate  - Identify discharge learning needs (meds, wound care, etc )  - Arrange for interpretive services to assist at discharge as needed  - Refer to Case Management Department for coordinating discharge planning if the patient needs post-hospital services based on physician/advanced practitioner order or complex needs related to functional status, cognitive ability, or social support system  Outcome: Progressing     Problem: Knowledge Deficit  Goal: Patient/family/caregiver demonstrates understanding of disease process, treatment plan, medications, and discharge instructions  Description  Complete learning assessment and assess knowledge base    Interventions:  - Provide teaching at level of understanding  - Provide teaching via preferred learning methods  Outcome: Progressing     Problem: GASTROINTESTINAL - ADULT  Goal: Minimal or absence of nausea and/or vomiting  Description  INTERVENTIONS:  - Administer IV fluids if ordered to ensure adequate hydration  - Maintain NPO status until nausea and vomiting are resolved  - Nasogastric tube if ordered  - Administer ordered antiemetic medications as needed  - Provide nonpharmacologic comfort measures as appropriate  - Advance diet as tolerated, if ordered  - Consider nutrition services referral to assist patient with adequate nutrition and appropriate food choices  Outcome: Progressing  Goal: Maintains or returns to baseline bowel function  Description  INTERVENTIONS:  - Assess bowel function  - Encourage oral fluids to ensure adequate hydration  - Administer IV fluids if ordered to ensure adequate hydration  - Administer ordered medications as needed  - Encourage mobilization and activity  - Consider nutritional services referral to assist patient with adequate nutrition and appropriate food choices  Outcome: Progressing  Goal: Maintains adequate nutritional intake  Description  INTERVENTIONS:  - Monitor percentage of each meal consumed  - Identify factors contributing to decreased intake, treat as appropriate  - Assist with meals as needed  - Monitor I&O, weight, and lab values if indicated  - Obtain nutrition services referral as needed  Outcome: Progressing     Problem: Potential for Falls  Goal: Patient will remain free of falls  Description  INTERVENTIONS:  - Assess patient frequently for physical needs  -  Identify cognitive and physical deficits and behaviors that affect risk of falls    -  [place] fall precautions as indicated by assessment   - Educate patient/family on patient safety including physical limitations  - Instruct patient to call for assistance with activity based on assessment  - Modify environment to reduce risk of injury  - Consider OT/PT consult to assist with strengthening/mobility  Outcome: Progressing

## 2019-11-14 NOTE — PLAN OF CARE
Problem: Nutrition/Hydration-ADULT  Goal: Nutrient/Hydration intake appropriate for improving, restoring or maintaining nutritional needs  Description  Monitor and assess patient's nutrition/hydration status for malnutrition  Collaborate with interdisciplinary team and initiate plan and interventions as ordered  Monitor patient's weight and dietary intake as ordered or per policy  Utilize nutrition screening tool and intervene as necessary  Determine patient's food preferences and provide high-protein, high-caloric foods as appropriate       INTERVENTIONS:  - Monitor oral intake, urinary output, labs, and treatment plans  - Assess nutrition and hydration status and recommend course of action  - Evaluate amount of meals eaten  - Assist patient with eating if necessary   - Allow adequate time for meals  - Recommend/ encourage appropriate diets, oral nutritional supplements, and vitamin/mineral supplements  - Order, calculate, and assess calorie counts as needed  - Recommend, monitor, and adjust tube feedings and TPN/PPN based on assessed needs  - Assess need for intravenous fluids  - Provide specific nutrition/hydration education as appropriate  - Include patient/family/caregiver in decisions related to nutrition  Outcome: Progressing     Problem: PAIN - ADULT  Goal: Verbalizes/displays adequate comfort level or baseline comfort level  Description  Interventions:  - Encourage patient to monitor pain and request assistance  - Assess pain using appropriate pain scale  - Administer analgesics based on type and severity of pain and evaluate response  - Implement non-pharmacological measures as appropriate and evaluate response  - Consider cultural and social influences on pain and pain management  - Notify physician/advanced practitioner if interventions unsuccessful or patient reports new pain  Outcome: Progressing     Problem: INFECTION - ADULT  Goal: Absence or prevention of progression during hospitalization  Description  INTERVENTIONS:  - Assess and monitor for signs and symptoms of infection  - Monitor lab/diagnostic results  - Monitor all insertion sites, i e  indwelling lines, tubes, and drains  - Monitor endotracheal if appropriate and nasal secretions for changes in amount and color  - [place] appropriate cooling/warming therapies per order  - Administer medications as ordered  - Instruct and encourage patient and family to use good hand hygiene technique  - Identify and instruct in appropriate isolation precautions for identified infection/condition  Outcome: Progressing  Goal: Absence of fever/infection during neutropenic period  Description  INTERVENTIONS:  - Monitor WBC    Outcome: Progressing     Problem: SAFETY ADULT  Goal: Patient will remain free of falls  Description  INTERVENTIONS:  - Assess patient frequently for physical needs  -  Identify cognitive and physical deficits and behaviors that affect risk of falls    -  [place] fall precautions as indicated by assessment   - Educate patient/family on patient safety including physical limitations  - Instruct patient to call for assistance with activity based on assessment  - Modify environment to reduce risk of injury  - Consider OT/PT consult to assist with strengthening/mobility  Outcome: Progressing  Goal: Maintain or return to baseline ADL function  Description  INTERVENTIONS:  -  Assess patient's ability to carry out ADLs; assess patient's baseline for ADL function and identify physical deficits which impact ability to perform ADLs (bathing, care of mouth/teeth, toileting, grooming, dressing, etc )  - Assess/evaluate cause of self-care deficits   - Assess range of motion  - Assess patient's mobility; develop plan if impaired  - Assess patient's need for assistive devices and provide as appropriate  - Encourage maximum independence but intervene and supervise when necessary  - Involve family in performance of ADLs  - Assess for home care needs following discharge   - Consider OT consult to assist with ADL evaluation and planning for discharge  - Provide patient education as appropriate  Outcome: Progressing  Goal: Maintain or return mobility status to optimal level  Description  INTERVENTIONS:  - Assess patient's baseline mobility status (ambulation, transfers, stairs, etc )    - Identify cognitive and physical deficits and behaviors that affect mobility  - Identify mobility aids required to assist with transfers and/or ambulation (gait belt, sit-to-stand, lift, walker, cane, etc )  - [place] fall precautions as indicated by assessment  - Record patient progress and toleration of activity level on Mobility SBAR; progress patient to next Phase/Stage  - Instruct patient to call for assistance with activity based on assessment  - Consider rehabilitation consult to assist with strengthening/weightbearing, etc   Outcome: Progressing     Problem: DISCHARGE PLANNING  Goal: Discharge to home or other facility with appropriate resources  Description  INTERVENTIONS:  - Identify barriers to discharge w/patient and caregiver  - Arrange for needed discharge resources and transportation as appropriate  - Identify discharge learning needs (meds, wound care, etc )  - Arrange for interpretive services to assist at discharge as needed  - Refer to Case Management Department for coordinating discharge planning if the patient needs post-hospital services based on physician/advanced practitioner order or complex needs related to functional status, cognitive ability, or social support system  Outcome: Progressing     Problem: Knowledge Deficit  Goal: Patient/family/caregiver demonstrates understanding of disease process, treatment plan, medications, and discharge instructions  Description  Complete learning assessment and assess knowledge base    Interventions:  - Provide teaching at level of understanding  - Provide teaching via preferred learning methods  Outcome: Progressing     Problem: GASTROINTESTINAL - ADULT  Goal: Minimal or absence of nausea and/or vomiting  Description  INTERVENTIONS:  - Administer IV fluids if ordered to ensure adequate hydration  - Maintain NPO status until nausea and vomiting are resolved  - Nasogastric tube if ordered  - Administer ordered antiemetic medications as needed  - Provide nonpharmacologic comfort measures as appropriate  - Advance diet as tolerated, if ordered  - Consider nutrition services referral to assist patient with adequate nutrition and appropriate food choices  Outcome: Progressing  Goal: Maintains or returns to baseline bowel function  Description  INTERVENTIONS:  - Assess bowel function  - Encourage oral fluids to ensure adequate hydration  - Administer IV fluids if ordered to ensure adequate hydration  - Administer ordered medications as needed  - Encourage mobilization and activity  - Consider nutritional services referral to assist patient with adequate nutrition and appropriate food choices  Outcome: Progressing  Goal: Maintains adequate nutritional intake  Description  INTERVENTIONS:  - Monitor percentage of each meal consumed  - Identify factors contributing to decreased intake, treat as appropriate  - Assist with meals as needed  - Monitor I&O, weight, and lab values if indicated  - Obtain nutrition services referral as needed  Outcome: Progressing     Problem: Potential for Falls  Goal: Patient will remain free of falls  Description  INTERVENTIONS:  - Assess patient frequently for physical needs  -  Identify cognitive and physical deficits and behaviors that affect risk of falls    -  [place] fall precautions as indicated by assessment   - Educate patient/family on patient safety including physical limitations  - Instruct patient to call for assistance with activity based on assessment  - Modify environment to reduce risk of injury  - Consider OT/PT consult to assist with strengthening/mobility  Outcome: Progressing

## 2019-11-14 NOTE — PROGRESS NOTES
GI Progress Note - Trenia Lesch 37 y o  male MRN: [id number]    Unit/Bed#: -01 Encounter: [id number]    Subjective: He is feeling much better today  Still with upper abdominal pain but he feels this may be 2/2 retching prior to admission  Tolerating PO without vomiting  Objective:     Vitals: Blood pressure 164/92, pulse 64, temperature 98 4 °F (36 9 °C), temperature source Oral, resp  rate 20, height 5' 8" (1 727 m), weight 120 kg (265 lb), SpO2 95 %  ,Body mass index is 40 29 kg/m²  Intake/Output Summary (Last 24 hours) at 11/14/2019 1510  Last data filed at 11/14/2019 1359  Gross per 24 hour   Intake 1430 ml   Output 400 ml   Net 1030 ml       Physical Exam:     General Appearance: Alert, appears stated age and cooperative  Lungs: Clear to auscultation bilaterally, no rales or rhonchi, no labored breathing/accessory muscle use  Heart: Regular rate and rhythm, S1, S2 normal, no murmur, click, rub or gallop  Abdomen: Soft, non-tender, non-distended; bowel sounds normal; no masses or no organomegaly  Extremities: No cyanosis, edema    Invasive Devices     Peripheral Intravenous Line            Peripheral IV 11/13/19 Left Arm 1 day                Lab Results:  Results from last 7 days   Lab Units 11/13/19  0453   WBC Thousand/uL 5 84   HEMOGLOBIN g/dL 11 8*   HEMATOCRIT % 39 5   PLATELETS Thousands/uL 110*   NEUTROS PCT % 56   LYMPHS PCT % 34   MONOS PCT % 7   EOS PCT % 3     Results from last 7 days   Lab Units 11/13/19  0453   POTASSIUM mmol/L 3 7   CHLORIDE mmol/L 103   CO2 mmol/L 30   BUN mg/dL 4*   CREATININE mg/dL 0 70   CALCIUM mg/dL 8 6   ALK PHOS U/L 96   ALT U/L 10*   AST U/L 12         Results from last 7 days   Lab Units 11/13/19  0453   LIPASE u/L 549*   AMYLASE IU/L 40       Imaging Studies: I have personally reviewed pertinent imaging studies  Ct Abdomen Pelvis Wo Contrast    Result Date: 11/11/2019  Impression: Findings suggestive of mild acute or subacute pancreatitis   New 4 5 x 2 5 x 3 cm fluid collection anterior to the proximal jejunum immediately distal to the ligament of [place] attributed to complex pancreatic pseudocyst  New additional potentially communicating smaller collections in the left upper abdomen, the larger of which measures 3 4 x 2 3 x 2 2 cm anterior to the proximal descending colon  The smaller collection immediately superolateral to the dominant collection adjacent to the proximal jejunum measures 1 4 x 1 x 1 4 cm also attributed to complex pancreatic pseudocysts  Interval decreased size of proximal gastric intramural fluid collection currently approximately 1 4 x 0 8 x 0 8 cm and previously 2 8 x 2 x 2 4 cm  The study was marked in [facility] for immediate notification  Workstation performed: [id number]       Assessment and Plan:     Multiple Pancreatic Pseudocysts  Recurrent Idiopathic Pancreatitis  Unintentional Weight Loss  - CT A/P on admission with multiple pseudocysts present, the largest measuring 4 5 x 2 5 x 3 cm fluid collection anterior to proximal jejunum, there is decrease in size of the gastric intramural fluid collection  - Recurrent idiopathic pancreatitis unfortunately causing recurrent pseudocyst formation  - Likely plan for repeat EUS as outpatient to evaluate CBD for sludge/microlithiasis that could be missed on imaging and possible cyst gastrostomy pending advanced endoscopist review of imaging, will arrange as outpatient  - No alcohol use, imaging negative for cholelithiasis/sludge, TG normal, IgG4 pending  - Lisinopril stopped previously, will stop omeprazole and ranitidine as these can be associated with pancreatitis  - Continue reglan 5 mg TID AC and HS  - Zofran PRN  - Carafate 1 g BID and/or pepcid as needed  - Recommend ensure or boost supplementation as outpatient given significant weight loss of 100 lbs in the past 7-8 months due to recurrent symptoms and poor PO intake      The patient will be seen by Dr Ngozi [place]   Pt stable for discharge from GI standpoint

## 2019-11-14 NOTE — DISCHARGE SUMMARY
[address] [facility]  Discharge- Ksenia Rubio 1980, 37 y o  male MRN: [id number]    Unit/Bed#: -01 Encounter: [id number]    Primary Care Provider: Bree Moya DO   Date and time admitted to hospital: 11/11/2019  4:50 AM    * Acute on chronic pancreatitis [facility])  Assessment & Plan  Patient has a well-known history of recurrent pancreatitis as well as pancreatic pseudocyst   This was thought to be secondary to smoking and obesity  He follows up with GI as an outpatient  He was hospitalized in late September due to symptoms of a pseudocyst causing compression on the stomach, he had an EUS after discharge, and had the cyst drained  · Patient tolerated diet advancement  · GI following, appreciate input - trying to coordinate EUS outpatient with [facility], will follow up outpatient  · Recheck triglycerides is normal; pending autoimmune pancreatitis markers, will follow-up result outpatient   · Discharged on Carafate and Reglan  · Discontinued PPI and Ranitidine   · Will need follow-up with Dr Zane Reddy in [place]     Severe protein-calorie malnutrition [facility])  Assessment & Plan  Malnutrition Findings:   Malnutrition type: Chronic illness  Degree of Malnutrition: Other severe protein calorie malnutrition   Patient having decreased p o  Intake related to acute pancreatitis however patient is overall obese  BMI Findings:  BMI Classifications: Morbid Obesity 40-44 9     Body mass index is 40 29 kg/m²  Diabetes mellitus without complication [facility])  Assessment & Plan  Lab Results   Component Value Date    HGBA1C 6 0 09/05/2019       No results for input(s): POCGLU in the last 72 hours  Blood Sugar Average: Last 72 hrs:  · A1c 6 9  · Currently diet controlled  Patient not on medication    · Will hold off on insulin and glucose checks for now    Prolonged Q-T interval on ECG  Assessment & Plan  · Demonstrated on previous EKG, but appears resolved on latest EKG  · Exercise caution with QT prolonging medications    Essential hypertension  Assessment & Plan  · Continue atenolol     Opiate dependence, continuous (HCC)  Assessment & Plan  · Continue daily methadone  · Received Methadone from [place] [address]  ·  on cessation; Patient refused nicotine patch    Pancreatic cyst  Assessment & Plan  As noted on previous imaging  He has had recurrent cyst in the past that have required drainage  Now there appears to be new collection per CT imaging  · GI following - EUS outpatient, trying to coordinate this with Dr Tami Clayton, advanced endoscopist at [facility], to evaluate for further drainage   · See plan for pancreatitis    Morbid obesity with BMI of 40 0-44 9, adult [facility])  Assessment & Plan  ·  on weight loss, consider nutrition consult      Discharging Physician / Practitioner: GALO Davis  PCP: Da Bui DO  Admission Date:   Admission Orders (From admission, onward)     Ordered        11/12/19 1008  Inpatient Admission  Once         11/11/19 0723  Place in Observation  Once                   Discharge Date: 11/14/19    Resolved Problems  Date Reviewed: 11/13/2019    None          Consultations During Hospital Stay:  · GI    Procedures Performed:   · CT of abdomen and pelvis    Significant Findings / Test Results:   Findings suggestive of mild acute or subacute pancreatitis  New 4 5 x 2 5 x 3 cm fluid collection anterior to the proximal jejunum immediately distal to the ligament of Treitz attributed to complex pancreatic pseudocyst     New additional potentially communicating smaller collections in the left upper abdomen, the larger of which measures 3 4 x 2 3 x 2 2 cm anterior to the proximal descending colon   The smaller collection immediately superolateral to the dominant   collection adjacent to the proximal jejunum measures 1 4 x 1 x 1 4 cm also attributed to complex pancreatic pseudocysts      Interval decreased size of proximal gastric intramural fluid collection currently approximately 1 4 x 0 8 x 0 8 cm and previously 2 8 x 2 x 2 4 cm  Incidental Findings:   · None     Test Results Pending at Discharge (will require follow up): · IgG 1, 2, 3, and 4     Outpatient Tests Requested:  · EUS with Dr Vero Sapp    Complications:  None    Reason for Admission:  abdominal pain    Hospital Course:     Opal Puri is a 37 y o  male patient who originally presented to the hospital on 11/11/2019 due to his abdominal pain  Patient continued to have nausea , he was on bowel rest IV fluids he was found have acute pancreatitis  This is recurrent acute pancreatitis for this patient  He has a history of pancreatic pseudocyst   He will require follow-up with Dr Vero Sapp in [facility] for EUS  The patient has been tolerating surgical soft diet  He continues to complain of pain however patient was woken from sleep for exam and does appear comfortable  She denies any chest pain chest tightness shortness of breath or difficulty breathing    Please see above list of diagnoses and related plan for additional information  Condition at Discharge: stable     Discharge Day Visit / Exam:     Subjective:  Denies any chest pain chest tightness shortness of breath or difficulty breathing  He is tolerating his diet, reports some mild transient nausea  Reports that his pain is controlled  Vitals: Blood Pressure: 164/92 (11/14/19 0700)  Pulse: 64 (11/14/19 0700)  Temperature: 98 4 °F (36 9 °C) (11/14/19 0700)  Temp Source: Oral (11/14/19 0700)  Respirations: 20 (11/14/19 0700)  Height: 5' 8" (172 7 cm) (11/12/19 1337)  Weight - Scale: 120 kg (265 lb) (11/12/19 1336)  SpO2: 95 % (11/14/19 0700)  Exam:   Physical Exam   Constitutional: He appears well-developed and well-nourished  HENT:   Head: Normocephalic  Eyes: Pupils are equal, round, and reactive to light  Neck: Normal range of motion  Neck supple  Cardiovascular: Normal rate     Pulmonary/Chest: Effort normal and breath sounds normal    Abdominal: Soft  Bowel sounds are normal  He exhibits no distension  There is tenderness  Musculoskeletal: Normal range of motion  Neurological: He is alert  Skin: Skin is warm and dry  There is pallor  Psychiatric: He has a normal mood and affect  His behavior is normal  Judgment and thought content normal    Nursing note and vitals reviewed  Discussion with Family:  Not available at this time    Discharge instructions/Information to patient and family:   See after visit summary for information provided to patient and family  Provisions for Follow-Up Care:  See after visit summary for information related to follow-up care and any pertinent home health orders  Disposition:     Home    For Discharges to [facility] SNF:   · Not Applicable to this Patient - Not Applicable to this Patient    Planned Readmission: no     Discharge Statement:  I spent 30 minutes discharging the patient  This time was spent on the day of discharge  I had direct contact with the patient on the day of discharge  Greater than 50% of the total time was spent examining patient, answering all patient questions, arranging and discussing plan of care with patient as well as directly providing post-discharge instructions  Additional time then spent on discharge activities  Discharge Medications:  See after visit summary for reconciled discharge medications provided to patient and family        ** Please Note: This note has been constructed using a voice recognition system **

## 2019-11-15 NOTE — UTILIZATION REVIEW
Notification of Discharge  This is a Notification of Discharge from our facility [address]  Please be advised that this patient has been discharge from our facility  Below you will find the admission and discharge date and time including the patients disposition  PRESENTATION DATE: 11/11/2019  4:50 AM  OBS ADMISSION DATE: 11/11/2019    IP ADMISSION DATE: 11/12/19 1008   DISCHARGE DATE: 11/14/2019  4:00 PM  DISPOSITION: Home/Self Care Home/Self Care   Admission Orders listed below:  Admission Orders (From admission, onward)     Ordered        11/12/19 1008  Inpatient Admission  Once         11/11/19 0723  Place in Observation  Once                   Please contact the UR Department if additional information is required to close this patient's authorization/case  [address] [facility] Utilization Review Department  Main: [phone] x carefully listen to the prompts  All voicemails are confidential   [email or web address]  Send all requests for admission clinical reviews, approved or denied determinations and any other requests to dedicated fax number below belonging to the campus where the patient is receiving treatment    List of dedicated fax numbers:  [address] DENIALS (Administrative/Medical Necessity) [phone]   1000  [facility] (Maternity/NICU/Pediatrics) [phone]   Hansel Courser [phone]   Cristopher Sole [phone]   Marko Grit [phone]   [facility] [address] [facility] [phone]   [facility] 2000 [facility] [address] [phone]

## 2019-11-21 LAB
ATRIAL RATE: 58 BPM
P AXIS: 18 DEGREES
PR INTERVAL: 156 MS
QRS AXIS: -3 DEGREES
QRSD INTERVAL: 92 MS
QT INTERVAL: 460 MS
QTC INTERVAL: 451 MS
T WAVE AXIS: 14 DEGREES
VENTRICULAR RATE: 58 BPM

## 2019-11-21 PROCEDURE — 93010 ELECTROCARDIOGRAM REPORT: CPT | Performed by: INTERNAL MEDICINE

## 2019-11-22 ENCOUNTER — TELEPHONE (OUTPATIENT)
Dept: OTHER | Facility: HOSPITAL | Age: 39
End: 2019-11-22

## 2019-11-26 ENCOUNTER — OFFICE VISIT (OUTPATIENT)
Dept: GASTROENTEROLOGY | Facility: CLINIC | Age: 39
End: 2019-11-26
Payer: COMMERCIAL

## 2019-11-26 VITALS
WEIGHT: 270 LBS | BODY MASS INDEX: 40.92 KG/M2 | TEMPERATURE: 98.2 F | SYSTOLIC BLOOD PRESSURE: 123 MMHG | HEIGHT: 68 IN | DIASTOLIC BLOOD PRESSURE: 83 MMHG | HEART RATE: 68 BPM

## 2019-11-26 DIAGNOSIS — K85.90 RECURRENT PANCREATITIS: ICD-10-CM

## 2019-11-26 DIAGNOSIS — K86.3 PSEUDOCYST OF PANCREAS: Primary | ICD-10-CM

## 2019-11-26 PROCEDURE — 99214 OFFICE O/P EST MOD 30 MIN: CPT | Performed by: INTERNAL MEDICINE

## 2019-11-26 RX ORDER — OXYCODONE HYDROCHLORIDE 5 MG/1
5 TABLET ORAL
COMMUNITY
Start: 2019-11-21

## 2019-11-26 RX ORDER — PREGABALIN 150 MG/1
150 CAPSULE ORAL
COMMUNITY
Start: 2019-11-21

## 2019-11-26 NOTE — PROGRESS NOTES
[facility] - Outpatient Follow-up Note  Doug Romero 37 y o  male MRN: [id number]  Encounter: [id number]          ASSESSMENT AND PLAN:      1  Pseudocyst of pancreas  2  Recurrent pancreatitis    We will proceed with MRI to further evaluate the status of his cysts  If they remain enlarged and potentially are causing his current symptomology we will consider repeat EUS guided drainage vs  Surgical referral   - MRI abdomen w wo contrast and mrcp; Future    Will also start patient on CREON pancreatic enzyme replacement for his pain  He should take 2-3 pills per each Ensure/liquid meal he has  - pancrelipase, Lip-Prot-Amyl, (CREON) 24,000 units; Take 24,000 units of lipase by mouth 3 (three) times a day with meals  Dispense: 90 capsule; Refill: 2      ______________________________________________________________________    SUBJECTIVE:  Patient still with pain and discomfort  Unable to tolerate a solid diet  Patient is having pain in his mid abdomen, rates at 8/10 severity  He is unable to take in any solid food and has been taking Ensure and liquid diet  He did have a CT scan done 2 weeks ago which revealed new fluid collections  His previously drained fluid collections had decreased in size  REVIEW OF SYSTEMS IS OTHERWISE NEGATIVE        Historical Information   Past Medical History:   Diagnosis Date    Back pain     Cardiac disease     Chronic pain disorder     GERD (gastroesophageal reflux disease)     Hypertension     Myocardial infarction ([address] )     Neck pain      Past Surgical History:   Procedure Laterality Date    BACK SURGERY      HERNIA REPAIR      HERNIA REPAIR  2018    KNEE SURGERY      NECK SURGERY  2005     Social History   Social History     Substance and Sexual Activity   Alcohol Use Not Currently    Frequency: Never     Social History     Substance and Sexual Activity   Drug Use Never     Social History     Tobacco Use   Smoking Status Current Every Day Smoker    Packs/day: 0 25   Smokeless Tobacco Never Used     Family History   Problem Relation Age of Onset    Diabetes Mother     Heart disease Mother     Cancer Father     No Known Problems Brother     Aneurysm Maternal Grandmother     No Known Problems Maternal Grandfather     No Known Problems Paternal Grandmother     No Known Problems Paternal Grandfather        Meds/Allergies       Current Outpatient Medications:     atenolol (TENORMIN) 100 mg tablet    dicyclomine (BENTYL) 20 mg tablet    methadone (DOLOPHINE) 10 mg/mL oral concentrated solution    metoclopramide (REGLAN) 5 mg tablet    ondansetron (ZOFRAN-ODT) 4 mg disintegrating tablet    oxyCODONE (ROXICODONE) 5 mg immediate release tablet    pregabalin (LYRICA) 150 mg capsule    prochlorperazine (COMPAZINE) 25 mg suppository    promethazine (PHENERGAN) 25 mg tablet    sucralfate (CARAFATE) 1 g/10 mL suspension    pregabalin (LYRICA) 100 mg capsule    Allergies   Allergen Reactions    Iodinated Diagnostic Agents     Ketorolac Tromethamine      Other reaction(s): Other (Please comment)  Pt goes into shock if given    Penicillins Hives     Other reaction(s): Other (Please comment)  Trouble breathing    Shellfish Allergy     Varenicline Hives           Objective     Blood pressure 123/83, pulse 68, temperature 98 2 °F (36 8 °C), temperature source Tympanic, height 5' 8" (1 727 m), weight 122 kg (270 lb)  Body mass index is 41 05 kg/m²  PHYSICAL EXAM:      General Appearance:   Alert, cooperative, no distress   HEENT:   Normocephalic, atraumatic, anicteric  Lungs:   Clear to auscultation bilaterally; no rales, rhonchi or wheezing; respirations unlabored    Heart[de-identified]   Regular rate and rhythm; no murmur, rub, or gallop     Abdomen:   Soft, non-tender, non-distended; normal bowel sounds; no masses, no organomegaly    Genitalia:   Deferred    Rectal:   Deferred    Extremities:  No cyanosis, clubbing or edema    Pulses: 2+ and symmetric    Skin:  No jaundice, rashes, or lesions          Lab Results:   No visits with results within 1 Day(s) from this visit     Latest known visit with results is:   Admission on 11/11/2019, Discharged on 11/14/2019   Component Date Value    WBC 11/11/2019 10 63*    RBC 11/11/2019 5 29     Hemoglobin 11/11/2019 13 6     Hematocrit 11/11/2019 43 6     MCV 11/11/2019 82     MCH 11/11/2019 25 7*    MCHC 11/11/2019 31 2*    RDW 11/11/2019 15 3*    MPV 11/11/2019 10 3     Platelets 59/71/4923 303     nRBC 11/11/2019 0     Neutrophils Relative 11/11/2019 80*    Immat GRANS % 11/11/2019 1     Lymphocytes Relative 11/11/2019 14     Monocytes Relative 11/11/2019 4     Eosinophils Relative 11/11/2019 1     Basophils Relative 11/11/2019 0     Neutrophils Absolute 11/11/2019 8 61*    Immature Grans Absolute 11/11/2019 0 05     Lymphocytes Absolute 11/11/2019 1 49     Monocytes Absolute 11/11/2019 0 41     Eosinophils Absolute 11/11/2019 0 06     Basophils Absolute 11/11/2019 0 01     Sodium 11/11/2019 139     Potassium 11/11/2019 3 9     Chloride 11/11/2019 100     CO2 11/11/2019 25     ANION GAP 11/11/2019 14*    BUN 11/11/2019 6     Creatinine 11/11/2019 0 67     Glucose 11/11/2019 119     Calcium 11/11/2019 9 5     AST 11/11/2019 16     ALT 11/11/2019 13     Alkaline Phosphatase 11/11/2019 131*    Total Protein 11/11/2019 7 7     Albumin 11/11/2019 3 1*    Total Bilirubin 11/11/2019 0 40     eGFR 11/11/2019 119     Lipase 11/11/2019 507*    Color, UA 11/11/2019 Yellow     Clarity, UA 11/11/2019 Clear     Specific Bureau, UA 11/11/2019 1 015     pH, UA 11/11/2019 8 5*    Leukocytes, UA 11/11/2019 Negative     Nitrite, UA 11/11/2019 Negative     Protein, UA 11/11/2019 Negative     Glucose, UA 11/11/2019 Negative     Ketones, UA 11/11/2019 >=80 (3+)*    Urobilinogen, UA 11/11/2019 1 0     Bilirubin, UA 11/11/2019 Small*    Blood, UA 11/11/2019 Negative     LACTIC ACID 11/11/2019 1 3     Platelets 75/41/9833 287     MPV 11/11/2019 10 3     Triglycerides 11/11/2019 132     WBC 11/12/2019 7 75     RBC 11/12/2019 4 93     Hemoglobin 11/12/2019 12 8     Hematocrit 11/12/2019 42 2     MCV 11/12/2019 86     MCH 11/12/2019 26 0*    MCHC 11/12/2019 30 3*    RDW 11/12/2019 15 5*    Platelets 61/55/6615 143*    MPV 11/12/2019 10 7     Sodium 11/12/2019 140     Potassium 11/12/2019 4 0     Chloride 11/12/2019 101     CO2 11/12/2019 28     ANION GAP 11/12/2019 11     BUN 11/12/2019 4*    Creatinine 11/12/2019 0 70     Glucose 11/12/2019 78     Calcium 11/12/2019 8 7     AST 11/12/2019 12     ALT 11/12/2019 10*    Alkaline Phosphatase 11/12/2019 104     Total Protein 11/12/2019 7 0     Albumin 11/12/2019 2 9*    Total Bilirubin 11/12/2019 0 30     eGFR 11/12/2019 117     Magnesium 11/12/2019 1 8     Phosphorus 11/12/2019 3 3     Lipase 11/12/2019 685*    IgG 1 11/12/2019 529     IgG 2 11/12/2019 176     IgG 3 11/12/2019 52     IgG 4 11/12/2019 69     IgG 11/12/2019 953     Sodium 11/13/2019 142     Potassium 11/13/2019 3 7     Chloride 11/13/2019 103     CO2 11/13/2019 30     ANION GAP 11/13/2019 9     BUN 11/13/2019 4*    Creatinine 11/13/2019 0 70     Glucose 11/13/2019 74     Calcium 11/13/2019 8 6     AST 11/13/2019 12     ALT 11/13/2019 10*    Alkaline Phosphatase 11/13/2019 96     Total Protein 11/13/2019 6 5     Albumin 11/13/2019 2 7*    Total Bilirubin 11/13/2019 0 20     eGFR 11/13/2019 117     WBC 11/13/2019 5 84     RBC 11/13/2019 4 58     Hemoglobin 11/13/2019 11 8*    Hematocrit 11/13/2019 39 5     MCV 11/13/2019 86     MCH 11/13/2019 25 8*    MCHC 11/13/2019 29 9*    RDW 11/13/2019 15 5*    MPV 11/13/2019 10 3     Platelets 08/02/5087 110*    nRBC 11/13/2019 0     Neutrophils Relative 11/13/2019 56     Immat GRANS % 11/13/2019 0     Lymphocytes Relative 11/13/2019 34     Monocytes Relative 11/13/2019 7     Eosinophils Relative 11/13/2019 3     Basophils Relative 11/13/2019 0     Neutrophils Absolute 11/13/2019 3 25     Immature Grans Absolute 11/13/2019 0 02     Lymphocytes Absolute 11/13/2019 1 96     Monocytes Absolute 11/13/2019 0 43     Eosinophils Absolute 11/13/2019 0 17     Basophils Absolute 11/13/2019 0 01     Magnesium 11/13/2019 1 7     Phosphorus 11/13/2019 4 0     Lipase 11/13/2019 549*    Amylase 11/13/2019 40     Ventricular Rate 11/13/2019 58     Atrial Rate 11/13/2019 58     NM Interval 11/13/2019 156     QRSD Interval 11/13/2019 92     QT Interval 11/13/2019 460     QTC Interval 11/13/2019 451     P Axis 11/13/2019 18     QRS Axis 11/13/2019 -3     T Wave Axis 11/13/2019 14     Ventricular Rate 11/13/2019 58     Atrial Rate 11/13/2019 58     NM Interval 11/13/2019 156     QRSD Interval 11/13/2019 92     QT Interval 11/13/2019 460     QTC Interval 11/13/2019 451     P Axis 11/13/2019 18     QRS Axis 11/13/2019 -3     T Wave Mustang 11/13/2019 14          Radiology Results:   Ct Abdomen Pelvis Wo Contrast    Result Date: 11/11/2019  Narrative: CT ABDOMEN AND PELVIS WITHOUT IV CONTRAST INDICATION:   Abdominal pain, acute, nonlocalized  COMPARISON:  CT abdomen and pelvis 10/8/2019 and MRI abdomen 9/5/2019 TECHNIQUE:  CT examination of the abdomen and pelvis was performed without intravenous contrast   Axial, sagittal, and coronal 2D reformatted images were created from the source data and submitted for interpretation  Radiation dose length product (DLP) for this visit:  76 310 744 mGy-cm   This examination, like all CT scans performed in the [facility], was performed utilizing techniques to minimize radiation dose exposure, including the use of iterative reconstruction and automated exposure control  Enteric contrast was administered  FINDINGS: ABDOMEN LOWER CHEST:  Dependent hypoventilatory changes   LIVER/BILIARY TREE:  Liver is diffusely decreased in density consistent with fatty change  No CT evidence of suspicious hepatic mass  Normal hepatic contours  No biliary dilatation  GALLBLADDER:  No calcified gallstones  No pericholecystic inflammatory change  SPLEEN:  Unremarkable  PANCREAS:  Trace stranding adjacent to the distal body  Otherwise stable moderate fatty infiltration of the head and uncinate process and complete fatty replacement of the distal tail of the pancreas versus hypoplasia  ADRENAL GLANDS:  Stable 2 cm left adrenal low-density nodule characterized as adrenal cyst on the prior MRI  Unremarkable right adrenal gland  KIDNEYS/URETERS:  Unremarkable  No hydronephrosis  STOMACH AND BOWEL:  Mild thickening of the proximal jejunum immediately distal to the ligament of Treitz with mild adjacent stranding  4 No bowel obstruction  Mild nonspecific thickening of the gastric body  Focal low density in the proximal wall of the lesser curve measures 1 4 x 0 8 x 0 8 cm image 28 series 2 and image 54 series 601 previously 2 8 x 2 x 2 4 cm  APPENDIX:  A normal appendix was visualized  ABDOMINOPELVIC CAVITY:  4 3 x 2 5 x 3 cm loculated fluid density immediately adjacent to the anterior wall of the proximal jejunum image 36 series 2 and image 67 series 601 is new since October 8, 2019  1 4 x 1 x 1 4 cm new low-density lesion immediately superolateral to the proximal jejunum and above-described fluid collection 32 series 2 and image 72 series 601  New lobulated low density immediately anterior to the proximal descending colon measures 3 4 x 2 3 x 2 2 cm image 75 series 601 and image 27 series 2 with potentially communicating tail between this collection and the smaller collection described above images 29 through 32 series 2 and image 71 and 72 series 601  No free gas or enlarged lymph nodes  VESSELS:  Trace calcification bilateral common iliac arteries  No abdominal aortic aneurysm  Gastrohepatic and upper abdominal collateral vessels   PELVIS REPRODUCTIVE ORGANS:  Unremarkable for patient's age  URINARY BLADDER:  Unremarkable  ABDOMINAL WALL/INGUINAL REGIONS: Subcentimeter ventral right periumbilical abdominal wall diastases containing fat  No bowel herniation  Tiny bilateral fat-containing inguinal hernias  No inguinal mass  OSSEOUS STRUCTURES:  No acute fracture or osseous destructive lesion identified  Degenerative changes of the spine, pubic symphysis, and multiple joints  Stable grade 1 retrolisthesis of L3 on L4  Posterior decompression at L4  Impression: Findings suggestive of mild acute or subacute pancreatitis  New 4 5 x 2 5 x 3 cm fluid collection anterior to the proximal jejunum immediately distal to the ligament of Treitz attributed to complex pancreatic pseudocyst  New additional potentially communicating smaller collections in the left upper abdomen, the larger of which measures 3 4 x 2 3 x 2 2 cm anterior to the proximal descending colon  The smaller collection immediately superolateral to the dominant collection adjacent to the proximal jejunum measures 1 4 x 1 x 1 4 cm also attributed to complex pancreatic pseudocysts  Interval decreased size of proximal gastric intramural fluid collection currently approximately 1 4 x 0 8 x 0 8 cm and previously 2 8 x 2 x 2 4 cm  The study was marked in [facility] for immediate notification   Workstation performed: [id number]

## 2019-11-26 NOTE — PATIENT INSTRUCTIONS
MRI scheduled at [facility] on 12/13/2019  Britt Manzo aware of prior authorization

## 2019-12-05 ENCOUNTER — TELEPHONE (OUTPATIENT)
Dept: GASTROENTEROLOGY | Facility: CLINIC | Age: 39
End: 2019-12-05

## 2019-12-05 NOTE — TELEPHONE ENCOUNTER
Called patients insurance spoke to [place] No prior auth needed for imaging study ordered by Dr Franky Perry

## 2019-12-13 ENCOUNTER — HOSPITAL ENCOUNTER (OUTPATIENT)
Dept: MRI IMAGING | Facility: HOSPITAL | Age: 39
Discharge: HOME | End: 2019-12-13
Attending: INTERNAL MEDICINE
Payer: COMMERCIAL

## 2019-12-13 DIAGNOSIS — K86.3 PSEUDOCYST OF PANCREAS: ICD-10-CM

## 2019-12-13 DIAGNOSIS — K85.90 RECURRENT PANCREATITIS: ICD-10-CM

## 2019-12-13 PROCEDURE — 74181 MRI ABDOMEN W/O CONTRAST: CPT

## 2019-12-27 ENCOUNTER — OFFICE VISIT (OUTPATIENT)
Dept: GASTROENTEROLOGY | Facility: CLINIC | Age: 39
End: 2019-12-27
Payer: COMMERCIAL

## 2019-12-27 VITALS
SYSTOLIC BLOOD PRESSURE: 138 MMHG | DIASTOLIC BLOOD PRESSURE: 87 MMHG | HEART RATE: 56 BPM | HEIGHT: 68 IN | BODY MASS INDEX: 40.65 KG/M2 | TEMPERATURE: 97.7 F | WEIGHT: 268.2 LBS

## 2019-12-27 DIAGNOSIS — K85.90 RECURRENT PANCREATITIS: ICD-10-CM

## 2019-12-27 DIAGNOSIS — K86.3 PANCREATIC PSEUDOCYST: Primary | ICD-10-CM

## 2019-12-27 PROCEDURE — 99214 OFFICE O/P EST MOD 30 MIN: CPT | Performed by: INTERNAL MEDICINE

## 2019-12-27 NOTE — PROGRESS NOTES
[facility] - Outpatient Follow-up Note  Conchita Maxwell 37 y o  male MRN: [id number]  Encounter: [id number]          ASSESSMENT AND PLAN:    1  Pancreatic pseudocyst  2  Recurrent pancreatitis    Will repeat CT scan to evaluate for remainder of pseudocyst    - pancrelipase, Lip-Prot-Amyl, (PANCREAZE) 76420 units CPEP capsule; Take 2 capsules (42,000 units of lipase total) by mouth 3 (three) times a day with meals  Dispense: 90 capsule; Refill: 3  - CT abdomen pelvis w contrast; Future      ______________________________________________________________________    SUBJECTIVE: Patient not having any abdominal pain at this time  Feels well  No abdominal distension or early satiety  He had MRI on 12/13 which revealed resolution of previous pseudocyst and improvement of the inflammatory changes as well  I have personally viewed the images in PACS  REVIEW OF SYSTEMS IS OTHERWISE NEGATIVE        Historical Information   Past Medical History:   Diagnosis Date    Back pain     Cardiac disease     Chronic pain disorder     GERD (gastroesophageal reflux disease)     Hypertension     Myocardial infarction ([address] )     Neck pain      Past Surgical History:   Procedure Laterality Date    BACK SURGERY      COLONOSCOPY      HERNIA REPAIR      HERNIA REPAIR  2018    KNEE SURGERY      NECK SURGERY  2005    UPPER GASTROINTESTINAL ENDOSCOPY       Social History   Social History     Substance and Sexual Activity   Alcohol Use Not Currently    Frequency: Never     Social History     Substance and Sexual Activity   Drug Use Never     Social History     Tobacco Use   Smoking Status Current Every Day Smoker    Packs/day: 0 25   Smokeless Tobacco Never Used     Family History   Problem Relation Age of Onset    Diabetes Mother     Heart disease Mother     Cancer Father     No Known Problems Brother     Aneurysm Maternal Grandmother     No Known Problems Maternal Grandfather     No Known Problems Paternal Grandmother     No Known Problems Paternal Grandfather        Meds/Allergies       Current Outpatient Medications:     atenolol (TENORMIN) 100 mg tablet    dicyclomine (BENTYL) 20 mg tablet    methadone (DOLOPHINE) 10 mg/mL oral concentrated solution    ondansetron (ZOFRAN-ODT) 4 mg disintegrating tablet    oxyCODONE (ROXICODONE) 5 mg immediate release tablet    pancrelipase, Lip-Prot-Amyl, (CREON) 24,000 units    pregabalin (LYRICA) 100 mg capsule    pregabalin (LYRICA) 150 mg capsule    prochlorperazine (COMPAZINE) 25 mg suppository    promethazine (PHENERGAN) 25 mg tablet    sucralfate (CARAFATE) 1 g/10 mL suspension    Allergies   Allergen Reactions    Iodinated Diagnostic Agents     Ketorolac Tromethamine      Other reaction(s): Other (Please comment)  Pt goes into shock if given    Penicillins Hives     Other reaction(s): Other (Please comment)  Trouble breathing    Shellfish Allergy     Varenicline Hives           Objective     Blood pressure 138/87, pulse 56, temperature 97 7 °F (36 5 °C), temperature source Tympanic, height 5' 8" (1 727 m), weight 122 kg (268 lb 3 2 oz)  Body mass index is 40 78 kg/m²  PHYSICAL EXAM:      General Appearance:   Alert, cooperative, no distress   HEENT:   Normocephalic, atraumatic, anicteric  Lungs:   Clear to auscultation bilaterally; no rales, rhonchi or wheezing; respirations unlabored    Heart[de-identified]   Regular rate and rhythm; no murmur, rub, or gallop  Abdomen:   Soft, non-tender, non-distended; normal bowel sounds; no masses, no organomegaly    Genitalia:   Deferred    Rectal:   Deferred    Extremities:  No cyanosis, clubbing or edema    Pulses:  2+ and symmetric    Skin:  No jaundice, rashes, or lesions          Lab Results:   No visits with results within 1 Day(s) from this visit     Latest known visit with results is:   Admission on 11/11/2019, Discharged on 11/14/2019   Component Date Value    WBC 11/11/2019 10 63*    RBC 11/11/2019 5 29     Hemoglobin 11/11/2019 13 6     Hematocrit 11/11/2019 43 6     MCV 11/11/2019 82     MCH 11/11/2019 25 7*    MCHC 11/11/2019 31 2*    RDW 11/11/2019 15 3*    MPV 11/11/2019 10 3     Platelets 76/26/0675 303     nRBC 11/11/2019 0     Neutrophils Relative 11/11/2019 80*    Immat GRANS % 11/11/2019 1     Lymphocytes Relative 11/11/2019 14     Monocytes Relative 11/11/2019 4     Eosinophils Relative 11/11/2019 1     Basophils Relative 11/11/2019 0     Neutrophils Absolute 11/11/2019 8 61*    Immature Grans Absolute 11/11/2019 0 05     Lymphocytes Absolute 11/11/2019 1 49     Monocytes Absolute 11/11/2019 0 41     Eosinophils Absolute 11/11/2019 0 06     Basophils Absolute 11/11/2019 0 01     Sodium 11/11/2019 139     Potassium 11/11/2019 3 9     Chloride 11/11/2019 100     CO2 11/11/2019 25     ANION GAP 11/11/2019 14*    BUN 11/11/2019 6     Creatinine 11/11/2019 0 67     Glucose 11/11/2019 119     Calcium 11/11/2019 9 5     AST 11/11/2019 16     ALT 11/11/2019 13     Alkaline Phosphatase 11/11/2019 131*    Total Protein 11/11/2019 7 7     Albumin 11/11/2019 3 1*    Total Bilirubin 11/11/2019 0 40     eGFR 11/11/2019 119     Lipase 11/11/2019 507*    Color, UA 11/11/2019 Yellow     Clarity, UA 11/11/2019 Clear     Specific [place], UA 11/11/2019 1 015     pH, UA 11/11/2019 8 5*    Leukocytes, UA 11/11/2019 Negative     Nitrite, UA 11/11/2019 Negative     Protein, UA 11/11/2019 Negative     Glucose, UA 11/11/2019 Negative     Ketones, UA 11/11/2019 >=80 (3+)*    Urobilinogen, UA 11/11/2019 1 0     Bilirubin, UA 11/11/2019 Small*    Blood, UA 11/11/2019 Negative     LACTIC ACID 11/11/2019 1 3     Platelets 47/10/6135 287     MPV 11/11/2019 10 3     Triglycerides 11/11/2019 132     WBC 11/12/2019 7 75     RBC 11/12/2019 4 93     Hemoglobin 11/12/2019 12 8     Hematocrit 11/12/2019 42 2     MCV 11/12/2019 86     MCH 11/12/2019 26 0*    MCHC 11/12/2019 30 3*    RDW 11/12/2019 15 5*    Platelets 05/90/3759 143*    MPV 11/12/2019 10 7     Sodium 11/12/2019 140     Potassium 11/12/2019 4 0     Chloride 11/12/2019 101     CO2 11/12/2019 28     ANION GAP 11/12/2019 11     BUN 11/12/2019 4*    Creatinine 11/12/2019 0 70     Glucose 11/12/2019 78     Calcium 11/12/2019 8 7     AST 11/12/2019 12     ALT 11/12/2019 10*    Alkaline Phosphatase 11/12/2019 104     Total Protein 11/12/2019 7 0     Albumin 11/12/2019 2 9*    Total Bilirubin 11/12/2019 0 30     eGFR 11/12/2019 117     Magnesium 11/12/2019 1 8     Phosphorus 11/12/2019 3 3     Lipase 11/12/2019 685*    IgG 1 11/12/2019 529     IgG 2 11/12/2019 176     IgG 3 11/12/2019 52     IgG 4 11/12/2019 69     IgG 11/12/2019 953     Sodium 11/13/2019 142     Potassium 11/13/2019 3 7     Chloride 11/13/2019 103     CO2 11/13/2019 30     ANION GAP 11/13/2019 9     BUN 11/13/2019 4*    Creatinine 11/13/2019 0 70     Glucose 11/13/2019 74     Calcium 11/13/2019 8 6     AST 11/13/2019 12     ALT 11/13/2019 10*    Alkaline Phosphatase 11/13/2019 96     Total Protein 11/13/2019 6 5     Albumin 11/13/2019 2 7*    Total Bilirubin 11/13/2019 0 20     eGFR 11/13/2019 117     WBC 11/13/2019 5 84     RBC 11/13/2019 4 58     Hemoglobin 11/13/2019 11 8*    Hematocrit 11/13/2019 39 5     MCV 11/13/2019 86     MCH 11/13/2019 25 8*    MCHC 11/13/2019 29 9*    RDW 11/13/2019 15 5*    MPV 11/13/2019 10 3     Platelets 37/03/6520 110*    nRBC 11/13/2019 0     Neutrophils Relative 11/13/2019 56     Immat GRANS % 11/13/2019 0     Lymphocytes Relative 11/13/2019 34     Monocytes Relative 11/13/2019 7     Eosinophils Relative 11/13/2019 3     Basophils Relative 11/13/2019 0     Neutrophils Absolute 11/13/2019 3 25     Immature Grans Absolute 11/13/2019 0 02     Lymphocytes Absolute 11/13/2019 1 96     Monocytes Absolute 11/13/2019 0 43     Eosinophils Absolute 11/13/2019 0 17     Basophils Absolute 11/13/2019 0 01     Magnesium 11/13/2019 1 7     Phosphorus 11/13/2019 4 0     Lipase 11/13/2019 549*    Amylase 11/13/2019 40     Ventricular Rate 11/13/2019 58     Atrial Rate 11/13/2019 58     NC Interval 11/13/2019 156     QRSD Interval 11/13/2019 92     QT Interval 11/13/2019 460     QTC Interval 11/13/2019 451     P Axis 11/13/2019 18     QRS Axis 11/13/2019 -3     T Wave Axis 11/13/2019 14     Ventricular Rate 11/13/2019 58     Atrial Rate 11/13/2019 58     NC Interval 11/13/2019 156     QRSD Interval 11/13/2019 92     QT Interval 11/13/2019 460     QTC Interval 11/13/2019 451     P Axis 11/13/2019 18     QRS Axis 11/13/2019 -3     T Wave [place] 11/13/2019 14          Radiology Results:   Mri Abdomen Wo Contrast And Mrcp    Result Date: 12/19/2019  Narrative: MRI OF THE ABDOMEN WITHOUT CONTRAST WITH MRCP INDICATION: Follow-up pancreatic cyst COMPARISON: 9/5/2019, 11/11/2019 TECHNIQUE: TECHNIQUE:  MRI of the abdomen was performed without the administration of contrast using the following  using sequences: Axial T1 weighted in/out of phase images, multiplanar T2 weighted images, diffusion weighted and fat suppressed T1 weighted images  3D MRCP images were obtained with radial thick slabs and projections  3D rendering was performed from the acquisition scanner  FINDINGS: LOWER CHEST:   Unremarkable  [place] LIVER: Severe hepatic steatosis  No suspicious mass  BILE DUCTS: No intrahepatic or extrahepatic bile duct dilation  Common bile duct is normal in caliber  No choledocholithiasis, biliary stricture or suspicious mass  GALLBLADDER:  Normal  PANCREAS: The previously seen pancreatic cyst has resolved and was likely related to acute pancreatitis  There is a nonvisualized segment of the main pancreatic duct with normal caliber distally to the ampulla  The duct proximal to the nonvisualized portion is also normal in caliber though appears irregular and beaded   ADRENAL GLANDS:  Again seen is a left adrenal cyst  SPLEEN:  Normal  KIDNEYS/PROXIMAL URETERS: No hydroureteronephrosis  No suspicious renal mass  BOWEL:   No dilated loops of bowel  The fluid collection adjacent to the jejunum on the prior CT and the fluid collection in the gastric wall on prior MRI are no longer visualized in keeping with prior pancreatitis  PERITONEAL CAVITY/RETROPERITONEUM: Inflammatory changes in the left upper quadrant secondary to pancreatitis as seen on the prior CT have improved  No mass  LYMPH NODES: No abdominal lymphadenopathy  VASCULAR STRUCTURES:  Unremarkable  No aneurysm  ABDOMINAL WALL:  Unremarkable  OSSEOUS STRUCTURES:  No suspicious osseous lesion  Impression: 1  Resolution of previous pancreatic cyst consistent with prior acute pancreatitis  Left upper quadrant inflammatory changes have improved  2   Segmental nonvisualization of the main pancreatic duct with upstream irregularity/bleeding  This is likely related to prior pancreatitis and is suspicious for changes of early chronic pancreatitis  3   Additional cystic foci in the stomach and jejunum are also resolved consistent with prior acute pancreatitis  4   Diffuse hepatic steatosis   Workstation performed: [id number]

## 2019-12-30 ENCOUNTER — TELEPHONE (OUTPATIENT)
Dept: GASTROENTEROLOGY | Facility: AMBULARY SURGERY CENTER | Age: 39
End: 2019-12-30

## 2019-12-30 NOTE — TELEPHONE ENCOUNTER
Patients GI provider:  Dr Meño Clayton    Number to return call: [phone]    Reason for call: [facility] called requesting a new order for ct without contrast due to pt condition or pt will need prep         Scheduled procedure/appointment date if applicable: Appt 66/47/89

## 2019-12-31 ENCOUNTER — TELEPHONE (OUTPATIENT)
Dept: INTERVENTIONAL RADIOLOGY/VASCULAR | Facility: HOSPITAL | Age: 39
End: 2019-12-31

## 2019-12-31 DIAGNOSIS — Z88.8 ALLERGY TO IODINE: Primary | ICD-10-CM

## 2019-12-31 RX ORDER — DIPHENHYDRAMINE HCL 50 MG
CAPSULE ORAL
Qty: 1 CAPSULE | Refills: 0 | Status: SHIPPED | OUTPATIENT
Start: 2019-12-31

## 2019-12-31 RX ORDER — METHYLPREDNISOLONE 32 MG/1
TABLET ORAL
Qty: 2 TABLET | Refills: 0 | Status: SHIPPED | OUTPATIENT
Start: 2019-12-31

## 2019-12-31 NOTE — TELEPHONE ENCOUNTER
I called him and let him know, we discussed how to take the medications prior to the CT  Please let the radiology department at [facility] know that we will be doing the prep and leaving the order as-is  Patient states he will have a  because Benadryl makes him sleepy

## 2020-01-02 ENCOUNTER — TELEPHONE (OUTPATIENT)
Dept: INTERVENTIONAL RADIOLOGY/VASCULAR | Facility: HOSPITAL | Age: 40
End: 2020-01-02

## 2020-01-03 ENCOUNTER — TELEPHONE (OUTPATIENT)
Dept: GASTROENTEROLOGY | Facility: CLINIC | Age: 40
End: 2020-01-03

## 2020-01-03 DIAGNOSIS — K86.3 PANCREATIC PSEUDOCYST: Primary | ICD-10-CM

## 2020-01-03 DIAGNOSIS — K85.90 RECURRENT PANCREATITIS: ICD-10-CM

## 2020-01-08 ENCOUNTER — HOSPITAL ENCOUNTER (OUTPATIENT)
Dept: CT IMAGING | Facility: HOSPITAL | Age: 40
Discharge: HOME | End: 2020-01-08
Attending: INTERNAL MEDICINE
Payer: COMMERCIAL

## 2020-01-08 DIAGNOSIS — K85.90 RECURRENT PANCREATITIS: ICD-10-CM

## 2020-01-08 DIAGNOSIS — K86.3 PANCREATIC PSEUDOCYST: ICD-10-CM

## 2020-01-08 PROCEDURE — 74177 CT ABD & PELVIS W/CONTRAST: CPT

## 2020-01-08 RX ADMIN — IOHEXOL 100 ML: 350 INJECTION, SOLUTION INTRAVENOUS at 13:04

## 2020-01-10 NOTE — TELEPHONE ENCOUNTER
Checked status on prior auth pancrelipase ordered by Dr Montero Favor  Request is denied, denial letter in patients chart under media  I do have samples of Delmis Figueroa in the [place] office  Let me know,  Thank you!   Will

## 2020-01-13 NOTE — TELEPHONE ENCOUNTER
Mikel Will,    Did you submit the denial to his insurance company? This way they are aware and might allow me to do a peer to peer  In the meantime, patient can use samples  Thank you!

## 2020-01-16 NOTE — TELEPHONE ENCOUNTER
Sounds good Leno Lung thank you! In the letter, looking at it now, they said there formulary alternatives are Creon and Zenpep  The denial number is [id number]  The only samples that I have is of the Nimisha Haider will be in the clinic tomorrow I can always ask him?     Thanks again  Will

## 2020-01-16 NOTE — TELEPHONE ENCOUNTER
Hey Will,    I would ask Dr Jorge Ron if he would like to switch to Creon or Rolando Friend since these are covered by his insurance  If he still would like patient to have the pancreaze, I will do the peer to peer  Just let me know what Dr Jorge Ron decides, I don't mind doing the peer to peer if he would like to stick with the pancreaze  Thank you!

## 2020-01-17 NOTE — TELEPHONE ENCOUNTER
We can try the ZenPep since it is covered, if he has any issues and the creon is better tolerated, we can go back to the Creon      Nickolas Archer

## 2020-01-23 NOTE — TELEPHONE ENCOUNTER
ANA:    Dr Ligia Cruz patient hx pancreatitis, pancreatic pseudocyst    Patient calls with pain in the middle of his stomach rated an "8" since yesterday  Experiencing fevers as high as 102, sweating, nausea and has vomited a few times, only able to keep down water  Feels "exactly" the same as when he had a pancreatitis flare  Patient will have a family member drive him to the ED

## 2020-01-23 NOTE — TELEPHONE ENCOUNTER
I called patient and he demonstrated understanding  Vernadine Mayur, patient stated that he's starting to "flare up"he states that since last night he's developed a fever, vomiting and having severe abd pain  I advised him to go to ER  I also stated I would let our nursing team aware      Thank you,  Will

## 2020-02-20 ENCOUNTER — TRANSCRIBE ORDERS (OUTPATIENT)
Dept: ADMINISTRATIVE | Facility: HOSPITAL | Age: 40
End: 2020-02-20

## 2020-02-20 DIAGNOSIS — K85.90 ACUTE PANCREATITIS, UNSPECIFIED COMPLICATION STATUS, UNSPECIFIED PANCREATITIS TYPE: Primary | ICD-10-CM

## 2020-02-26 ENCOUNTER — HOSPITAL ENCOUNTER (INPATIENT)
Facility: HOSPITAL | Age: 40
LOS: 2 days | Discharge: HOME | DRG: 282 | End: 2020-02-28
Attending: EMERGENCY MEDICINE | Admitting: INTERNAL MEDICINE
Payer: COMMERCIAL

## 2020-02-26 ENCOUNTER — APPOINTMENT (EMERGENCY)
Dept: CT IMAGING | Facility: HOSPITAL | Age: 40
DRG: 282 | End: 2020-02-26
Payer: COMMERCIAL

## 2020-02-26 DIAGNOSIS — K85.90 ACUTE ON CHRONIC PANCREATITIS (HCC): Primary | ICD-10-CM

## 2020-02-26 DIAGNOSIS — K86.2 PANCREATIC CYST: ICD-10-CM

## 2020-02-26 DIAGNOSIS — K86.1 ACUTE ON CHRONIC PANCREATITIS (HCC): Primary | ICD-10-CM

## 2020-02-26 DIAGNOSIS — R40.4 TRANSIENT ALTERATION OF AWARENESS: ICD-10-CM

## 2020-02-26 DIAGNOSIS — K85.90 PANCREATITIS: ICD-10-CM

## 2020-02-26 PROBLEM — E11.9 DIABETES MELLITUS WITHOUT COMPLICATION (HCC): Chronic | Status: RESOLVED | Noted: 2019-06-15 | Resolved: 2020-02-26

## 2020-02-26 PROBLEM — E66.09 CLASS 2 OBESITY DUE TO EXCESS CALORIES IN ADULT: Chronic | Status: ACTIVE | Noted: 2020-02-26

## 2020-02-26 PROBLEM — E11.9 DIABETES MELLITUS WITHOUT COMPLICATION (HCC): Chronic | Status: ACTIVE | Noted: 2019-06-15

## 2020-02-26 PROBLEM — Z72.0 TOBACCO USE: Chronic | Status: ACTIVE | Noted: 2019-09-04

## 2020-02-26 PROBLEM — E66.09 CLASS 2 OBESITY DUE TO EXCESS CALORIES IN ADULT: Status: ACTIVE | Noted: 2020-02-26

## 2020-02-26 PROBLEM — F11.20 OPIATE DEPENDENCE, CONTINUOUS (HCC): Chronic | Status: ACTIVE | Noted: 2019-09-04

## 2020-02-26 PROBLEM — I10 ESSENTIAL HYPERTENSION: Chronic | Status: ACTIVE | Noted: 2019-09-06

## 2020-02-26 LAB
ALBUMIN SERPL BCP-MCNC: 2.9 G/DL (ref 3.5–5)
ALP SERPL-CCNC: 89 U/L (ref 46–116)
ALT SERPL W P-5'-P-CCNC: 11 U/L (ref 12–78)
ANION GAP SERPL CALCULATED.3IONS-SCNC: 6 MMOL/L (ref 4–13)
AST SERPL W P-5'-P-CCNC: 18 U/L (ref 5–45)
BASOPHILS # BLD AUTO: 0.01 THOUSANDS/ΜL (ref 0–0.1)
BASOPHILS NFR BLD AUTO: 0 % (ref 0–1)
BILIRUB DIRECT SERPL-MCNC: 0.05 MG/DL (ref 0–0.2)
BILIRUB SERPL-MCNC: 0.3 MG/DL (ref 0.2–1)
BUN SERPL-MCNC: 9 MG/DL (ref 5–25)
CALCIUM SERPL-MCNC: 8.7 MG/DL (ref 8.3–10.1)
CHLORIDE SERPL-SCNC: 103 MMOL/L (ref 100–108)
CO2 SERPL-SCNC: 30 MMOL/L (ref 21–32)
CREAT SERPL-MCNC: 0.81 MG/DL (ref 0.6–1.3)
EOSINOPHIL # BLD AUTO: 0.31 THOUSAND/ΜL (ref 0–0.61)
EOSINOPHIL NFR BLD AUTO: 3 % (ref 0–6)
ERYTHROCYTE [DISTWIDTH] IN BLOOD BY AUTOMATED COUNT: 16.9 % (ref 11.6–15.1)
GFR SERPL CREATININE-BSD FRML MDRD: 110 ML/MIN/1.73SQ M
GLUCOSE SERPL-MCNC: 115 MG/DL (ref 65–140)
GLUCOSE SERPL-MCNC: 98 MG/DL (ref 65–140)
HCT VFR BLD AUTO: 39.5 % (ref 36.5–49.3)
HGB BLD-MCNC: 12.3 G/DL (ref 12–17)
IMM GRANULOCYTES # BLD AUTO: 0.08 THOUSAND/UL (ref 0–0.2)
IMM GRANULOCYTES NFR BLD AUTO: 1 % (ref 0–2)
LIPASE SERPL-CCNC: 425 U/L (ref 73–393)
LYMPHOCYTES # BLD AUTO: 2.94 THOUSANDS/ΜL (ref 0.6–4.47)
LYMPHOCYTES NFR BLD AUTO: 28 % (ref 14–44)
MCH RBC QN AUTO: 26.2 PG (ref 26.8–34.3)
MCHC RBC AUTO-ENTMCNC: 31.1 G/DL (ref 31.4–37.4)
MCV RBC AUTO: 84 FL (ref 82–98)
MONOCYTES # BLD AUTO: 0.62 THOUSAND/ΜL (ref 0.17–1.22)
MONOCYTES NFR BLD AUTO: 6 % (ref 4–12)
NEUTROPHILS # BLD AUTO: 6.68 THOUSANDS/ΜL (ref 1.85–7.62)
NEUTS SEG NFR BLD AUTO: 62 % (ref 43–75)
NRBC BLD AUTO-RTO: 0 /100 WBCS
PLATELET # BLD AUTO: 194 THOUSANDS/UL (ref 149–390)
PMV BLD AUTO: 10.2 FL (ref 8.9–12.7)
POTASSIUM SERPL-SCNC: 4.7 MMOL/L (ref 3.5–5.3)
PROT SERPL-MCNC: 7.9 G/DL (ref 6.4–8.2)
RBC # BLD AUTO: 4.69 MILLION/UL (ref 3.88–5.62)
SODIUM SERPL-SCNC: 139 MMOL/L (ref 136–145)
TROPONIN I SERPL-MCNC: <0.02 NG/ML
WBC # BLD AUTO: 10.64 THOUSAND/UL (ref 4.31–10.16)

## 2020-02-26 PROCEDURE — 93005 ELECTROCARDIOGRAM TRACING: CPT

## 2020-02-26 PROCEDURE — 74176 CT ABD & PELVIS W/O CONTRAST: CPT

## 2020-02-26 PROCEDURE — 99232 SBSQ HOSP IP/OBS MODERATE 35: CPT | Performed by: PHYSICIAN ASSISTANT

## 2020-02-26 PROCEDURE — 80076 HEPATIC FUNCTION PANEL: CPT | Performed by: EMERGENCY MEDICINE

## 2020-02-26 PROCEDURE — 84484 ASSAY OF TROPONIN QUANT: CPT | Performed by: EMERGENCY MEDICINE

## 2020-02-26 PROCEDURE — 83690 ASSAY OF LIPASE: CPT | Performed by: EMERGENCY MEDICINE

## 2020-02-26 PROCEDURE — 99285 EMERGENCY DEPT VISIT HI MDM: CPT

## 2020-02-26 PROCEDURE — 85025 COMPLETE CBC W/AUTO DIFF WBC: CPT | Performed by: EMERGENCY MEDICINE

## 2020-02-26 PROCEDURE — 36415 COLL VENOUS BLD VENIPUNCTURE: CPT | Performed by: EMERGENCY MEDICINE

## 2020-02-26 PROCEDURE — 70450 CT HEAD/BRAIN W/O DYE: CPT

## 2020-02-26 PROCEDURE — 99223 1ST HOSP IP/OBS HIGH 75: CPT | Performed by: HOSPITALIST

## 2020-02-26 PROCEDURE — 82948 REAGENT STRIP/BLOOD GLUCOSE: CPT

## 2020-02-26 PROCEDURE — 80048 BASIC METABOLIC PNL TOTAL CA: CPT | Performed by: EMERGENCY MEDICINE

## 2020-02-26 PROCEDURE — 99285 EMERGENCY DEPT VISIT HI MDM: CPT | Performed by: EMERGENCY MEDICINE

## 2020-02-26 RX ORDER — ASPIRIN 325 MG
325 TABLET ORAL ONCE
Status: COMPLETED | OUTPATIENT
Start: 2020-02-26 | End: 2020-02-26

## 2020-02-26 RX ORDER — NICOTINE 21 MG/24HR
1 PATCH, TRANSDERMAL 24 HOURS TRANSDERMAL DAILY
Status: DISCONTINUED | OUTPATIENT
Start: 2020-02-27 | End: 2020-02-28 | Stop reason: HOSPADM

## 2020-02-26 RX ORDER — ACETAMINOPHEN 325 MG/1
650 TABLET ORAL EVERY 6 HOURS PRN
Status: DISCONTINUED | OUTPATIENT
Start: 2020-02-26 | End: 2020-02-28 | Stop reason: HOSPADM

## 2020-02-26 RX ORDER — MORPHINE SULFATE 4 MG/ML
4 INJECTION, SOLUTION INTRAMUSCULAR; INTRAVENOUS ONCE
Status: COMPLETED | OUTPATIENT
Start: 2020-02-26 | End: 2020-02-26

## 2020-02-26 RX ORDER — ONDANSETRON 2 MG/ML
4 INJECTION INTRAMUSCULAR; INTRAVENOUS ONCE
Status: COMPLETED | OUTPATIENT
Start: 2020-02-26 | End: 2020-02-26

## 2020-02-26 RX ORDER — POLYETHYLENE GLYCOL 3350 17 G/17G
17 POWDER, FOR SOLUTION ORAL DAILY PRN
Status: DISCONTINUED | OUTPATIENT
Start: 2020-02-26 | End: 2020-02-28 | Stop reason: HOSPADM

## 2020-02-26 RX ORDER — ONDANSETRON 2 MG/ML
4 INJECTION INTRAMUSCULAR; INTRAVENOUS EVERY 6 HOURS PRN
Status: DISCONTINUED | OUTPATIENT
Start: 2020-02-26 | End: 2020-02-28 | Stop reason: HOSPADM

## 2020-02-26 RX ORDER — HYDROMORPHONE HCL/PF 1 MG/ML
0.5 SYRINGE (ML) INJECTION
Status: DISCONTINUED | OUTPATIENT
Start: 2020-02-26 | End: 2020-02-27

## 2020-02-26 RX ORDER — SODIUM CHLORIDE 9 MG/ML
125 INJECTION, SOLUTION INTRAVENOUS CONTINUOUS
Status: DISCONTINUED | OUTPATIENT
Start: 2020-02-26 | End: 2020-02-27

## 2020-02-26 RX ADMIN — MORPHINE SULFATE 4 MG: 4 INJECTION INTRAVENOUS at 20:44

## 2020-02-26 RX ADMIN — ASPIRIN 325 MG ORAL TABLET 325 MG: 325 PILL ORAL at 23:41

## 2020-02-26 RX ADMIN — ONDANSETRON 4 MG: 2 INJECTION INTRAMUSCULAR; INTRAVENOUS at 20:40

## 2020-02-26 RX ADMIN — SODIUM CHLORIDE 125 ML/HR: 0.9 INJECTION, SOLUTION INTRAVENOUS at 20:41

## 2020-02-26 NOTE — ED PROVIDER NOTES
History  Chief Complaint   Patient presents with    Abdominal Pain     Patient brought in by EMS, patient c/o abd pain and lethergy    Lethargy     HPI patient is a 43-year-old male past medical history of chronic abdominal pain, on oxycodone care, apparently patient had abdominal pain today which she describes similar to his chronic abdominal pain  Old records show patient has a history of recurrent pancreatitis  Patient apparently took 5 oxycodone today according to the patient no Tylenol immediately release oxycodone and then was apparently very sleepy  Patient reports he woke up and EMS was in his house  Denies any definite loss of consciousness  He reports a abdominal pain and so decided to come to the hospital   Patient reports he did take oxycodone but denies any Tylenol ingestion  Patient reports he normally takes oxycodone for breakthrough pain  He takes oxycodone without any Tylenol added to it  History of chronic recurrent abdominal pain, review records show the patient does have pancreatitis and a pancreatic pseudocyst  Family history noncontributory  Social history, smoker, denies drug abuse    Prior to Admission Medications   Prescriptions Last Dose Informant Patient Reported? Taking? Pancrelipase, Lip-Prot-Amyl, (ZENPEP) 89555-79810 units CPEP 2/26/2020 at Unknown time  No Yes   Sig: Take 3 capsules by mouth 3 times daily with meals     atenolol (TENORMIN) 100 mg tablet 2/25/2020 at Unknown time Self Yes Yes   dicyclomine (BENTYL) 20 mg tablet Past Week at Unknown time Self No Yes   Sig: Take 1 tablet (20 mg total) by mouth 2 (two) times a day   diphenhydrAMINE (BENADRYL) 50 mg capsule   No No   Sig: Take 1 capsule by mouth 1 hour prior to CT scan   methadone (DOLOPHINE) 10 mg/mL oral concentrated solution 2/26/2020 at Unknown time Self Yes Yes   Sig: Take 100 mg by mouth   methylPREDNISolone (MEDROL) 32 MG tablet   No No   Sig: Take 1 tablet by mouth 12 hours prior to CT scan and 1 tablet by mouth 2 hours prior to CT scan     ondansetron (ZOFRAN-ODT) 4 mg disintegrating tablet  Self No No   Sig: Take 1 tablet (4 mg total) by mouth every 8 (eight) hours as needed for nausea or vomiting   oxyCODONE (ROXICODONE) 5 mg immediate release tablet 2/26/2020 at Unknown time Self Yes Yes   Sig: Take 5 mg by mouth   pancrelipase, Lip-Prot-Amyl, (PANCREAZE) 17245 units CPEP capsule   No No   Sig: Take 2 capsules (42,000 units of lipase total) by mouth 3 (three) times a day with meals   pregabalin (LYRICA) 100 mg capsule  Self Yes No   Sig: Take 100 mg by mouth 2 (two) times a day    pregabalin (LYRICA) 150 mg capsule 2/26/2020 at Unknown time Self Yes Yes   Sig: Take 150 mg by mouth   prochlorperazine (COMPAZINE) 25 mg suppository Not Taking at Unknown time Self No No   Sig: Insert 1 suppository (25 mg total) into the rectum every 12 (twelve) hours as needed for nausea or vomiting   Patient not taking: Reported on 2/26/2020   promethazine (PHENERGAN) 25 mg tablet  Self Yes Yes   Sig: Take 25 mg by mouth every 6 (six) hours as needed for nausea or vomiting    sucralfate (CARAFATE) 1 g/10 mL suspension 2/26/2020 at Unknown time Self No Yes   Sig: Take 10 mL (1,000 mg total) by mouth 2 (two) times a day before meals      Facility-Administered Medications: None       Past Medical History:   Diagnosis Date    Back pain     Cardiac disease     Chronic pain disorder     GERD (gastroesophageal reflux disease)     Hypertension     Myocardial infarction ([facility] [address] )     Neck pain        Past Surgical History:   Procedure Laterality Date    BACK SURGERY      COLONOSCOPY      HERNIA REPAIR      HERNIA REPAIR  2018    KNEE SURGERY      NECK SURGERY  2005    UPPER GASTROINTESTINAL ENDOSCOPY         Family History   Problem Relation Age of Onset    Diabetes Mother     Heart disease Mother     Cancer Father     No Known Problems Brother     Aneurysm Maternal Grandmother     No Known Problems Maternal Grandfather     No Known Problems Paternal Grandmother     No Known Problems Paternal Grandfather      I have reviewed and agree with the history as documented  E-Cigarette/Vaping     E-Cigarette/Vaping Substances     Social History     Tobacco Use    Smoking status: Current Every Day Smoker     Packs/day: 0 25    Smokeless tobacco: Never Used   Substance Use Topics    Alcohol use: Not Currently     Frequency: Never    Drug use: Never       Review of Systems   Constitutional: Negative for diaphoresis, fatigue and fever  HENT: Negative for congestion, ear pain, nosebleeds and sore throat  Eyes: Negative for photophobia, pain, discharge and visual disturbance  Respiratory: Negative for cough, choking, chest tightness, shortness of breath and wheezing  Cardiovascular: Negative for chest pain and palpitations  Gastrointestinal: Positive for abdominal pain  Negative for abdominal distention, diarrhea and vomiting  Genitourinary: Negative for dysuria, flank pain and frequency  Musculoskeletal: Negative for back pain, gait problem and joint swelling  Skin: Negative for color change and rash  Neurological: Negative for dizziness, syncope and headaches  Psychiatric/Behavioral: Negative for behavioral problems and confusion  The patient is not nervous/anxious  All other systems reviewed and are negative  Physical Exam  Physical Exam   Constitutional: He is oriented to person, place, and time  He appears well-developed and well-nourished  HENT:   Head: Normocephalic  Right Ear: External ear normal    Left Ear: External ear normal    Nose: Nose normal    Mouth/Throat: Oropharynx is clear and moist    Eyes: Pupils are equal, round, and reactive to light  EOM and lids are normal    Neck: Normal range of motion  Neck supple  Cardiovascular: Normal rate, regular rhythm, normal heart sounds and intact distal pulses  Pulmonary/Chest: Effort normal and breath sounds normal  No respiratory distress  Abdominal: Soft  Bowel sounds are normal  There is tenderness  There is epigastric abdominal tenderness without rebound or guarding   Musculoskeletal: Normal range of motion  He exhibits no deformity  Neurological: He is alert and oriented to person, place, and time  Skin: Skin is warm and dry  Psychiatric: He has a normal mood and affect  Nursing note and vitals reviewed  Pulse oximetry 96% on room air initially, patient did have an episode where his pulse oximetry went down to 91%, had supplemental oxygen cases narcotics were making him sleepy      Vital Signs  ED Triage Vitals   Temperature Pulse Respirations Blood Pressure SpO2   02/26/20 1824 02/26/20 1822 02/26/20 1822 02/26/20 1822 02/26/20 1822   99 2 °F (37 3 °C) 79 18 104/57 96 %      Temp Source Heart Rate Source Patient Position - Orthostatic VS BP Location FiO2 (%)   02/26/20 1824 02/26/20 1822 02/26/20 1822 02/26/20 1822 --   Oral Monitor Lying Right arm       Pain Score       02/26/20 1822       7           Vitals:    02/26/20 1822   BP: 104/57   Pulse: 79   Patient Position - Orthostatic VS: Lying         Visual Acuity      ED Medications  Medications   sodium chloride 0 9 % infusion (125 mL/hr Intravenous New Bag 2/26/20 2041)   polyethylene glycol (MIRALAX) packet 17 g (has no administration in time range)   ondansetron (ZOFRAN) injection 4 mg (has no administration in time range)   nicotine (NICODERM CQ) 21 mg/24 hr TD 24 hr patch 1 patch (has no administration in time range)   enoxaparin (LOVENOX) subcutaneous injection 40 mg (has no administration in time range)   acetaminophen (TYLENOL) tablet 650 mg (has no administration in time range)   HYDROmorphone (DILAUDID) injection 0 5 mg (has no administration in time range)   morphine injection 1 mg (has no administration in time range)   morphine injection 2 mg (has no administration in time range)   ondansetron (ZOFRAN) injection 4 mg (4 mg Intravenous Given 2/26/20 2040)   morphine (PF) 4 mg/mL injection 4 mg (4 mg Intravenous Given 2/26/20 2044)       Diagnostic Studies  Results Reviewed     Procedure Component Value Units Date/Time    Platelet count [[id number]]     Lab Status:  No result Specimen:  Blood     Basic metabolic panel [[id number]] Collected:  02/26/20 1840    Lab Status:  Final result Specimen:  Blood from Arm, Right Updated:  02/26/20 1926     Sodium 139 mmol/L      Potassium 4 7 mmol/L      Chloride 103 mmol/L      CO2 30 mmol/L      ANION GAP 6 mmol/L      BUN 9 mg/dL      Creatinine 0 81 mg/dL      Glucose 115 mg/dL      Calcium 8 7 mg/dL      eGFR 110 ml/min/1 73sq m     Narrative:       [place] guidelines for Chronic Kidney Disease (CKD):     Stage 1 with normal or high GFR (GFR > 90 mL/min/1 73 square meters)    Stage 2 Mild CKD (GFR = 60-89 mL/min/1 73 square meters)    Stage 3A Moderate CKD (GFR = 45-59 mL/min/1 73 square meters)    Stage 3B Moderate CKD (GFR = 30-44 mL/min/1 73 square meters)    Stage 4 Severe CKD (GFR = 15-29 mL/min/1 73 square meters)    Stage 5 End Stage CKD (GFR <15 mL/min/1 73 square meters)  Note: GFR calculation is accurate only with a steady state creatinine    Hepatic function panel [[id number]]  (Abnormal) Collected:  02/26/20 1840    Lab Status:  Final result Specimen:  Blood from Arm, Right Updated:  02/26/20 1926     Total Bilirubin 0 30 mg/dL      Bilirubin, Direct 0 05 mg/dL      Alkaline Phosphatase 89 U/L      AST 18 U/L      ALT 11 U/L      Total Protein 7 9 g/dL      Albumin 2 9 g/dL     Lipase [[id number]]  (Abnormal) Collected:  02/26/20 1840    Lab Status:  Final result Specimen:  Blood from Arm, Right Updated:  02/26/20 1926     Lipase 425 u/L     Troponin I [[id number]]  (Normal) Collected:  02/26/20 1840    Lab Status:  Final result Specimen:  Blood from Arm, Right Updated:  02/26/20 1908     Troponin I <0 02 ng/mL     CBC and differential [[id number]]  (Abnormal) Collected:  02/26/20 1839    Lab Status:  Final result Specimen:  Blood from Arm, Right Updated:  02/26/20 1849     WBC 10 64 Thousand/uL      RBC 4 69 Million/uL      Hemoglobin 12 3 g/dL      Hematocrit 39 5 %      MCV 84 fL      MCH 26 2 pg      MCHC 31 1 g/dL      RDW 16 9 %      MPV 10 2 fL      Platelets 461 Thousands/uL      nRBC 0 /100 WBCs      Neutrophils Relative 62 %      Immat GRANS % 1 %      Lymphocytes Relative 28 %      Monocytes Relative 6 %      Eosinophils Relative 3 %      Basophils Relative 0 %      Neutrophils Absolute 6 68 Thousands/µL      Immature Grans Absolute 0 08 Thousand/uL      Lymphocytes Absolute 2 94 Thousands/µL      Monocytes Absolute 0 62 Thousand/µL      Eosinophils Absolute 0 31 Thousand/µL      Basophils Absolute 0 01 Thousands/µL     Rapid drug screen, urine [[id number]]     Lab Status:  No result Specimen:  Urine                  CT abdomen pelvis wo contrast   Final Result by Oneil Elise MD (02/26 1958)      1  Acute distal pancreatitis  2   3 8 x 4 6 cm fluid collection in the pancreatic tail, extending to the splenic hilum, either pseudocyst or, possibly, abscess  3   Hepatic steatosis  Workstation performed: [id number]         CT head without contrast   Final Result by Oneil Elise MD (02/26 1936)      Normal CT of the head  Workstation performed: [id number]                    Procedures  Procedures         ED Course    diagnostic testing showed normal electrolytes, normal liver functions, lipase was 425 consistent with pancreatitis  White count was minimally elevated at 10 6 minimal inflammation, hemoglobin was normal at 12 3 no sign of anemia or bleeding  CT scan abdomen pelvis showed acute distal pancreatitis    There was a pancreatic pseudocyst                    MDM medical decision making 40-year-old male presents with recurrent abdominal pain consistent with his pancreatitis, lipase was elevated, CT showed acute pancreatitis of the distal pancreas  Discussed treatment and admission with patient  Discussed with hospitalist   Patient was stable at the time of admission to hospital       Disposition  Final diagnoses:   Pancreatitis     Time reflects when diagnosis was documented in both MDM as applicable and the Disposition within this note     Time User Action Codes Description Comment    2/26/2020  8:21 PM [facility] Add [K85 90,  K86 1] Acute on chronic pancreatitis ([facility] [address] )     2/26/2020  8:21 PM Elder Pheasant [K85 90,  K86 1] [facility]     2/26/2020  8:21 PM [facility] Add [K86 2] Pancreatic cyst     2/26/2020  9:15 PM Tera Dows Add [K85 90] Pancreatitis       ED Disposition     ED Disposition Condition Date/Time Comment    Admit Stable Wed Feb 26, 2020  8:20 PM Case was discussed with hospitalist service and the patient's admission status was agreed to be 2 midnights the service of Dr Jones      Follow-up Information    None         Patient's Medications   Discharge Prescriptions    No medications on file     No discharge procedures on file      PDMP Review       Value Time User    PDMP Reviewed  Yes 11/14/2019  1:58 PM Zakiya Bruner, [facility]          ED Provider  Electronically Signed by           Helene Morrissey MD  02/26/20 6512

## 2020-02-27 ENCOUNTER — APPOINTMENT (INPATIENT)
Dept: MRI IMAGING | Facility: HOSPITAL | Age: 40
DRG: 282 | End: 2020-02-27
Payer: COMMERCIAL

## 2020-02-27 ENCOUNTER — APPOINTMENT (INPATIENT)
Dept: NEUROLOGY | Facility: HOSPITAL | Age: 40
DRG: 282 | End: 2020-02-27
Attending: PSYCHIATRY & NEUROLOGY
Payer: COMMERCIAL

## 2020-02-27 LAB
AMMONIA PLAS-SCNC: <10 UMOL/L (ref 11–35)
AMPHETAMINES SERPL QL SCN: NEGATIVE
ANION GAP SERPL CALCULATED.3IONS-SCNC: 7 MMOL/L (ref 4–13)
BARBITURATES UR QL: NEGATIVE
BASOPHILS # BLD AUTO: 0.02 THOUSANDS/ΜL (ref 0–0.1)
BASOPHILS NFR BLD AUTO: 0 % (ref 0–1)
BENZODIAZ UR QL: NEGATIVE
BUN SERPL-MCNC: 9 MG/DL (ref 5–25)
CALCIUM SERPL-MCNC: 8.8 MG/DL (ref 8.3–10.1)
CHLORIDE SERPL-SCNC: 103 MMOL/L (ref 100–108)
CO2 SERPL-SCNC: 30 MMOL/L (ref 21–32)
COCAINE UR QL: NEGATIVE
CREAT SERPL-MCNC: 0.74 MG/DL (ref 0.6–1.3)
EOSINOPHIL # BLD AUTO: 0.21 THOUSAND/ΜL (ref 0–0.61)
EOSINOPHIL NFR BLD AUTO: 3 % (ref 0–6)
ERYTHROCYTE [DISTWIDTH] IN BLOOD BY AUTOMATED COUNT: 17 % (ref 11.6–15.1)
EST. AVERAGE GLUCOSE BLD GHB EST-MCNC: 126 MG/DL
GFR SERPL CREATININE-BSD FRML MDRD: 115 ML/MIN/1.73SQ M
GLUCOSE SERPL-MCNC: 80 MG/DL (ref 65–140)
HBA1C MFR BLD: 6 %
HCT VFR BLD AUTO: 41 % (ref 36.5–49.3)
HGB BLD-MCNC: 12.2 G/DL (ref 12–17)
IMM GRANULOCYTES # BLD AUTO: 0.05 THOUSAND/UL (ref 0–0.2)
IMM GRANULOCYTES NFR BLD AUTO: 1 % (ref 0–2)
LYMPHOCYTES # BLD AUTO: 2.57 THOUSANDS/ΜL (ref 0.6–4.47)
LYMPHOCYTES NFR BLD AUTO: 34 % (ref 14–44)
MAGNESIUM SERPL-MCNC: 2.1 MG/DL (ref 1.6–2.6)
MCH RBC QN AUTO: 25.5 PG (ref 26.8–34.3)
MCHC RBC AUTO-ENTMCNC: 29.8 G/DL (ref 31.4–37.4)
MCV RBC AUTO: 86 FL (ref 82–98)
METHADONE UR QL: POSITIVE
MONOCYTES # BLD AUTO: 0.46 THOUSAND/ΜL (ref 0.17–1.22)
MONOCYTES NFR BLD AUTO: 6 % (ref 4–12)
NEUTROPHILS # BLD AUTO: 4.32 THOUSANDS/ΜL (ref 1.85–7.62)
NEUTS SEG NFR BLD AUTO: 56 % (ref 43–75)
NRBC BLD AUTO-RTO: 0 /100 WBCS
OPIATES UR QL SCN: POSITIVE
PCP UR QL: NEGATIVE
PLATELET # BLD AUTO: 125 THOUSANDS/UL (ref 149–390)
PMV BLD AUTO: 10 FL (ref 8.9–12.7)
POTASSIUM SERPL-SCNC: 4.4 MMOL/L (ref 3.5–5.3)
RBC # BLD AUTO: 4.79 MILLION/UL (ref 3.88–5.62)
SODIUM SERPL-SCNC: 140 MMOL/L (ref 136–145)
THC UR QL: NEGATIVE
WBC # BLD AUTO: 7.63 THOUSAND/UL (ref 4.31–10.16)

## 2020-02-27 PROCEDURE — 80048 BASIC METABOLIC PNL TOTAL CA: CPT | Performed by: HOSPITALIST

## 2020-02-27 PROCEDURE — 99255 IP/OBS CONSLTJ NEW/EST HI 80: CPT | Performed by: INTERNAL MEDICINE

## 2020-02-27 PROCEDURE — 74181 MRI ABDOMEN W/O CONTRAST: CPT

## 2020-02-27 PROCEDURE — 85025 COMPLETE CBC W/AUTO DIFF WBC: CPT | Performed by: HOSPITALIST

## 2020-02-27 PROCEDURE — 82140 ASSAY OF AMMONIA: CPT | Performed by: NURSE PRACTITIONER

## 2020-02-27 PROCEDURE — 99233 SBSQ HOSP IP/OBS HIGH 50: CPT | Performed by: INTERNAL MEDICINE

## 2020-02-27 PROCEDURE — 83735 ASSAY OF MAGNESIUM: CPT | Performed by: HOSPITALIST

## 2020-02-27 PROCEDURE — 95816 EEG AWAKE AND DROWSY: CPT | Performed by: PSYCHIATRY & NEUROLOGY

## 2020-02-27 PROCEDURE — 80307 DRUG TEST PRSMV CHEM ANLYZR: CPT | Performed by: EMERGENCY MEDICINE

## 2020-02-27 PROCEDURE — 83036 HEMOGLOBIN GLYCOSYLATED A1C: CPT | Performed by: HOSPITALIST

## 2020-02-27 PROCEDURE — 95819 EEG AWAKE AND ASLEEP: CPT

## 2020-02-27 PROCEDURE — 70551 MRI BRAIN STEM W/O DYE: CPT

## 2020-02-27 PROCEDURE — 99223 1ST HOSP IP/OBS HIGH 75: CPT | Performed by: PSYCHIATRY & NEUROLOGY

## 2020-02-27 RX ORDER — SODIUM CHLORIDE, SODIUM LACTATE, POTASSIUM CHLORIDE, CALCIUM CHLORIDE 600; 310; 30; 20 MG/100ML; MG/100ML; MG/100ML; MG/100ML
50 INJECTION, SOLUTION INTRAVENOUS CONTINUOUS
Status: DISCONTINUED | OUTPATIENT
Start: 2020-02-27 | End: 2020-02-27

## 2020-02-27 RX ORDER — PREGABALIN 100 MG/1
100 CAPSULE ORAL 2 TIMES DAILY
Status: DISCONTINUED | OUTPATIENT
Start: 2020-02-27 | End: 2020-02-28 | Stop reason: HOSPADM

## 2020-02-27 RX ORDER — SODIUM CHLORIDE, SODIUM LACTATE, POTASSIUM CHLORIDE, CALCIUM CHLORIDE 600; 310; 30; 20 MG/100ML; MG/100ML; MG/100ML; MG/100ML
200 INJECTION, SOLUTION INTRAVENOUS CONTINUOUS
Status: DISCONTINUED | OUTPATIENT
Start: 2020-02-27 | End: 2020-02-27

## 2020-02-27 RX ORDER — SUCRALFATE ORAL 1 G/10ML
1000 SUSPENSION ORAL
Status: DISCONTINUED | OUTPATIENT
Start: 2020-02-27 | End: 2020-02-28 | Stop reason: HOSPADM

## 2020-02-27 RX ORDER — DICYCLOMINE HCL 20 MG
20 TABLET ORAL 2 TIMES DAILY
Status: DISCONTINUED | OUTPATIENT
Start: 2020-02-27 | End: 2020-02-28 | Stop reason: HOSPADM

## 2020-02-27 RX ORDER — METHADONE HYDROCHLORIDE 10 MG/ML
100 CONCENTRATE ORAL DAILY
Status: COMPLETED | OUTPATIENT
Start: 2020-02-27 | End: 2020-02-27

## 2020-02-27 RX ORDER — ATENOLOL 50 MG/1
100 TABLET ORAL DAILY
Status: DISCONTINUED | OUTPATIENT
Start: 2020-02-27 | End: 2020-02-28 | Stop reason: HOSPADM

## 2020-02-27 RX ADMIN — PANCRELIPASE 72000 UNITS: 24000; 76000; 120000 CAPSULE, DELAYED RELEASE PELLETS ORAL at 18:55

## 2020-02-27 RX ADMIN — ONDANSETRON 4 MG: 2 INJECTION INTRAMUSCULAR; INTRAVENOUS at 08:22

## 2020-02-27 RX ADMIN — DICYCLOMINE HYDROCHLORIDE 20 MG: 20 TABLET ORAL at 08:22

## 2020-02-27 RX ADMIN — SODIUM CHLORIDE, SODIUM LACTATE, POTASSIUM CHLORIDE, AND CALCIUM CHLORIDE 200 ML/HR: .6; .31; .03; .02 INJECTION, SOLUTION INTRAVENOUS at 11:45

## 2020-02-27 RX ADMIN — PREGABALIN 100 MG: 100 CAPSULE ORAL at 08:22

## 2020-02-27 RX ADMIN — MORPHINE SULFATE 2 MG: 2 INJECTION, SOLUTION INTRAMUSCULAR; INTRAVENOUS at 02:26

## 2020-02-27 RX ADMIN — PANCRELIPASE 72000 UNITS: 24000; 76000; 120000 CAPSULE, DELAYED RELEASE PELLETS ORAL at 12:28

## 2020-02-27 RX ADMIN — DICYCLOMINE HYDROCHLORIDE 20 MG: 20 TABLET ORAL at 18:55

## 2020-02-27 RX ADMIN — METHADONE HYDROCHLORIDE 100 MG: 10 CONCENTRATE ORAL at 08:21

## 2020-02-27 RX ADMIN — PREGABALIN 100 MG: 100 CAPSULE ORAL at 18:55

## 2020-02-27 RX ADMIN — SUCRALFATE 1000 MG: 1 SUSPENSION ORAL at 08:22

## 2020-02-27 RX ADMIN — SUCRALFATE 1000 MG: 1 SUSPENSION ORAL at 18:55

## 2020-02-27 NOTE — PLAN OF CARE
Problem: PAIN - ADULT  Goal: Verbalizes/displays adequate comfort level or baseline comfort level  Description  Interventions:  - Encourage patient to monitor pain and request assistance  - Assess pain using appropriate pain scale  - Administer analgesics based on type and severity of pain and evaluate response  - Implement non-pharmacological measures as appropriate and evaluate response  - Consider cultural and social influences on pain and pain management  - Notify physician/advanced practitioner if interventions unsuccessful or patient reports new pain  Outcome: Progressing     Problem: SAFETY ADULT  Goal: Patient will remain free of falls  Description  INTERVENTIONS:  - Assess patient frequently for physical needs  -  Identify cognitive and physical deficits and behaviors that affect risk of falls    -  [place] fall precautions as indicated by assessment   - Educate patient/family on patient safety including physical limitations  - Instruct patient to call for assistance with activity based on assessment  - Modify environment to reduce risk of injury  - Consider OT/PT consult to assist with strengthening/mobility  Outcome: Progressing     Problem: NEUROSENSORY - ADULT  Goal: Achieves stable or improved neurological status  Description  INTERVENTIONS  - Monitor and report changes in neurological status  - Monitor vital signs such as temperature, blood pressure, glucose, and any other labs ordered   - Initiate measures to prevent increased intracranial pressure  - Monitor for seizure activity and implement precautions if appropriate      Outcome: Progressing  Goal: Remains free of injury related to seizures activity  Description  INTERVENTIONS  - Maintain airway, patient safety  and administer oxygen as ordered  - Monitor patient for seizure activity, document and report duration and description of seizure to physician/advanced practitioner  - If seizure occurs,  ensure patient safety during seizure  - Reorient patient post seizure  - Seizure pads on all 4 side rails  - Instruct patient/family to notify RN of any seizure activity including if an aura is experienced  - Instruct patient/family to call for assistance with activity based on nursing assessment  - Administer anti-seizure medications if ordered    Outcome: Progressing  Goal: Achieves maximal functionality and self care  Description  INTERVENTIONS  - Monitor swallowing and airway patency with patient fatigue and changes in neurological status  - Encourage and assist patient to increase activity and self care     - Encourage visually impaired, hearing impaired and aphasic patients to use assistive/communication devices  Outcome: Progressing

## 2020-02-27 NOTE — UTILIZATION REVIEW
Notification of Inpatient Admission/Inpatient Authorization Request   This is a Notification of Inpatient Admission for [address]  Be advised that this patient was admitted to our facility under Inpatient Status  Contact Boom Hayes at [phone] for additional admission information  [facility] DEPT DEDICATED Amber Chavez [phone]  Patient Name:   Luanne De León   YOB: 1980       [address]   [address]:   [address]   Tax ID: [id number]  NPI: [id number] Attending Provider/NPI: Augustin Michele, [address] [[id number]]   Place of Service Code: 24     Place of Service Name:  [address] [place] [address]   Start Date: 2/26/20 2021     Discharge Date & Time: No discharge date for patient encounter  Type of Admission: Inpatient Status Discharge Disposition   (if discharged): Home/Self Care   Patient Diagnoses: Pancreatic cyst [K86 2]  Pancreatitis [K85 90]  Abdominal pain [R10 9]  Acute on chronic pancreatitis ([facility] [address] ) [K85 90, K86 1]     Orders: Admission Orders (From admission, onward)     Ordered        02/26/20 2021  Inpatient Admission  Once         02/26/20 2022  Inpatient Admission  Once                    Assigned Utilization Review Contact: Boom Hayes  Utilization   Network Utilization Review Department  Phone: [phone]; Fax [phone]  Email: [email or web address]   ATTENTION PAYERS: Please call the assigned Utilization  directly with any questions or concerns ALL voicemails in the department are confidential  Send all requests for admission clinical reviews, approved or denied determinations and any other requests to dedicated fax number belonging to the campus where the patient is receiving treatment

## 2020-02-27 NOTE — CONSULTS
Consultation - Neurology   Karma Koehler 37 y o  male MRN: [id number]  Unit/Bed#: -01 Encounter: [id number]    Assessment/Plan   Assessment:  47-year-old male with opioid dependence, acute on chronic pancreatitis, presents with prolonged episode of amnesia/altered mental status  Amnesia:  -patient per his wife was acting bizarrely for the entire day yesterday as described in the HPI, with patient having no recollection of yesterday's events  -he says his wife told him his arms curled up at 1 point, but he did not have any significant adventitious movements  He also states that she told him that he passed out on several occasions yesterday   -patient insists he did not take more narcotic pain medication than usual yesterday, though he does not recall what occurred   -no prior seizure history     -cannot exclude ictal event, though need to also consider possibility of patient over taking his pain medications  -urine drug screen positive for opiates and methadone  Facial asymmetry:  -patient does have asymmetric appearing mouth, but symmetrical smile   -this is present in his 's license photo from [id number]  Acute on chronic pancreatitis:  -patient is chronically on narcotic pain medications  Results:  -CT brain demonstrated no acute changes  -urine drug screen positive for opiates and methadone  Plan:  1  MRI brain and routine EEG  2  Pain control as per primary service  3  Treatment of pancreatitis as per primary service  History of Present Illness     Reason for Consult / Principal Problem:  Amnesia/altered mental status  Hx and PE limited by:  N/A  HPI: Karma Koehler is a 37 y o  right handed male with pancreatic pseudocyst with acute on chronic pancreatitis, DM2, hypertension, tobacco abuse, and opioid dependence, presents with prolonged episode of amnesia/altered mental status      The patient states he cannot recall any of the events that occurred yesterday until he was admitted to the hospital   He says that his wife told him he was acting very strangely for the entire day, arguing with her and performing bizarre actions such as trying to light a cigarette in his mouth that was not actually there  His wife and his friend attempted to assist taking him to the bathroom, and he does not recall this  He also states his wife told him that he lost consciousness several times and that at 1 point he exhibited curling of his arms, similar to what had occurred years ago during an anaphylactic reaction  He denies any bowel or bladder incontinence or tongue bite or reported adventitious movements  When asked about the medications he took yesterday, he states he did not take too much of his pain medications (though he does not recall the events of yesterday and cannot be sure)  He does not believe he was in any more pain than he normally is with regard to his pancreatitis  He states the reason he was taken to the hospital is because of his bizarre behavior, not because of increased pain  He denies any prior history of similar events, denies history of seizures, stroke or TIA  He immediately fell back at his baseline once he was admitted to the hospital and describes events of yesterday as feeling like a dream      There are also notes in the records describing a facial droop with a rapid response overnight for the same  Patient does have a left facial asymmetry (particularly his mouth), which is present in his old 's license photo taken in 2014  Inpatient consult to Neurology  Consult performed by: Jerson Nguyen PA-C  Consult ordered by: Gloria Plasencia MD          Review of Systems   Constitutional: Negative  HENT: Negative  Eyes: Negative  Respiratory: Negative  Cardiovascular: Negative  Gastrointestinal: Positive for abdominal pain  Endocrine: Negative  Genitourinary: Negative  Musculoskeletal: Negative  Skin: Negative for rash  Allergic/Immunologic: Negative  Neurological:        As above  Hematological: Negative  Psychiatric/Behavioral: Positive for confusion  Historical Information   Past Medical History:   Diagnosis Date    Back pain     Cardiac disease     Chronic pain disorder     GERD (gastroesophageal reflux disease)     Hypertension     Myocardial infarction ([address] )     Neck pain      Past Surgical History:   Procedure Laterality Date    BACK SURGERY      COLONOSCOPY      HERNIA REPAIR      HERNIA REPAIR  2018    KNEE SURGERY      NECK SURGERY  2005    UPPER GASTROINTESTINAL ENDOSCOPY       Social History   Social History     Substance and Sexual Activity   Alcohol Use Not Currently    Frequency: Never     Social History     Substance and Sexual Activity   Drug Use Never     E-Cigarette/Vaping     E-Cigarette/Vaping Substances     Social History     Tobacco Use   Smoking Status Current Every Day Smoker    Packs/day: 0 25   Smokeless Tobacco Never Used     Family History:   Family History   Problem Relation Age of Onset    Diabetes Mother     Heart disease Mother     Cancer Father     No Known Problems Brother     Aneurysm Maternal Grandmother     No Known Problems Maternal Grandfather     No Known Problems Paternal Grandmother     No Known Problems Paternal Grandfather        Review of previous medical records was completed  Meds/Allergies   PTA meds:   Prior to Admission Medications   Prescriptions Last Dose Informant Patient Reported? Taking? Pancrelipase, Lip-Prot-Amyl, (ZENPEP) 31591-63277 units CPEP 2/26/2020 at Unknown time  No Yes   Sig: Take 3 capsules by mouth 3 times daily with meals     atenolol (TENORMIN) 100 mg tablet 2/25/2020 at Unknown time Self Yes Yes   dicyclomine (BENTYL) 20 mg tablet Past Week at Unknown time Self No Yes   Sig: Take 1 tablet (20 mg total) by mouth 2 (two) times a day   diphenhydrAMINE (BENADRYL) 50 mg capsule   No No   Sig: Take 1 capsule by mouth 1 hour prior to CT scan   methadone (DOLOPHINE) 10 mg/mL oral concentrated solution 2/26/2020 at Unknown time Self Yes Yes   Sig: Take 100 mg by mouth   methylPREDNISolone (MEDROL) 32 MG tablet   No No   Sig: Take 1 tablet by mouth 12 hours prior to CT scan and 1 tablet by mouth 2 hours prior to CT scan  ondansetron (ZOFRAN-ODT) 4 mg disintegrating tablet  Self No No   Sig: Take 1 tablet (4 mg total) by mouth every 8 (eight) hours as needed for nausea or vomiting   oxyCODONE (ROXICODONE) 5 mg immediate release tablet 2/26/2020 at Unknown time Self Yes Yes   Sig: Take 5 mg by mouth   pancrelipase, Lip-Prot-Amyl, (PANCREAZE) 12816 units CPEP capsule   No No   Sig: Take 2 capsules (42,000 units of lipase total) by mouth 3 (three) times a day with meals   pregabalin (LYRICA) 100 mg capsule  Self Yes No   Sig: Take 100 mg by mouth 2 (two) times a day    pregabalin (LYRICA) 150 mg capsule 2/26/2020 at Unknown time Self Yes Yes   Sig: Take 150 mg by mouth   prochlorperazine (COMPAZINE) 25 mg suppository Not Taking at Unknown time Self No No   Sig: Insert 1 suppository (25 mg total) into the rectum every 12 (twelve) hours as needed for nausea or vomiting   Patient not taking: Reported on 2/26/2020   promethazine (PHENERGAN) 25 mg tablet  Self Yes Yes   Sig: Take 25 mg by mouth every 6 (six) hours as needed for nausea or vomiting    sucralfate (CARAFATE) 1 g/10 mL suspension 2/26/2020 at Unknown time Self No Yes   Sig: Take 10 mL (1,000 mg total) by mouth 2 (two) times a day before meals      Facility-Administered Medications: None       Allergies   Allergen Reactions    Iodinated Diagnostic Agents     Ketorolac Tromethamine      Other reaction(s): Other (Please comment)  Pt goes into shock if given    Penicillins Hives     Other reaction(s): Other (Please comment)  Trouble breathing    Shellfish Allergy     Varenicline Hives       Objective   Vitals:Blood pressure 115/73, pulse 63, temperature 98 °F (36 7 °C), resp  rate 16, height 5' 8" (1 727 m), weight 118 kg (261 lb 3 9 oz), SpO2 94 %  ,Body mass index is 39 72 kg/m²  Intake/Output Summary (Last 24 hours) at 2/27/2020 1129  Last data filed at 2/27/2020 0900  Gross per 24 hour   Intake 0 ml   Output 280 ml   Net -280 ml       Invasive Devices: Invasive Devices     Peripheral Intravenous Line            Peripheral IV 02/26/20 Right Forearm less than 1 day                Physical Exam  Neurologic Exam    Lab Results:   CBC:   Results from last 7 days   Lab Units 02/27/20  0626 02/26/20  1839   WBC Thousand/uL 7 63 10 64*   RBC Million/uL 4 79 4 69   HEMOGLOBIN g/dL 12 2 12 3   HEMATOCRIT % 41 0 39 5   MCV fL 86 84   PLATELETS Thousands/uL 125* 194   , BMP/CMP:   Results from last 7 days   Lab Units 02/27/20  0626 02/26/20  1840   SODIUM mmol/L 140 139   POTASSIUM mmol/L 4 4 4 7   CHLORIDE mmol/L 103 103   CO2 mmol/L 30 30   BUN mg/dL 9 9   CREATININE mg/dL 0 74 0 81   CALCIUM mg/dL 8 8 8 7   AST U/L  --  18   ALT U/L  --  11*   ALK PHOS U/L  --  89   EGFR ml/min/1 73sq m 115 110   , TSH:   , Drug Screen:   Results from last 7 days   Lab Units 02/27/20  0033   BARBITURATE UR  Negative   BENZODIAZEPINE UR  Negative   THC UR  Negative   COCAINE UR  Negative   METHADONE URINE  Positive*   OPIATE UR  Positive*   PCP UR  Negative     Imaging Studies: I have personally reviewed pertinent reports  and I have personally reviewed pertinent films in PACS CT brain  EKG, Pathology, and Other Studies: I have personally reviewed pertinent reports      VTE Prophylaxis: Sequential compression device Irineo Herrera)     Code Status: Level 1 - Full Code  Advance Directive and Living Will:      Power of :    POLST:

## 2020-02-27 NOTE — RAPID RESPONSE
Progress Note - Rapid Response   Rosalio Brennan 37 y o  male MRN: [id number]    Time Called ( Time): 23:07  Date Called: 2/26/2020  Level of Care: MS  Room#: 975  ZVAUGEI Time ( Time): 23:08  Event End Time ( Time): 23:27  Primary reason for call: Acute change in mental status  Interventions:  Airway/Breathing:  No Intervention  Circulation: EKG  Other Treatments: N/A       Assessment:   1  Possible facial droop    Plan:   · Blood glucose WNL  · EKG- NSR  · MRI per primary team (CT Head on admission with no acute intracranial abnormality)  · Neuro checks q2hr x2 then q4hr  · Stat CT head if any changes in neuro exam or drop in GCS >2 points  · Neurology consult, likely needs spot EEG to evaluate for seizures  · Obtain ammonia level  · Fall precautions  · UDS pending       HPI/Chief Complaint (Background/Situation):   Rosalio Brennan is a 37y o  year old male who presented to [facility] today c/o epigastric and LUQ pain- has history of non-alcoholic pancreatitis  Was admitted to the floor with acute pancreatitis  Rapid response called this evening 2/2 concern for left-sided facial droop  Patient's wife also endorsed "catatonic" episode at home prior to admission where patient was unresponsive with some noted shaking- patient does not remember this  CT head on admission negative for any acute intracranial abnormality  Denies any illicit drug use  Denies history of any neurologic issues      Historical Information   Past Medical History:   Diagnosis Date    Back pain     Cardiac disease     Chronic pain disorder     GERD (gastroesophageal reflux disease)     Hypertension     Myocardial infarction ([address] )     Neck pain      Past Surgical History:   Procedure Laterality Date    BACK SURGERY      COLONOSCOPY      HERNIA REPAIR      HERNIA REPAIR  2018    KNEE SURGERY      NECK SURGERY  2005    UPPER GASTROINTESTINAL ENDOSCOPY       Social History   Social History     Substance and Sexual Activity   Alcohol Use Not Currently    Frequency: Never     Social History     Substance and Sexual Activity   Drug Use Never     Social History     Tobacco Use   Smoking Status Current Every Day Smoker    Packs/day: 0 25   Smokeless Tobacco Never Used     Family History: non-contributory    Meds/Allergies     Current Facility-Administered Medications:  acetaminophen 650 mg Oral Q6H PRN Anne Hinds MD    aspirin 325 mg Oral Once MD Lalita Moreno ON 2/27/2020] enoxaparin 40 mg Subcutaneous Daily Anne Hinds MD    HYDROmorphone 0 5 mg Intravenous Q3H PRN Anne Hinds MD    morphine injection 1 mg Intravenous Q3H PRN Anne Hinds MD    morphine injection 2 mg Intravenous Q3H PRN MD Lalita Moreno ON 2/27/2020] nicotine 1 patch Transdermal Daily Anne Hinds MD    ondansetron 4 mg Intravenous Q6H PRN Anne Hinds MD    polyethylene glycol 17 g Oral Daily PRN Anne Hinds MD    sodium chloride 125 mL/hr Intravenous Continuous Anne Hinds MD Last Rate: 125 mL/hr (02/26/20 2041)         sodium chloride 125 mL/hr Last Rate: 125 mL/hr (02/26/20 2041)       Allergies   Allergen Reactions    Iodinated Diagnostic Agents     Ketorolac Tromethamine      Other reaction(s): Other (Please comment)  Pt goes into shock if given    Penicillins Hives     Other reaction(s): Other (Please comment)  Trouble breathing    Shellfish Allergy     Varenicline Hives       ROS: Negative except abdominal pain    Vitals:     /78  SpO2 99% on RA  HR 74  RR 20  Temp 99 1    Physical Exam:  Gen:Sitting on edge of bed, no acute distress  HEENT:PERRL, EOM intact  Neck:Full ROM  Chest:Respirations unlabored, no respiratory distress  Cor:NSR on EKG  Abd:Abdominal tenderness  Neuro:No noted focal deficits  Strength 5/5 in extremities  No pronator drift   With face at rest, appears to be a barely appreciable left side facial droop, however this disappears with conversation and laughing  Alert and oriented x4  No intake or output data in the 24 hours ending 02/26/20 2332    Respiratory    Lab Data (Last 4 hours)    None         O2/Vent Data (Last 4 hours)    None              Invasive Devices     Peripheral Intravenous Line            Peripheral IV 02/26/20 Right Forearm less than 1 day                DIAGNOSTIC DATA:    Lab: I have personally reviewed pertinent lab results  CBC:   Results from last 7 days   Lab Units 02/26/20  1839   WBC Thousand/uL 10 64*   HEMOGLOBIN g/dL 12 3   HEMATOCRIT % 39 5   PLATELETS Thousands/uL 194     CMP:   Results from last 7 days   Lab Units 02/26/20  1840   POTASSIUM mmol/L 4 7   CHLORIDE mmol/L 103   CO2 mmol/L 30   BUN mg/dL 9   CREATININE mg/dL 0 81   CALCIUM mg/dL 8 7   ALK PHOS U/L 89   ALT U/L 11*   AST U/L 18     PT/INR:   No results found for: PT, INR,   Magnesium: No components found for: MAG,   Phosphorous: No results found for: PHOS    Microbiology:  No results found for: Layne Records, SPUTUMCULTUR      OUTCOME:   Stayed in room   Code Status: Level 1 - Full Code  Critical Care Time: Total Critical Care time spent 0 minutes excluding procedures, teaching and family updates

## 2020-02-27 NOTE — PROGRESS NOTES
[facility] Progress Note  Patient: Keegan Jim 37 y o  male   MRN: [id number]  PCP: Binta Pepe DO  Unit/Bed#: -[facility] Encounter: [id number]  Date Of Visit: 02/27/20          * Transient alteration of awareness  Assessment & Plan  Exact etiology not clear  His mental status is back to baseline  Neuro workup in progress  Other chronic pancreatitis ([facility] )  Assessment & Plan  As per patient, his abdominal pain symptoms are not really worse than his chronic symptoms  Would cut back on IV fluids  Continue with other treatment  Class 2 obesity due to excess calories in adult  Assessment & Plan  Low-calorie diet when acceptable    Essential hypertension  Assessment & Plan  Continue atenolol with holding parameters    Opiate dependence, continuous (HCC)  Assessment & Plan  On methadone chronically  Tobacco use  Assessment & Plan  Nicotine patch ordered and encouraged to quit    Pancreatic cyst  Assessment & Plan  Management as above  Patient noted to have a low-grade fever of 37 3 C in the ER, no other positive SIRS criteria  Will observe off antibiotic unless definitive  positive sepsis, leukocytosis, fever  Thrombocytopenia:  His platelets have been up and down off and on in the past   Continue to monitor as needed    Present on Admission:   [facility] ([facility] )   Pancreatic cyst   Tobacco use   Essential hypertension   Class 2 obesity due to excess calories in adult   Opiate dependence, continuous ([facility] )   (Resolved) Diabetes mellitus without complication ([facility] )   Transient alteration of awareness            VTE Pharmacologic Prophylaxis:   Pharmacologic: Enoxaparin (Lovenox)  Mechanical VTE Prophylaxis in Place: Yes    Patient Centered Rounds: I have performed bedside rounds with nursing staff today  Discussions with Specialists or Other Care Team Provider:  Yes    Education and Discussions with Family / Patient:  Yes    Time Spent for Care: 35+ minutes  More than 50% of total time spent on counseling and coordination of care as described above  Current Length of Stay: 1 day(s)    Current Patient Status: Inpatient   Certification Statement: The patient will continue to require additional inpatient hospital stay due to transient change in mental status/syncope with loss of consciousness    Discharge Plan:  Home when stable    Code Status: Level 1 - Full Code      Subjective:   As per patient, he was at home and sitting and then just passed out  He does not remember how he got to the hospital   He does not have any significant pain in his belly other than his usual chronic upper abdominal pain  No nausea vomiting  No fever or chills  No chest pain    Objective:     Vitals:   Temp (24hrs), Av 3 °F (36 8 °C), Min:97 2 °F (36 2 °C), Max:99 2 °F (37 3 °C)    Temp:  [97 2 °F (36 2 °C)-99 2 °F (37 3 °C)] 98 8 °F (37 1 °C)  HR:  [60-79] 66  Resp:  [16-20] 18  BP: (104-130)/(57-78) 116/74  SpO2:  [93 %-99 %] 93 %  Body mass index is 39 89 kg/m²  Input and Output Summary (last 24 hours): Intake/Output Summary (Last 24 hours) at 2020 1612  Last data filed at 2020 1249  Gross per 24 hour   Intake 240 ml   Output 280 ml   Net -40 ml           Physical Exam:     Vital signs reviewed as above  Obese male who is up in the chair  Not in any respiratory distress  Oropharynx are crowded  Neck is obese  S1 and S2 audible  Poor air entry on auscultation because of body habitus  Abdomen is obese  Has mild tenderness upper for of his abdomen    Bowel sounds are audible  No cyanosis or clubbing  Generally depressed  Skin is warm and dry  EOM grossly intact  Moving his arms while in bed    Additional Data:     Labs:    Results from last 7 days   Lab Units 20  0626   WBC Thousand/uL 7 63   HEMOGLOBIN g/dL 12 2   HEMATOCRIT % 41 0   PLATELETS Thousands/uL 125*   NEUTROS PCT % 56   LYMPHS PCT % 34   MONOS PCT % 6   EOS PCT % 3     Results from last 7 days Lab Units 02/27/20  0626 02/26/20  1840   POTASSIUM mmol/L 4 4 4 7   CHLORIDE mmol/L 103 103   CO2 mmol/L 30 30   BUN mg/dL 9 9   CREATININE mg/dL 0 74 0 81   CALCIUM mg/dL 8 8 8 7   ALK PHOS U/L  --  89   ALT U/L  --  11*   AST U/L  --  18           * I Have Reviewed All Lab Data Listed Above  * Additional Pertinent Lab Tests Reviewed: All Labs Within Last 24 Hours Reviewed      Recent Cultures (last 7 days):           Last 24 Hours Medication List:     Current Facility-Administered Medications:  acetaminophen 650 mg Oral Q6H PRN Prabhu Boss MD   atenolol 100 mg Oral Daily Prabhu Boss MD   dicyclomine 20 mg Oral BID Prabhu Boss MD   enoxaparin 40 mg Subcutaneous Daily Prabhu Boss MD   morphine injection 1 mg Intravenous Q3H PRN Tatiana Wagner MD   nicotine 1 patch Transdermal Daily Prabhu Boss MD   ondansetron 4 mg Intravenous Q6H PRN Prabhu Boss MD   pancrelipase (Lip-Prot-Amyl) 72,000 Units Oral TID With Meals Catie Alanis PA-C   polyethylene glycol 17 g Oral Daily PRN Prabhu Boss MD   pregabalin 100 mg Oral BID Prabhu Boss MD   sucralfate 1,000 mg Oral BID AC Prabhu Boss MD        Today, Patient Was Seen By: Tatiana Wagner MD    ** Please Note: Dragon 360 Dictation voice to text software may have been used in the creation of this document   **

## 2020-02-27 NOTE — ASSESSMENT & PLAN NOTE
Patient with known history of chronic pancreatitis pseudocyst who presented initially with increased somnolence altered mental status due to over-sedation him cell with Percocet which he took for abdominal pain  Lipase 425, WBC 10 6, LFTs normal  CT of the head unremarkable for acute intracranial pathology   CT of abdomen and pelvis reported "1  Acute distal pancreatitis  2   3 8 x 4 6 cm fluid collection in the pancreatic tail, extending to the splenic hilum, either pseudocyst or, possibly, abscess "  WBC normal patient afebrile  Keep NPO  IV fluids, antiemetics continue current pain management  Methadone should be verified in a m    Follow-up urine drug screen  GI consult

## 2020-02-27 NOTE — PROGRESS NOTES
Pt came to floor, with possible lt sided facial droop, no other deficits noted  Pt was A&O but said he felt like "was in a dream"  Pt unable to explain what happened prior to coming to hospital   Wife was contacted as explained in previous note  Contacted admitting doc, who came bedside  Rapid Response was called

## 2020-02-27 NOTE — ASSESSMENT & PLAN NOTE
As per patient, his abdominal pain symptoms are not really worse than his chronic symptoms  Would cut back on IV fluids  Continue with other treatment

## 2020-02-27 NOTE — ED NOTES
1  CC- Chronic abd pain with increased lethergy    2  Is this admission due to an injury?- No    3  Orientation status - Alert and oriented x 4    4  Abnormal labs/abnormal focused assessment/abnormal vitals- Lipase 425, acute on chronic pancreatitis    5  Medications/drips- NS @ 125ml/hr    6  Last time narcotics/pain meds given- Morphine @ 2044    7  IV lines/drains/etc - 20g right forearm, NC @ 2 LPM because O2 was around 90 for a little while    8  Isolation status- none    9  Skin- intact    10  Ambulation status- Self    11   ED phone Sarahi Lyman 104, RN  02/26/20 2150       Richard Downs, [facility]  02/26/20 5838

## 2020-02-27 NOTE — ASSESSMENT & PLAN NOTE
Management as above  Patient noted to have a low-grade fever of 37 3 C in the ER, no other positive SIRS criteria  Will observe off antibiotic unless definitive  positive sepsis, leukocytosis, fever

## 2020-02-27 NOTE — PLAN OF CARE
Problem: PAIN - ADULT  Goal: Verbalizes/displays adequate comfort level or baseline comfort level  Description  Interventions:  - Encourage patient to monitor pain and request assistance  - Assess pain using appropriate pain scale  - Administer analgesics based on type and severity of pain and evaluate response  - Implement non-pharmacological measures as appropriate and evaluate response  - Consider cultural and social influences on pain and pain management  - Notify physician/advanced practitioner if interventions unsuccessful or patient reports new pain  Outcome: Progressing     Problem: SAFETY ADULT  Goal: Patient will remain free of falls  Description  INTERVENTIONS:  - Assess patient frequently for physical needs  -  Identify cognitive and physical deficits and behaviors that affect risk of falls    -  [place] fall precautions as indicated by assessment   - Educate patient/family on patient safety including physical limitations  - Instruct patient to call for assistance with activity based on assessment  - Modify environment to reduce risk of injury  - Consider OT/PT consult to assist with strengthening/mobility  Outcome: Progressing     Problem: DISCHARGE PLANNING  Goal: Discharge to home or other facility with appropriate resources  Description  INTERVENTIONS:  - Identify barriers to discharge w/patient and caregiver  - Arrange for needed discharge resources and transportation as appropriate  - Identify discharge learning needs (meds, wound care, etc )  - Arrange for interpretive services to assist at discharge as needed  - Refer to Case Management Department for coordinating discharge planning if the patient needs post-hospital services based on physician/advanced practitioner order or complex needs related to functional status, cognitive ability, or social support system  Outcome: Progressing     Problem: NEUROSENSORY - ADULT  Goal: Achieves stable or improved neurological status  Description  INTERVENTIONS  - Monitor and report changes in neurological status  - Monitor vital signs such as temperature, blood pressure, glucose, and any other labs ordered   - Initiate measures to prevent increased intracranial pressure  - Monitor for seizure activity and implement precautions if appropriate      Outcome: Progressing  Goal: Remains free of injury related to seizures activity  Description  INTERVENTIONS  - Maintain airway, patient safety  and administer oxygen as ordered  - Monitor patient for seizure activity, document and report duration and description of seizure to physician/advanced practitioner  - If seizure occurs,  ensure patient safety during seizure  - Reorient patient post seizure  - Seizure pads on all 4 side rails  - Instruct patient/family to notify RN of any seizure activity including if an aura is experienced  - Instruct patient/family to call for assistance with activity based on nursing assessment  - Administer anti-seizure medications if ordered    Outcome: Progressing  Goal: Achieves maximal functionality and self care  Description  INTERVENTIONS  - Monitor swallowing and airway patency with patient fatigue and changes in neurological status  - Encourage and assist patient to increase activity and self care     - Encourage visually impaired, hearing impaired and aphasic patients to use assistive/communication devices  Outcome: Progressing     Problem: RESPIRATORY - ADULT  Goal: Achieves optimal ventilation and oxygenation  Description  INTERVENTIONS:  - Assess for changes in respiratory status  - Assess for changes in mentation and behavior  - Position to facilitate oxygenation and minimize respiratory effort  - Oxygen administered by appropriate delivery if ordered  - Initiate smoking cessation education as indicated  - Encourage broncho-pulmonary hygiene including cough, deep breathe, Incentive Spirometry  - Assess the need for suctioning and aspirate as needed  - Assess and instruct to report SOB or any respiratory difficulty  - Respiratory Therapy support as indicated  Outcome: Progressing     Problem: Nutrition/Hydration-ADULT  Goal: Nutrient/Hydration intake appropriate for improving, restoring or maintaining nutritional needs  Description  Monitor and assess patient's nutrition/hydration status for malnutrition  Collaborate with interdisciplinary team and initiate plan and interventions as ordered  Monitor patient's weight and dietary intake as ordered or per policy  Utilize nutrition screening tool and intervene as necessary  Determine patient's food preferences and provide high-protein, high-caloric foods as appropriate       INTERVENTIONS:  - Monitor oral intake, urinary output, labs, and treatment plans  - Assess nutrition and hydration status and recommend course of action  - Evaluate amount of meals eaten  - Assist patient with eating if necessary   - Allow adequate time for meals  - Recommend/ encourage appropriate diets, oral nutritional supplements, and vitamin/mineral supplements  - Order, calculate, and assess calorie counts as needed  - Recommend, monitor, and adjust tube feedings and TPN/PPN based on assessed needs  - Assess need for intravenous fluids  - Provide specific nutrition/hydration education as appropriate  - Include patient/family/caregiver in decisions related to nutrition  Outcome: Progressing

## 2020-02-27 NOTE — UTILIZATION REVIEW
Initial Clinical Review    Admission: Date/Time/Statement: Admission Orders (From admission, onward)     Ordered        02/26/20 2021  Inpatient Admission  Once         02/26/20 2022  Inpatient Admission  Once                   Orders Placed This Encounter   Procedures    Inpatient Admission     Standing Status:   Standing     Number of Occurrences:   1     Order Specific Question:   Admitting Physician     Answer:   Ginger Leonardo [[id number]]     Order Specific Question:   Level of Care     Answer:   Med Surg [[id number]]     Order Specific Question:   Estimated length of stay     Answer:   More than 2 Midnights     Order Specific Question:   Certification     Answer:   I certify that inpatient services are medically necessary for this patient for a duration of greater than two midnights  See H&P and MD Progress Notes for additional information about the patient's course of treatment   Inpatient Admission     Standing Status:   Standing     Number of Occurrences:   1     Order Specific Question:   Admitting Physician     Answer:   Ginger Leonardo [[id number]]     Order Specific Question:   Level of Care     Answer:   Med Surg [[id number]]     Order Specific Question:   Estimated length of stay     Answer:   More than 2 Midnights     Order Specific Question:   Certification     Answer:   I certify that inpatient services are medically necessary for this patient for a duration of greater than two midnights  See H&P and MD Progress Notes for additional information about the patient's course of treatment       ED Arrival Information     Expected Arrival Acuity Means of Arrival Escorted By Service Admission Type    - 2/26/2020 18:16 Urgent Ambulance [facility] Hospitalist Urgent    Arrival Complaint    abd pain         Chief Complaint   Patient presents with    Abdominal Pain     Patient brought in by EMS, patient c/o abd pain and lethergy    Lethargy     Assessment/Plan: 38 yo male to ED from home via EMS w/ c/o epigastric and LUQ abd pain   Hx of non alcohol chronic pancreatitis  IN Ed found to have acute distal pancreatitis 3 8 x 4 6 cm fluid collection in the pancreatic tail may represent pseudocyst versus abscess on CT scan   Admitted IP status plan to keep NPO , IVF , pain control , GI consult and f/u UDS and labs   Pt also w/ transient alteration of awareness, unclear etiology , took his reg percocet and wife reports episodes of catatonia prior to EMS arrival , mild R facial droop   Check CT head, give asa, hold narcotics and consult neuro   2/26 Rapid response   possible facial droop and change in mental status  Bld glucose wnl , ekg NSR , MRI , neuro checks , neuro consult , check ammonia level , fall precautions   ED Triage Vitals   Temperature Pulse Respirations Blood Pressure SpO2   02/26/20 1824 02/26/20 1822 02/26/20 1822 02/26/20 1822 02/26/20 1822   99 2 °F (37 3 °C) 79 18 104/57 96 %      Temp Source Heart Rate Source Patient Position - Orthostatic VS BP Location FiO2 (%)   02/26/20 1824 02/26/20 1822 02/26/20 1822 02/26/20 1822 --   Oral Monitor Lying Right arm       Pain Score       02/26/20 1822       7        Wt Readings from Last 1 Encounters:   02/26/20 118 kg (261 lb 3 9 oz)     Additional Vital Signs:   02/27/20 07:21:31  97 8 °F (36 6 °C)  68  16  108/66  80  96 %  --  --   02/27/20 03:45:52  98 1 °F (36 7 °C)  60  19  109/67  81  96 %  --  --   02/27/20 02:04:41  97 2 °F (36 2 °C)Abnormal   65  20  110/69  83  96 %  --  --   02/26/20 2348  --  --  --  --  --  --  Nasal cannula  --   02/26/20 22:52:34  99 1 °F (37 3 °C)  74  20  130/78  95  99 %  None (Room air)  Lying   02/26/20 1824  99 2 °F (37 3 °C)  --  --  --  --  --           Pertinent Labs/Diagnostic Test Results:   2/26 CT abd 1  Acute distal pancreatitis  2   3 8 x 4 6 cm fluid collection in the pancreatic tail, extending to the splenic hilum, either pseudocyst or, possibly, abscess    3   Hepatic steatosis      2/26 CT head Normal CT of the head      2/27 MRI brain - pending     Results from last 7 days   Lab Units 02/27/20  0626 02/26/20  1839   WBC Thousand/uL 7 63 10 64*   HEMOGLOBIN g/dL 12 2 12 3   HEMATOCRIT % 41 0 39 5   PLATELETS Thousands/uL 125* 194   NEUTROS ABS Thousands/µL 4 32 6 68     Results from last 7 days   Lab Units 02/27/20  0626 02/26/20  1840   SODIUM mmol/L 140 139   POTASSIUM mmol/L 4 4 4 7   CHLORIDE mmol/L 103 103   CO2 mmol/L 30 30   ANION GAP mmol/L 7 6   BUN mg/dL 9 9   CREATININE mg/dL 0 74 0 81   EGFR ml/min/1 73sq m 115 110   CALCIUM mg/dL 8 8 8 7   MAGNESIUM mg/dL 2 1  --      Results from last 7 days   Lab Units 02/27/20  0000 02/26/20  1840   AST U/L  --  18   ALT U/L  --  11*   ALK PHOS U/L  --  89   TOTAL PROTEIN g/dL  --  7 9   ALBUMIN g/dL  --  2 9*   TOTAL BILIRUBIN mg/dL  --  0 30   BILIRUBIN DIRECT mg/dL  --  0 05   AMMONIA umol/L <10*  --      Results from last 7 days   Lab Units 02/26/20  2319   POC GLUCOSE mg/dl 98     Results from last 7 days   Lab Units 02/27/20  0626 02/26/20  1840   GLUCOSE RANDOM mg/dL 80 115       Results from last 7 days   Lab Units 02/26/20  1840   TROPONIN I ng/mL <0 02     Results from last 7 days   Lab Units 02/26/20  1840   LIPASE u/L 425*     Results from last 7 days   Lab Units 02/27/20  0033   AMPH/METH  Negative   BARBITURATE UR  Negative   BENZODIAZEPINE UR  Negative   COCAINE UR  Negative   METHADONE URINE  Positive*   OPIATE UR  Positive*   PCP UR  Negative   THC UR  Negative     ED Treatment:   Medication Administration from 02/26/2020 1816 to 02/26/2020 2231       Date/Time Order Dose Route Action     02/26/2020 2040 ondansetron (ZOFRAN) injection 4 mg 4 mg Intravenous Given     02/26/2020 2044 morphine (PF) 4 mg/mL injection 4 mg 4 mg Intravenous Given     02/26/2020 2041 sodium chloride 0 9 % infusion 125 mL/hr Intravenous New Bag        Past Medical History:   Diagnosis Date    Back pain     Cardiac disease     Chronic pain disorder     GERD (gastroesophageal reflux disease)     Hypertension     Myocardial infarction ([facility] [address] )     Neck pain      Present on Admission:   Acute on chronic pancreatitis (HCC)   Pancreatic cyst   Tobacco use   Essential hypertension   Class 2 obesity due to excess calories in adult   Opiate dependence, continuous (HCC)   (Resolved) Diabetes mellitus without complication (HCC)   Transient alteration of awareness      Admitting Diagnosis: Pancreatic cyst [K86 2]  Pancreatitis [K85 90]  Abdominal pain [R10 9]  Acute on chronic pancreatitis (HCC) [K85 90, K86 1]  Age/Sex: 37 y o  male  Admission Orders:  Scheduled Medications:    Medications:  atenolol 100 mg Oral Daily   dicyclomine 20 mg Oral BID   enoxaparin 40 mg Subcutaneous Daily   nicotine 1 patch Transdermal Daily   pregabalin 100 mg Oral BID   sucralfate 1,000 mg Oral BID AC     Continuous IV Infusions:     PRN Meds:    acetaminophen 650 mg Oral Q6H PRN   HYDROmorphone 0 5 mg Intravenous Q3H PRN   morphine injection 1 mg Intravenous Q3H PRN   morphine injection 2 mg Intravenous Q3H PRN x1   ondansetron 4 mg Intravenous Q6H PRN x1   polyethylene glycol 17 g Oral Daily PRN     NPO   Neuro checks q2hr - L facial droop   Tele   [place] and  Fall precautions   IP CONSULT TO GASTROENTEROLOGY  IP CONSULT TO NEUROLOGY    Network Utilization Review Department  [email or web address]  ATTENTION: Please call with any questions or concerns to [phone] and carefully listen to the prompts so that you are directed to the right person  All voicemails are confidential   Umang Huggins all requests for admission clinical reviews, approved or denied determinations and any other requests to dedicated fax number below belonging to the campus where the patient is receiving treatment   List of dedicated fax numbers for the Facilities:  FACILITY NAME UR FAX NUMBER   ADMISSION DENIALS (Administrative/Medical Necessity) [phone]   [address] (Maternity/NICU/Pediatrics) [place] [phone]   Ron Riggs [phone]   [place] [phone]   [address] [phone]   Blake Moralez [phone]   [address]  [address] [facility] [address] [facility] [phone]

## 2020-02-27 NOTE — ASSESSMENT & PLAN NOTE
Lab Results   Component Value Date    HGBA1C 6 0 09/05/2019       No results for input(s): POCGLU in the last 72 hours      Blood Sugar Average: Last 72 hrs:    Patient is NPO continue Accu-Cheks insulin sliding scale q 6 hours

## 2020-02-27 NOTE — ASSESSMENT & PLAN NOTE
Low-calorie diet when acceptable Tolerated Velcade injection to abdomen with no complications. VS stable. Labs reviewed. Consent in chart. Patient has no complaints at this time. Appointments reviewed. Discharged home.

## 2020-02-27 NOTE — ASSESSMENT & PLAN NOTE
Oversedated with Percocet  Currently at his baseline of mentality  Cautious with narcotics  Continue current pain management

## 2020-02-27 NOTE — PLAN OF CARE
Problem: Nutrition/Hydration-ADULT  Goal: Nutrient/Hydration intake appropriate for improving, restoring or maintaining nutritional needs  Description  Monitor and assess patient's nutrition/hydration status for malnutrition  Collaborate with interdisciplinary team and initiate plan and interventions as ordered  Monitor patient's weight and dietary intake as ordered or per policy  Utilize nutrition screening tool and intervene as necessary  Determine patient's food preferences and provide high-protein, high-caloric foods as appropriate  INTERVENTIONS:  - Monitor oral intake, urinary output, labs, and treatment plans  - Assess nutrition and hydration status and recommend course of action  - Evaluate amount of meals eaten  - Assist patient with eating if necessary   - Allow adequate time for meals  - Recommend/ encourage appropriate diets, oral nutritional supplements, and vitamin/mineral supplements  - Order, calculate, and assess calorie counts as needed  - Recommend, monitor, and adjust tube feedings and TPN/PPN based on assessed needs  - Assess need for intravenous fluids  - Provide specific nutrition/hydration education as appropriate  - Include patient/family/caregiver in decisions related to nutrition  Outcome: Not Progressing  Note:   Pt reports poor po tolerance & intake of 1 meal per day pta  C/o abdominal pain with intake of solid foods

## 2020-02-27 NOTE — H&P
H&P- Heidydavid Venturadavid 1980, 37 y o  male MRN: [id number]    Unit/Bed#: -01 Encounter: [id number]    Primary Care Provider: Yuly Mckeon DO   Date and time admitted to hospital: 2/26/2020  6:17 PM        * Acute on chronic pancreatitis [facility])  Assessment & Plan  Patient with known history of chronic pancreatitis pseudocyst who presented initially with increased somnolence altered mental status due to over-sedation him cell with Percocet which he took for abdominal pain  Lipase 425, WBC 10 6, LFTs normal  CT of the head unremarkable for acute intracranial pathology   CT of abdomen and pelvis reported "1  Acute distal pancreatitis  2   3 8 x 4 6 cm fluid collection in the pancreatic tail, extending to the splenic hilum, either pseudocyst or, possibly, abscess "  WBC normal patient afebrile  Keep NPO  IV fluids, antiemetics continue current pain management  Methadone should be verified in a m  Follow-up urine drug screen  GI consult      Pancreatic cyst  Assessment & Plan  Management as above  Patient noted to have a low-grade fever of 37 3 C in the ER, no other positive SIRS criteria  Will observe off antibiotic unless definitive  positive sepsis, leukocytosis, fever    Transient alteration of awareness  Assessment & Plan  Unclear etiology patient states that he took his regular dose of Percocet 5 mg not 5 pills  He has mild right-sided facial droop for unknown period of time with absolutely no other neurologic deficit and no facial droop upon smiling, however patient wife states that he had episodes of catatonia  prior to be taking by EMS  Urine drug screen pending  Continue current pain management   Hold narcotics for lethargy  Will obtain imaging study of the head in a m   Neurology consult  Unknown onset of symptoms  Give aspirin    Essential hypertension  Assessment & Plan  Continue atenolol with holding parameters    Tobacco use  Assessment & Plan  Nicotine patch ordered    Class 2 obesity due to excess calories in adult  Assessment & Plan  Low-calorie diet when acceptable    Opiate dependence, continuous (HCC)  Assessment & Plan  Currently at his baseline of mentality  Cautious with narcotics  Continue current pain management      VTE Prophylaxis: Enoxaparin (Lovenox)  / sequential compression device   Code Status: full  POLST: There is no POLST form on file for this patient (pre-hospital)  Discussion with family:  No family members at bedside    Anticipated Length of Stay:  Patient will be admitted on an Inpatient basis with an anticipated length of stay of  > 2 midnights  Justification for Hospital Stay:  GI consult, pancreatitis treatment    Total Time for Visit, including Counseling / Coordination of Care: 45 minutes  Greater than 50% of this total time spent on direct patient counseling and coordination of care  Chief Complaint:   Left upper quadrant epigastric abdominal pain    History of Present Illness:    Sarah Henry is a 37 y o  male with history of non alcohol chronic pancreatitis, pseudocyst who presented by EMS altered and complaining on epigastric and left upper quadrant abdominal pain similar to his previous pancreatitis flare ups  Upon my assessment patient is completely at his baseline of mentality he reports that he took his usual dose of Percocet and woke up in the ambulance  He endorses mildly getting worse left upper quadrant pain so similar to his previous pancreatitis flare ups  ER proceeded with imaging study which revealed acute distal pancreatitis 3 8 x 4 6 cm fluid collection in the pancreatic tail may represent pseudocyst versus abscess  Patient noted to have a low-grade fever in the ER though WBC remains normal  Patient denies vomiting, hematemesis, fever ,chills    Lipase 425 LFTs normal creatinine 0 81  Will admit him on an inpatient basis for pancreatitis treatment GI consult and possible IR consult      Review of Systems:    Review of Systems   Gastrointestinal: Positive for abdominal pain  Past Medical and Surgical History:     Past Medical History:   Diagnosis Date    Back pain     Cardiac disease     Chronic pain disorder     GERD (gastroesophageal reflux disease)     Hypertension     Myocardial infarction ([address] )     Neck pain        Past Surgical History:   Procedure Laterality Date    BACK SURGERY      COLONOSCOPY      HERNIA REPAIR      HERNIA REPAIR  2018    KNEE SURGERY      NECK SURGERY  2005    UPPER GASTROINTESTINAL ENDOSCOPY         Meds/Allergies:    Prior to Admission medications    Medication Sig Start Date End Date Taking? Authorizing Provider   atenolol (TENORMIN) 100 mg tablet  9/13/19   Historical Provider, MD   dicyclomine (BENTYL) 20 mg tablet Take 1 tablet (20 mg total) by mouth 2 (two) times a day 10/8/19   Harshad De Jesus DO   diphenhydrAMINE (BENADRYL) 50 mg capsule Take 1 capsule by mouth 1 hour prior to CT scan 12/31/19   Shannon Mccoy PA-C   methadone (DOLOPHINE) 10 mg/mL oral concentrated solution Take 100 mg by mouth    Historical Provider, MD   methylPREDNISolone (MEDROL) 32 MG tablet Take 1 tablet by mouth 12 hours prior to CT scan and 1 tablet by mouth 2 hours prior to CT scan  12/31/19   Shannon Mccoy PA-C   ondansetron (ZOFRAN-ODT) 4 mg disintegrating tablet Take 1 tablet (4 mg total) by mouth every 8 (eight) hours as needed for nausea or vomiting 10/8/19   Harshad De Jesus DO   oxyCODONE (ROXICODONE) 5 mg immediate release tablet Take 5 mg by mouth 11/21/19   Historical Provider, MD   pancrelipase, Lip-Prot-Amyl, (PANCREAZE) 93609 units CPEP capsule Take 2 capsules (42,000 units of lipase total) by mouth 3 (three) times a day with meals 12/27/19   Macho Burnett MD   Pancrelipase, Lip-Prot-Amyl, (ZENPEP) 23792-87864 units CPEP Take 3 capsules by mouth 3 times daily with meals   1/23/20   Rosa Lujan PA-C   pregabalin (LYRICA) 100 mg capsule Take 100 mg by mouth 2 (two) times a day  9/17/19 Historical Provider, MD   pregabalin (LYRICA) 150 mg capsule Take 150 mg by mouth 11/21/19   Historical Provider, MD   prochlorperazine (COMPAZINE) 25 mg suppository Insert 1 suppository (25 mg total) into the rectum every 12 (twelve) hours as needed for nausea or vomiting 10/8/19   Ladonna Castro DO   promethazine (PHENERGAN) 25 mg tablet Take 25 mg by mouth every 6 (six) hours as needed for nausea or vomiting  9/19/19   Historical Provider, MD   sucralfate (CARAFATE) 1 g/10 mL suspension Take 10 mL (1,000 mg total) by mouth 2 (two) times a day before meals 11/14/19   GALO Luciano     I have reviewed home medications with a medical source (PCP, Pharmacy, other)  Allergies: Allergies   Allergen Reactions    Iodinated Diagnostic Agents     Ketorolac Tromethamine      Other reaction(s): Other (Please comment)  Pt goes into shock if given    Penicillins Hives     Other reaction(s):  Other (Please comment)  Trouble breathing    Shellfish Allergy     Varenicline Hives       Social History:     Marital Status: /Civil Union   Occupation: none  Patient Pre-hospital Living Situation:  Home  Patient Pre-hospital Level of Mobility:  Regular  Patient Pre-hospital Diet Restrictions: reg  Substance Use History:   Social History     Substance and Sexual Activity   Alcohol Use Not Currently    Frequency: Never     Social History     Tobacco Use   Smoking Status Current Every Day Smoker    Packs/day: 0 25   Smokeless Tobacco Never Used     Social History     Substance and Sexual Activity   Drug Use Never       Family History:    non-contributory    Physical Exam:     Vitals:   Blood Pressure: 130/78 (02/26/20 2252)  Pulse: 74 (02/26/20 2252)  Temperature: 99 1 °F (37 3 °C) (02/26/20 2252)  Temp Source: Oral (02/26/20 2252)  Respirations: 20 (02/26/20 2252)  Height: 5' 8" (172 7 cm) (02/26/20 2252)  Weight - Scale: 118 kg (261 lb 3 9 oz) (02/26/20 1822)  SpO2: 99 % (02/26/20 2252)    Physical Exam Constitutional: No distress  HENT:   Head: Normocephalic  Eyes: Pupils are equal, round, and reactive to light  Neck: Normal range of motion  Cardiovascular: Regular rhythm  Pulmonary/Chest: Effort normal    Abdominal: He exhibits distension  There is tenderness  Musculoskeletal: He exhibits no edema  Neurological: He is alert  Skin: Skin is warm  Psychiatric: He has a normal mood and affect  Additional Data:     Lab Results: I have personally reviewed pertinent reports  Results from last 7 days   Lab Units 02/26/20  1839   WBC Thousand/uL 10 64*   HEMOGLOBIN g/dL 12 3   HEMATOCRIT % 39 5   PLATELETS Thousands/uL 194   NEUTROS PCT % 62   LYMPHS PCT % 28   MONOS PCT % 6   EOS PCT % 3     Results from last 7 days   Lab Units 02/26/20  1840   SODIUM mmol/L 139   POTASSIUM mmol/L 4 7   CHLORIDE mmol/L 103   CO2 mmol/L 30   BUN mg/dL 9   CREATININE mg/dL 0 81   ANION GAP mmol/L 6   CALCIUM mg/dL 8 7   ALBUMIN g/dL 2 9*   TOTAL BILIRUBIN mg/dL 0 30   ALK PHOS U/L 89   ALT U/L 11*   AST U/L 18   GLUCOSE RANDOM mg/dL 115         Results from last 7 days   Lab Units 02/26/20  2319   POC GLUCOSE mg/dl 98               Imaging: I have personally reviewed pertinent reports  CT abdomen pelvis wo contrast   Final Result by Oneil Elise MD (02/26 1958)      1  Acute distal pancreatitis  2   3 8 x 4 6 cm fluid collection in the pancreatic tail, extending to the splenic hilum, either pseudocyst or, possibly, abscess  3   Hepatic steatosis  Workstation performed: [id number]         CT head without contrast   Final Result by Oneil Elise MD (02/26 1936)      Normal CT of the head  Workstation performed: [id number]             EKG, Pathology, and Other Studies Reviewed on Admission:   · EKG:  Sinus rhythm    Allscripts / Epic Records Reviewed: Yes     ** Please Note: This note has been constructed using a voice recognition system   **

## 2020-02-27 NOTE — ASSESSMENT & PLAN NOTE
Most likely secondary to unintentional opioid overdose  Urine drug screen pending  Continue current pain management   Hold narcotics for lethargy

## 2020-02-27 NOTE — CONSULTS
Consultation - [facility]  Ana Vera 37 y o  male MRN: [id number]  Unit/Bed#: -01 Encounter: [id number]         Reason for Consult / Principal Problem:  Abnormal CT imaging, history of chronic pancreatitis    HPI:  Agnes Munguia is a 64-year-old male with history of recurrent pancreatitis complicated by pseudocyst in the past   Patient also has history of GERD, chronic methadone use, hypertension and CAD not on anticoagulation  Patient presented to the emergency room yesterday via EMS after his wife called for loss of consciousness  Neurology is following the patient  GI was consulted for abnormal CT imaging  Distal pancreatitis seen, as well as a 3 8 x 4 6 cm fluid collection in the tail of the pancreas extending into the splenic hilum  He had imagining earlier this year that showed no discrete collection in the pancreas  Fatty liver noted as well  Patient was seen and examined at the bedside this morning  He reports that he is having no increase in his usual abdominal pain symptoms  He reports that he has been taking his Creon  He is on 46784 units per meal   His lipase is in the 400s  Liver enzymes are normal   Imaging of his gallbladder in the past did not show any gallstones  Patient had EUS with Dr Kandice Lizarraga in October 2019  During the procedure, it was noted that he had a cyst measuring 60 mm x 42 mm  This was therapeutically drained  There was an additional cyst measuring 32 mm x 28 mm, that was also drain  Review of Systems:    CONSTITUTIONAL: Denies any fever, chills, or rigors  Good appetite, and no recent weight loss  HEENT: No earache or tinnitus  Denies hearing loss or visual disturbances  CARDIOVASCULAR: No chest pain or palpitations  RESPIRATORY: Denies any cough, hemoptysis, shortness of breath or dyspnea on exertion  GASTROINTESTINAL: As noted in the History of Present Illness  GENITOURINARY: No problems with urination   Denies any hematuria or dysuria  NEUROLOGIC: No dizziness or vertigo, denies headaches  MUSCULOSKELETAL: Denies any muscle or joint pain  SKIN: Denies skin rashes or itching  ENDOCRINE: Denies excessive thirst  Denies intolerance to heat or cold  PSYCHOSOCIAL: Denies depression or anxiety  Denies any recent memory loss         Historical Information   Past Medical History:   Diagnosis Date    Back pain     Cardiac disease     Chronic pain disorder     GERD (gastroesophageal reflux disease)     Hypertension     Myocardial infarction ([address] )     Neck pain      Past Surgical History:   Procedure Laterality Date    BACK SURGERY      COLONOSCOPY      HERNIA REPAIR      HERNIA REPAIR  2018    KNEE SURGERY      NECK SURGERY  2005    UPPER GASTROINTESTINAL ENDOSCOPY       Social History   Social History     Substance and Sexual Activity   Alcohol Use Not Currently    Frequency: Never     Social History     Substance and Sexual Activity   Drug Use Never     Social History     Tobacco Use   Smoking Status Current Every Day Smoker    Packs/day: 0 25   Smokeless Tobacco Never Used     Family History   Problem Relation Age of Onset    Diabetes Mother     Heart disease Mother     Cancer Father     No Known Problems Brother     Aneurysm Maternal Grandmother     No Known Problems Maternal Grandfather     No Known Problems Paternal Grandmother     No Known Problems Paternal Grandfather         Meds/Allergies     Current Facility-Administered Medications   Medication Dose Route Frequency    acetaminophen (TYLENOL) tablet 650 mg  650 mg Oral Q6H PRN    atenolol (TENORMIN) tablet 100 mg  100 mg Oral Daily    dicyclomine (BENTYL) tablet 20 mg  20 mg Oral BID    enoxaparin (LOVENOX) subcutaneous injection 40 mg  40 mg Subcutaneous Daily    morphine injection 1 mg  1 mg Intravenous Q3H PRN    nicotine (NICODERM CQ) 21 mg/24 hr TD 24 hr patch 1 patch  1 patch Transdermal Daily    ondansetron (ZOFRAN) injection 4 mg  4 mg Intravenous Q6H PRN    pancrelipase (Lip-Prot-Amyl) (CREON) delayed release capsule 72,000 Units  72,000 Units Oral TID With Meals    polyethylene glycol (MIRALAX) packet 17 g  17 g Oral Daily PRN    pregabalin (LYRICA) capsule 100 mg  100 mg Oral BID    sucralfate (CARAFATE) oral suspension 1,000 mg  1,000 mg Oral BID AC       Allergies   Allergen Reactions    Iodinated Diagnostic Agents     Ketorolac Tromethamine      Other reaction(s): Other (Please comment)  Pt goes into shock if given    Penicillins Hives     Other reaction(s): Other (Please comment)  Trouble breathing    Shellfish Allergy     Varenicline Hives         Objective     Blood pressure 115/73, pulse 63, temperature 98 °F (36 7 °C), resp  rate 16, height 5' 8" (1 727 m), weight 119 kg (262 lb 5 6 oz), SpO2 94 %  Intake/Output Summary (Last 24 hours) at 2/27/2020 1356  Last data filed at 2/27/2020 1249  Gross per 24 hour   Intake 240 ml   Output 280 ml   Net -40 ml         PHYSICAL EXAM:      General Appearance:   Alert and oriented x 3  Cooperative, and in no respiratory distress   HEENT:   Normocephalic, atraumatic, anicteric      Neck:  Supple, symmetrical, trachea midline   Lungs:   Clear to auscultation bilaterally; no rales, rhonchi or wheezing; respirations unlabored    Heart[de-identified]   S1 and S2 normal; regular rate and rhythm; no murmur, rub, or gallop     Abdomen:   Soft, obese, non-tender, non-distended; normal bowel sounds; no masses, no organomegaly    Genitalia:   Deferred    Rectal:   Deferred    Extremities:  No cyanosis, clubbing or edema    Pulses:  2+ and symmetric all extremities    Skin:  Skin color, texture, turgor normal, no rashes or lesions    Lymph nodes:  No palpable cervical or supraclavicular lymphadenopathy        Lab Results:   Results from last 7 days   Lab Units 02/27/20  0626   WBC Thousand/uL 7 63   HEMOGLOBIN g/dL 12 2   HEMATOCRIT % 41 0   PLATELETS Thousands/uL 125*   NEUTROS PCT % 56   LYMPHS PCT % 34 MONOS PCT % 6   EOS PCT % 3     Results from last 7 days   Lab Units 02/27/20  0626 02/26/20  1840   POTASSIUM mmol/L 4 4 4 7   CHLORIDE mmol/L 103 103   CO2 mmol/L 30 30   BUN mg/dL 9 9   CREATININE mg/dL 0 74 0 81   CALCIUM mg/dL 8 8 8 7   ALK PHOS U/L  --  89   ALT U/L  --  11*   AST U/L  --  18         Results from last 7 days   Lab Units 02/26/20  1840   LIPASE u/L 425*       Imaging Studies: I have personally reviewed pertinent imaging studies  Ct Abdomen Pelvis Wo Contrast    Result Date: 2/26/2020  Impression: 1  Acute distal pancreatitis  2   3 8 x 4 6 cm fluid collection in the pancreatic tail, extending to the splenic hilum, either pseudocyst or, possibly, abscess  3   Hepatic steatosis  Workstation performed: [id number]     Ct Head Without Contrast    Result Date: 2/26/2020  Impression: Normal CT of the head  Workstation performed: [id number]       ASSESSMENT and PLAN:      1) Chronic pancreatitis - Patient technically does not fit criteria for acute pancreatitis as he does not have typical abdominal pain or elevated lipase  The patient himself does not feel that he is in an acute attack  He has been compliant with Pelletier Graft  He takes 03384 units per meal   He reports that he usually has 1 bowel movement per day  - Will continue pancreatic enzymes with meals  - Low-fat diet  - Continue monitor symptoms    2) Pancreatic tail fluid collection - Will send for MRI without contrast since the patient has allergy  Likely to represent pseudocyst   Patient will need repeat CT scan several weeks to document whether not this has resolved  He can follow up with Dr Ligia Cruz  The patient was seen and examined by Dr Josue Vitale, all tejeda medical decisions were made with Dr Josue Vitale  Thank you for allowing us to participate in the care of this pleasant patient  We will follow up with you closely

## 2020-02-27 NOTE — PROGRESS NOTES
Patient brought to [facility] and placed in bed by ED tech  I assisted the RN with this assignment and noticed a defined facial droop while relaxed  Patient was otherwise asymptomatic  Patient indicated that he did not remember how he arrived at the hospital but did state that he had never noticed a facial droop PTA  I called patient's wife, Jennifer Garrison and she indicated that she found him catatonic, unresponsive and possibly seizing  She also indicated that he sufffered falls at home  She called the ambulance  Patient indicated that he took 5 mg of Oxycodone PTA  No urine since coming to the ED for a rapid drug screen  Patient encouraged to attempt to urinate for lab collection

## 2020-02-28 VITALS
BODY MASS INDEX: 39.76 KG/M2 | SYSTOLIC BLOOD PRESSURE: 138 MMHG | HEART RATE: 73 BPM | DIASTOLIC BLOOD PRESSURE: 78 MMHG | TEMPERATURE: 97.4 F | WEIGHT: 262.35 LBS | HEIGHT: 68 IN | RESPIRATION RATE: 18 BRPM | OXYGEN SATURATION: 94 %

## 2020-02-28 LAB
ALBUMIN SERPL BCP-MCNC: 2.9 G/DL (ref 3.5–5)
ALP SERPL-CCNC: 88 U/L (ref 46–116)
ALT SERPL W P-5'-P-CCNC: 9 U/L (ref 12–78)
ANION GAP SERPL CALCULATED.3IONS-SCNC: 8 MMOL/L (ref 4–13)
AST SERPL W P-5'-P-CCNC: 10 U/L (ref 5–45)
ATRIAL RATE: 76 BPM
ATRIAL RATE: 83 BPM
BILIRUB SERPL-MCNC: 0.3 MG/DL (ref 0.2–1)
BUN SERPL-MCNC: 10 MG/DL (ref 5–25)
CALCIUM SERPL-MCNC: 9.1 MG/DL (ref 8.3–10.1)
CHLORIDE SERPL-SCNC: 102 MMOL/L (ref 100–108)
CO2 SERPL-SCNC: 28 MMOL/L (ref 21–32)
CREAT SERPL-MCNC: 0.69 MG/DL (ref 0.6–1.3)
ERYTHROCYTE [DISTWIDTH] IN BLOOD BY AUTOMATED COUNT: 16.8 % (ref 11.6–15.1)
GFR SERPL CREATININE-BSD FRML MDRD: 118 ML/MIN/1.73SQ M
GLUCOSE SERPL-MCNC: 93 MG/DL (ref 65–140)
HCT VFR BLD AUTO: 40.1 % (ref 36.5–49.3)
HGB BLD-MCNC: 12.1 G/DL (ref 12–17)
MCH RBC QN AUTO: 25.9 PG (ref 26.8–34.3)
MCHC RBC AUTO-ENTMCNC: 30.2 G/DL (ref 31.4–37.4)
MCV RBC AUTO: 86 FL (ref 82–98)
P AXIS: 36 DEGREES
P AXIS: 37 DEGREES
PLATELET # BLD AUTO: 125 THOUSANDS/UL (ref 149–390)
PMV BLD AUTO: 10.2 FL (ref 8.9–12.7)
POTASSIUM SERPL-SCNC: 4.1 MMOL/L (ref 3.5–5.3)
PR INTERVAL: 162 MS
PR INTERVAL: 166 MS
PROT SERPL-MCNC: 7.8 G/DL (ref 6.4–8.2)
QRS AXIS: 35 DEGREES
QRS AXIS: 65 DEGREES
QRSD INTERVAL: 88 MS
QRSD INTERVAL: 88 MS
QT INTERVAL: 398 MS
QT INTERVAL: 410 MS
QTC INTERVAL: 461 MS
QTC INTERVAL: 467 MS
RBC # BLD AUTO: 4.67 MILLION/UL (ref 3.88–5.62)
SODIUM SERPL-SCNC: 138 MMOL/L (ref 136–145)
T WAVE AXIS: 44 DEGREES
T WAVE AXIS: 47 DEGREES
VENTRICULAR RATE: 76 BPM
VENTRICULAR RATE: 83 BPM
WBC # BLD AUTO: 7.23 THOUSAND/UL (ref 4.31–10.16)

## 2020-02-28 PROCEDURE — 80053 COMPREHEN METABOLIC PANEL: CPT | Performed by: INTERNAL MEDICINE

## 2020-02-28 PROCEDURE — 99233 SBSQ HOSP IP/OBS HIGH 50: CPT | Performed by: PSYCHIATRY & NEUROLOGY

## 2020-02-28 PROCEDURE — 85027 COMPLETE CBC AUTOMATED: CPT | Performed by: INTERNAL MEDICINE

## 2020-02-28 PROCEDURE — 99239 HOSP IP/OBS DSCHRG MGMT >30: CPT | Performed by: INTERNAL MEDICINE

## 2020-02-28 PROCEDURE — 93010 ELECTROCARDIOGRAM REPORT: CPT | Performed by: INTERNAL MEDICINE

## 2020-02-28 RX ORDER — METHADONE HYDROCHLORIDE 10 MG/ML
100 INJECTION, SOLUTION INTRAMUSCULAR; INTRAVENOUS; SUBCUTANEOUS ONCE
Status: DISCONTINUED | OUTPATIENT
Start: 2020-02-28 | End: 2020-02-28

## 2020-02-28 RX ORDER — METHADONE HYDROCHLORIDE 10 MG/ML
100 CONCENTRATE ORAL ONCE
Status: COMPLETED | OUTPATIENT
Start: 2020-02-28 | End: 2020-02-28

## 2020-02-28 RX ADMIN — PANCRELIPASE 72000 UNITS: 24000; 76000; 120000 CAPSULE, DELAYED RELEASE PELLETS ORAL at 10:36

## 2020-02-28 RX ADMIN — DICYCLOMINE HYDROCHLORIDE 20 MG: 20 TABLET ORAL at 10:36

## 2020-02-28 RX ADMIN — SUCRALFATE 1000 MG: 1 SUSPENSION ORAL at 10:36

## 2020-02-28 RX ADMIN — PREGABALIN 100 MG: 100 CAPSULE ORAL at 10:36

## 2020-02-28 RX ADMIN — METHADONE HYDROCHLORIDE 100 MG: 10 CONCENTRATE ORAL at 11:51

## 2020-02-28 RX ADMIN — ATENOLOL 100 MG: 50 TABLET ORAL at 10:36

## 2020-02-28 NOTE — PROGRESS NOTES
Progress Note - Neurology   Shayna Ortega 37 y o  male MRN: [id number]  Unit/Bed#: -01 Encounter: [id number]    Assessment:  70-year-old male with acute on chronic pancreatitis and episode of prolonged amnesia  Amnesia:  -MRI brain and a routine EEG performed and both were unremarkable studies  -strongly suspect stress/medicational etiology of event  Low suspicion for ictal etiology  Facial asymmetry:  -again, this appears to have been present since at least 2014 as demonstrated by patient's old 's license photo   -no stroke on MRI  Acute on chronic pancreatitis:    -chronically on narcotic pain medications  Plan:  1  Treatment of pancreatitis as per primary service  2  No further inpatient neurologic recommendations  Examined and discussed with Dr Marcio Ruelas  Subjective:   Patient denies complaints except for abdominal pain after eating, which is chronic  No further episodes of altered mental status  MRI and EEG performed; both unremarkable  ROS: 12 point review of systems performed and negative except as indicated above  Vitals: Blood pressure 138/78, pulse 73, temperature (!) 97 4 °F (36 3 °C), resp  rate 18, height 5' 8" (1 727 m), weight 119 kg (262 lb 5 6 oz), SpO2 94 %  ,Body mass index is 39 89 kg/m²  Physical Exam:   Gen: NAD  HEENT: head normocephalic  Eyes anicteric  Neuro:  Patient is alert and pleasantly interactive  Not dysarthric  Fully oriented  2/3 simple object delayed recall  Left facial asymmetry  Unremarkable spontaneous gait  Full strength noted throughout the 4 extremities  Lab, Imaging and other studies: I have personally reviewed pertinent reports  and studies in PACS (MRI brain, EEG)    VTE Prophylaxis: Sequential compression device (Venodyne)

## 2020-02-28 NOTE — DISCHARGE SUMMARY
Discharge Summary - [facility] Internal Medicine    Patient Information: Britni Frias 37 y o  male MRN: [id number]  Unit/Bed#: -01 Encounter: [id number]    Discharging Physician / Practitioner: Anthony Meza MD  PCP: Cabrera Howell DO  Admission Date: 2/26/2020  Discharge Date: 02/28/20    Reason for Admission:  Transient alteration of awareness    Discharge Diagnoses:     Principal Problem:    Transient alteration of awareness  Active Problems:    Other chronic pancreatitis [facility])    Pancreatic cyst    Tobacco use    Opiate dependence, continuous ([facility] [address] )    Essential hypertension    Class 2 obesity due to excess calories in adult  Resolved Problems:    Diabetes mellitus without complication ([facility] [address] )    Present on Admission:   Other chronic pancreatitis ([facility] [address] )   Pancreatic cyst   Tobacco use   Essential hypertension   Class 2 obesity due to excess calories in adult   Opiate dependence, continuous ([facility] [address] )   (Resolved) Diabetes mellitus without complication ([facility] [address] )   Transient alteration of awareness    Consultations During Hospital Stay:  · Neurology  · GI    Procedures Performed:     · CT abdomen/pelvis  · CT brain  · MRI brain  · MRCP  · EGD    Significant Findings:     · Pancreatic pseudocyst as before and mesenteric collateral vessels in the left upper quadrant due to chronic splenic vein occlusion  · CT scan of abdomen showed acute distal pancreatitis and fluid collection in the pancreatic tail, attic steatosis  · Other tests unremarkable    Incidental Findings:   · As above     Test Results Pending at Discharge (will require follow up): · None     Outpatient Tests Requested:  · None    Complications:  None    Hospital Course:     Britni Frias is a 37 y o  male patient who originally presented to the hospital on 2/26/2020 due to transient loss of consciousness  He had further workup done as above  He has chronic pancreatitis and he really did not have any significant more abdominal pain    Again had a CT scan of his abdomen  Lipase was mildly elevated  Evaluated by GI and neurology service  Had other workup done which has been unremarkable  He is chronically on pain medications/methadone  Exact etiology not clear  Follow-up on outpatient basis for further workup if needed  Otherwise he would resume his home medications on discharge as well  Condition at Discharge: fair     Discharge Day Visit / Exam:     Subjective:  Feels fine  He is eager to be discharged home  Denies any significant abdominal pain  No further episode of change in mental status or an episode he had before he came to the hospital  Vitals: Blood Pressure: 138/78 (02/28/20 0753)  Pulse: 73 (02/28/20 0753)  Temperature: (!) 97 4 °F (36 3 °C) (02/28/20 0753)  Temp Source: Oral (02/26/20 2252)  Respirations: 18 (02/28/20 0753)  Height: 5' 8" (172 7 cm) (02/27/20 1333)  Weight - Scale: 119 kg (262 lb 5 6 oz) (02/27/20 1333)  SpO2: 94 % (02/28/20 0753)  Exam:        Vital signs are reviewed as above  Abdomen is soft  Grossly nontender  Has chronic mild upper abdominal soreness  Bowel sounds are audible  Awake and alert  Oriented x3  Decreased breath sounds at the bases  Pleasant  S1 and S2 audible          Discharge instructions/Information to patient and family:   See after visit summary for information provided to patient and family  Provisions for Follow-Up Care:  See after visit summary for information related to follow-up care and any pertinent home health orders  Disposition:     Home    For Discharges to [facility] SNF:   · Not Applicable to this Patient - Not Applicable to this Patient    Planned Readmission:  None     Discharge Statement:  I spent more than 30 minutes discharging the patient  This time was spent on the day of discharge  I had direct contact with the patient on the day of discharge   Greater than 50% of the total time was spent examining patient, answering all patient questions, arranging and discussing plan of care with patient as well as directly providing post-discharge instructions  Additional time then spent on discharge activities  Discharge Medications:  See after visit summary for reconciled discharge medications provided to patient and family  ** Please Note: Dragon 360 Dictation voice to text software may have been used in the creation of this document   **

## 2020-02-28 NOTE — SOCIAL WORK
HRR 26  CM met with the pt at bedside to discuss dcp  The pt resides with his wife in a 1 story home located in [address] with 4 [address]  The pt does not use any DME and is able to complete all of his ADL's w/o any assistance  The pt has no hx of VNA  The pt has been in [facility] with [facility] in the past   The pt uses [employer] and is able to afford his medication  The pt has no hx of MH/SA  The pt wife and mother are his POA, Kina Challenger, spouse and Trudy Bonds his mother  The pt is on SSD and drives himself to and from appt  The pt states that his friend will transport him home  The pt PCP is Shawna Fry  The pt also has an O/P CM with Chante Croft [phone]  CM reviewed discharge planning process including the following: identifying caregivers at home, preference for d/c planning needs, availability of treatment team to discuss questions or concerns patient and/or family may have regarding diagnosis, plan of care, old or new medications and discharge planning  Discharge Checklist reviewed and CM will continue to monitor for progress toward discharge goals in nursing and provider rounds  Cm called the pt PCP and scheduled an appt for Tuesday March 3rd, 2020 @ 9:25am   Cm reached out to the pt O/P Cm, Manjit Garcia @ [phone] to inform her about the pt current hospitalization  The pt declined any HHC and states that his friend will transport him home

## 2020-03-02 NOTE — UTILIZATION REVIEW
Notification of Discharge  This is a Notification of Discharge from our facility [address]  Please be advised that this patient has been discharge from our facility  Below you will find the admission and discharge date and time including the patients disposition  PRESENTATION DATE: 2/26/2020  6:17 PM  OBS ADMISSION DATE:   IP ADMISSION DATE: 2/26/20 2021   DISCHARGE DATE: 2/28/2020 12:26 PM  DISPOSITION: Home/Self Care Home/Self Care   Admission Orders listed below:  Admission Orders (From admission, onward)     Ordered        02/26/20 2021  Inpatient Admission  Once         02/26/20 2022  Inpatient Admission  Once                   Please contact the UR Department if additional information is required to close this patient's authorization/case  [facility] Utilization Review Department  Main: [phone] x carefully listen to the prompts  All voicemails are confidential   [email or web address]  Send all requests for admission clinical reviews, approved or denied determinations and any other requests to dedicated fax number below belonging to the campus where the patient is receiving treatment   List of dedicated fax numbers:  [address] DENIALS (Administrative/Medical Necessity) [phone]   [address]  (Maternity/NICU/Pediatrics) [phone]   [facility] [phone]   [facility] [phone]   Scout [facility]ae [phone]   Emma Javier [facility] [address] [facility] [phone]   [facility]  [phone]   [address] [facility]  [address] [address] [facility] [phone]

## 2023-03-13 NOTE — ASSESSMENT & PLAN NOTE
Life style modifications counseled  Patient discharged home with his sister. Discharge paperwork reviewed with patient and sister and they voice understanding. All personal belongings including home medication (Breztri inhaler) taken with patient.

## 2023-05-18 NOTE — TELEPHONE ENCOUNTER
Sedrick Stahl he has a contrast allergy - do you want him to do the prep prior or do a noncontrast CT? show

## 2025-07-17 NOTE — SOCIAL WORK
Has The Growth Been Previously Biopsied?: has been previously biopsied Per RN during rounds pt is npo for pancreatitis and having abdominal pain  GI is following and pt may have egd today- awaiting confirmation  Pt is self with no needs